# Patient Record
Sex: FEMALE | Race: WHITE | NOT HISPANIC OR LATINO | Employment: OTHER | URBAN - METROPOLITAN AREA
[De-identification: names, ages, dates, MRNs, and addresses within clinical notes are randomized per-mention and may not be internally consistent; named-entity substitution may affect disease eponyms.]

---

## 2017-01-30 ENCOUNTER — ONCOLOGY VISIT (OUTPATIENT)
Dept: ONCOLOGY | Facility: CLINIC | Age: 27
End: 2017-01-30
Attending: NURSE PRACTITIONER
Payer: COMMERCIAL

## 2017-01-30 ENCOUNTER — APPOINTMENT (OUTPATIENT)
Dept: LAB | Facility: CLINIC | Age: 27
End: 2017-01-30
Attending: NURSE PRACTITIONER
Payer: COMMERCIAL

## 2017-01-30 VITALS
BODY MASS INDEX: 21.25 KG/M2 | WEIGHT: 119.93 LBS | SYSTOLIC BLOOD PRESSURE: 118 MMHG | HEIGHT: 63 IN | RESPIRATION RATE: 18 BRPM | DIASTOLIC BLOOD PRESSURE: 74 MMHG | TEMPERATURE: 97.8 F | OXYGEN SATURATION: 100 % | HEART RATE: 83 BPM

## 2017-01-30 DIAGNOSIS — Z08 ENCOUNTER FOR FOLLOW-UP SURVEILLANCE OF OVARIAN CANCER: ICD-10-CM

## 2017-01-30 DIAGNOSIS — Z85.43 ENCOUNTER FOR FOLLOW-UP SURVEILLANCE OF OVARIAN CANCER: ICD-10-CM

## 2017-01-30 LAB — CANCER AG125 SERPL-ACNC: 13 U/ML (ref 0–30)

## 2017-01-30 PROCEDURE — 86304 IMMUNOASSAY TUMOR CA 125: CPT | Performed by: NURSE PRACTITIONER

## 2017-01-30 PROCEDURE — 36415 COLL VENOUS BLD VENIPUNCTURE: CPT

## 2017-01-30 PROCEDURE — 99212 OFFICE O/P EST SF 10 MIN: CPT

## 2017-01-30 PROCEDURE — 99213 OFFICE O/P EST LOW 20 MIN: CPT | Mod: ZP | Performed by: NURSE PRACTITIONER

## 2017-01-30 ASSESSMENT — ENCOUNTER SYMPTOMS
JOINT SWELLING: 0
SPEECH CHANGE: 0
RESPIRATORY PAIN: 0
PALPITATIONS: 0
MUSCLE CRAMPS: 0
HOARSE VOICE: 0
NAUSEA: 0
LIGHT-HEADEDNESS: 0
HEARTBURN: 0
EYE PAIN: 0
SMELL DISTURBANCE: 0
TACHYCARDIA: 0
PARALYSIS: 0
ARTHRALGIAS: 0
DECREASED APPETITE: 0
DIFFICULTY URINATING: 0
SHORTNESS OF BREATH: 0
HALLUCINATIONS: 0
CHILLS: 0
INSOMNIA: 0
BLOATING: 0
TINGLING: 0
JAUNDICE: 0
INCREASED ENERGY: 0
DIZZINESS: 0
DIARRHEA: 0
MEMORY LOSS: 0
SPUTUM PRODUCTION: 0
SWOLLEN GLANDS: 0
HEMOPTYSIS: 0
MYALGIAS: 0
HEADACHES: 0
LOSS OF CONSCIOUSNESS: 0
SNORES LOUDLY: 0
SLEEP DISTURBANCES DUE TO BREATHING: 0
COUGH DISTURBING SLEEP: 0
DISTURBANCES IN COORDINATION: 0
DYSURIA: 0
SKIN CHANGES: 0
SINUS CONGESTION: 0
HYPOTENSION: 0
COUGH: 0
CLAUDICATION: 0
DECREASED LIBIDO: 0
STIFFNESS: 0
CONSTIPATION: 0
POOR WOUND HEALING: 0
TASTE DISTURBANCE: 0
NIGHT SWEATS: 0
EYE WATERING: 0
FLANK PAIN: 0
ABDOMINAL PAIN: 0
SINUS PAIN: 0
POLYPHAGIA: 0
MUSCLE WEAKNESS: 0
LEG PAIN: 0
VOMITING: 0
TROUBLE SWALLOWING: 0
FATIGUE: 0
ORTHOPNEA: 0
FEVER: 0
EYE REDNESS: 0
EXERCISE INTOLERANCE: 0
POSTURAL DYSPNEA: 0
BACK PAIN: 0
HOT FLASHES: 0
PANIC: 0
WHEEZING: 0
NERVOUS/ANXIOUS: 0
BREAST PAIN: 0
SYNCOPE: 0
DOUBLE VISION: 0
NECK PAIN: 0
NECK MASS: 0
WEIGHT GAIN: 0
SEIZURES: 0
ALTERED TEMPERATURE REGULATION: 0
BOWEL INCONTINENCE: 0
RECTAL BLEEDING: 0
NUMBNESS: 0
DECREASED CONCENTRATION: 0
NAIL CHANGES: 0
WEAKNESS: 0
DEPRESSION: 0
DYSPNEA ON EXERTION: 0
EYE IRRITATION: 0
POLYDIPSIA: 0
WEIGHT LOSS: 0
TREMORS: 0
BRUISES/BLEEDS EASILY: 1
BLOOD IN STOOL: 0
BREAST MASS: 0
RECTAL PAIN: 0
LEG SWELLING: 0
SORE THROAT: 0
HEMATURIA: 0
HYPERTENSION: 0

## 2017-01-30 ASSESSMENT — PAIN SCALES - GENERAL: PAINLEVEL: NO PAIN (0)

## 2017-01-30 NOTE — NURSING NOTE
"Kelley Serra is a 26 year old female who presents for:  Chief Complaint   Patient presents with     Labs Only     venipuncture, vitals checked     Oncology Clinic Visit     return patient visit for 3 month ck related to ovarian cancer         Initial Vitals:  /74 mmHg  Pulse 83  Temp(Src) 97.8  F (36.6  C)  Resp 18  Ht 1.6 m (5' 3\")  Wt 54.4 kg (119 lb 14.9 oz)  BMI 21.25 kg/m2  SpO2 100% Estimated body mass index is 21.25 kg/(m^2) as calculated from the following:    Height as of this encounter: 1.6 m (5' 3\").    Weight as of this encounter: 54.4 kg (119 lb 14.9 oz).. Body surface area is 1.55 meters squared. BP completed using cuff size: NA (Not Taken)  No Pain (0) No LMP recorded. Allergies and medications reviewed.     Medications: Medication refills not needed today.  Pharmacy name entered into Drim[facility]: [facility] [place] [address] AT [facility]    Comments: patient denied pelvic/vaginal pain/discomfort.     5 minutes for nursing intake (face to face time)   Francisca Washington CMA          "

## 2017-01-30 NOTE — Clinical Note
2017       RE: Kelley Serra  [address] [facility] [address]     Dear Colleague,    Thank you for referring your patient, Kelley Serra, to the [facility]. Please see a copy of my visit note below.    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 She completed treatment on 10/2/15 with an exploratory laparotomy, RSO, and fertility sparing staging. She presents today for a 3 month surveillance visit.    HPI: Oscar comes to the clinic accompanied by her  Nik. She has been feeling well without gynecologic concern. She has been having normal periods every 28-30 days, LMP two weeks ago. Her headaches have resolved with changing to a larger computer screen and getting glasses. She notes a bruise to her left lower extremity and is unaware of how she got it along with another bruise on her left upper thigh but denies any petechiae or spontaneous mucosal bleeding. Denies unintended weight loss, fatigue, weakness, changes in vision or hearing, shortness of breath, cough, chest pain, abdominal pain, dyspepsia, nausea, vomiting, constipation, diarrhea, bloating, dysuria, urinary frequency or urgency, hematuria, pelvic pain, lower back pain, vaginal bleeding, vaginal discharge, numbness or tingling, or swelling of the extremities.       Brief Oncology History:  16: Abdominal pain, 30cm pelvic mass.  73.  10/2/15: Sent to OR for ex lap, RSO, and fertility sparing staging. 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 Elected observation at this time.  16:  13  16:  9, Pap NIL  10/31/16:  10  17:  13    Past Medical History   Diagnosis Date     Esophageal reflux      Asthma, mild intermittent      last  inhaler usage >6 years ago (4/11)     Diagnostic skin and sensitization tests 2/26/14 skin tests pos. for:  dog(+)/DM/M only and only on intradermals--ie, minimally allergic     Allergic rhinitis due to animal dander      House dust mite allergy      Allergy to mold spores      2/26/14 skin tests pos. 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     treated with RSO       Past Surgical History   Procedure Laterality Date     Laparotomy exploratory  10/2/15     Right salpingo-oophorectomy, omentectomy, appendectomy, lymph node biopsies, cancer staging      Laparotomy, staging, combined N/A 10/2/2015     Procedure: COMBINED LAPAROTOMY, STAGING;  Surgeon: Duyen Thayer MD;  Location:  OR     Esophagoscopy, gastroscopy, duodenoscopy (egd), combined N/A 11/23/2015     Procedure: COMBINED ESOPHAGOSCOPY, GASTROSCOPY, DUODENOSCOPY (EGD), BIOPSY SINGLE OR MULTIPLE;  Surgeon: Babs Cassidy MD;  Location:  GI     Colonoscopy N/A 12/2/2015     Procedure: COLONOSCOPY;  Surgeon: Aaron Garrison MD;  Location:  GI       Social History     Social History     Marital Status:      Spouse Name: N/A     Number of Children: N/A     Years of Education: N/A     Occupational History     Not on file.     Social History Main Topics     Smoking status: Never Smoker      Smokeless tobacco: Never Used     Alcohol Use: No     Drug Use: No     Sexual Activity:     Partners: Male     Birth Control/ Protection: Condom     Other Topics Concern     Parent/Sibling W/ Cabg, Mi Or Angioplasty Before 65f 55m? No     Social History Narrative       Family History   Problem Relation Age of Onset     DIABETES Mother      gestational     Thyroid Disease Mother      Blood Disease Mother      anemia     Cancer - colorectal Mother 54     CANCER Maternal Grandmother      Blood Disease Maternal Grandmother      cervical      Respiratory Maternal Grandmother      emphysema     DIABETES Maternal Grandfather      CEREBROVASCULAR DISEASE Maternal Grandfather      CANCER Maternal Grandfather      kidney     Breast Cancer Paternal Grandmother      DIABETES Paternal Grandfather      HEART DISEASE Father 56     had angioplasty       Current Outpatient Prescriptions   Medication     FLUoxetine (PROZAC) 10 MG capsule     ALPRAZolam (XANAX) 0.25 MG tablet     MULTIPLE VITAMINS PO     No current facility-administered medications for this visit.     Facility-Administered Medications Ordered in Other Visits   Medication     ketorolac (TORADOL) injection          Allergies   Allergen Reactions     Cleocin Rash     Keflex [Cephalexin Monohydrate] Rash     Zoloft        Review of Systems     Constitutional:  Negative for fever, chills, weight loss, weight gain, fatigue, decreased appetite, night sweats, recent stressors, height gain, height loss, post-operative complications, incisional pain, hallucinations, increased energy, hyperactivity and confused.   HENT:  Negative for ear pain, hearing loss, tinnitus, nosebleeds, trouble swallowing, hoarse voice, mouth sores, sore throat, ear discharge, tooth pain, gum tenderness, taste disturbance, smell disturbance, hearing aid, bleeding gums, dry mouth, sinus pain, sinus congestion and neck mass.    Eyes:  Negative for double vision, pain, redness, eye pain, decreased vision, eye watering, eye bulging, eye dryness, flashing lights, spots, floaters, strabismus, tunnel vision, jaundice and eye irritation.   Respiratory:   Negative for cough, hemoptysis, sputum production, shortness of breath, wheezing, sleep disturbances due to breathing, snores loudly, respiratory pain, dyspnea on exertion, cough disturbing sleep and postural dyspnea.    Cardiovascular:  Negative for chest pain, dyspnea on exertion, palpitations, orthopnea, claudication, leg swelling, fingers/toes turn blue, hypertension, hypotension, syncope,  history of heart murmur, chest pain on exertion, chest pain at rest, pacemaker, few scattered varicosities, leg pain, sleep disturbances due to breathing, tachycardia, light-headedness, exercise intolerance and edema.   Gastrointestinal:  Negative for heartburn, nausea, vomiting, abdominal pain, diarrhea, constipation, blood in stool, melena, rectal pain, bloating, hemorrhoids, bowel incontinence, jaundice, rectal bleeding, coffee ground emesis and change in stool.   Genitourinary:  Negative for bladder incontinence, dysuria, urgency, hematuria, flank pain, vaginal discharge, difficulty urinating, genital sores, dyspareunia, decreased libido, nocturia, voiding less frequently, arousal difficulty, abnormal vaginal bleeding, excessive menstruation, menstrual changes, hot flashes, vaginal dryness and postmenopausal bleeding.   Musculoskeletal:  Negative for myalgias, back pain, joint swelling, arthralgias, stiffness, muscle cramps, neck pain, bone pain, muscle weakness and fracture.   Skin:  Negative for nail changes, itching, poor wound healing, rash, hair changes, skin changes, acne, warts, poor wound healing, scarring, flaky skin, Raynaud's phenomenon, sensitivity to sunlight and skin thickening.   Neurological:  Negative for dizziness, tingling, tremors, speech change, seizures, loss of consciousness, weakness, light-headedness, numbness, headaches, disturbances in coordination, memory loss, difficulty walking and paralysis.   Endo/Heme:  Positive for bruises/bleeds easily. Negative for anemia and swollen glands.   Psychiatric/Behavioral:  Negative for depression, hallucinations, memory loss, decreased concentration, mood swings and panic attacks.    Breast:  Negative for breast discharge, breast mass, breast pain and nipple retraction.   Endocrine:  Negative for altered temperature regulation, polyphagia, polydipsia, unwanted hair growth and change in facial hair.              OBJECTIVE:    Physical Exam:    BP  "118/74 mmHg  Pulse 83  Temp(Src) 97.8  F (36.6  C)  Resp 18  Ht 1.6 m (5' 3\")  Wt 54.4 kg (119 lb 14.9 oz)  BMI 21.25 kg/m2  SpO2 100%    CONSTITUTIONAL: Alert non-toxic appearing female in no acute distress  HEAD: Normocephalic, atraumatic  EYES: PERRLA; no scleral icterus  ENT: Oropharynx pink without lesions  NECK: Neck supple without lymphadenopathy  RESPIRATORY: Lungs clear to auscultation, no increased work of breathing noted  CV: Regular rate and rhythm, S1S2, no clicks, murmurs, rubs, or gallops; bilateral lower extremities without edema, dorsalis pedis pulses 2+ bilaterally  GASTROINTESTINAL: Normoactive bowel sounds x4 quadrants, abdomen soft, non-distended, and non-tender to palpation without masses or organomegaly  GENITOURINARY: External genitalia and urethral meatus pink without lesions, masses, or excoriation. Vagina pink and moist without masses or lesions. Cervix without masses or lesions, os without bleeding, small amount of physiologic discharge noted. Bimanual exam reveals no masses, fullness, or cervical motion tenderness. Rectovaginal exam confirms these findings.  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 Continue surveillance every three months x2 years (through 10/2017) followed by every six months x3 years (through 10/2020) then annually thereafter with  and pelvic/rectal exam. Reviewed signs and symptoms  of recurrence " including but not limited to bleeding from vagina, bladder, or rectum, bloating, early satiety, swelling in the lower extremities, abdominal or lower back pain, changes in bowel or bladder patterns, shortness of breath, increased fatigue, unexplained weight loss, and night sweats. Reviewed signs and symptoms for when she should contact the clinic or seek additional care. Patient to contact the clinic with any questions or concerns in the interim.  2) Bruising: No spontaneous bleeding, mucosal bleeds, or petechiae. To contact PCP if this worsens, discussed s/sxs bleeding.  3) Genetic testing: Risk factors have been assessed and the patient does not meet qualifications for genetic counseling based on NCCN guidelines.   4) Labs:   5) Health maintenance issues discussed today include to follow up with PCP for co-morbid conditions and non-gynecologic issues. Pap due in 2019.  6) Patient verbalized understanding of and agreement with plan.    A total of 20 minutes of face to face time spent with patient, over 50% of which was spent in counseling and coordination of care.    CLAUDIA Chandler, FNP-C  Family Nurse Practitioner  Gynecologic Oncology  [facility]  Pager: [phone]

## 2017-01-30 NOTE — PROGRESS NOTES
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 She completed treatment on 10/2/15 with an exploratory laparotomy, RSO, and fertility sparing staging. She presents today for a 3 month surveillance visit.    HPI: Oscar comes to the clinic accompanied by her  Nik. She has been feeling well without gynecologic concern. She has been having normal periods every 28-30 days, LMP two weeks ago. Her headaches have resolved with changing to a larger computer screen and getting glasses. She notes a bruise to her left lower extremity and is unaware of how she got it along with another bruise on her left upper thigh but denies any petechiae or spontaneous mucosal bleeding. Denies unintended weight loss, fatigue, weakness, changes in vision or hearing, shortness of breath, cough, chest pain, abdominal pain, dyspepsia, nausea, vomiting, constipation, diarrhea, bloating, dysuria, urinary frequency or urgency, hematuria, pelvic pain, lower back pain, vaginal bleeding, vaginal discharge, numbness or tingling, or swelling of the extremities.       Brief Oncology History:  16: Abdominal pain, 30cm pelvic mass.  73.  10/2/15: Sent to OR for ex lap, RSO, and fertility sparing staging. 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 Elected observation at this time.  16:  13  16:  9, Pap NIL  10/31/16:  10  17:  13    Past Medical History   Diagnosis Date     Esophageal reflux      Asthma, mild intermittent      last inhaler usage >6 years ago ()     Diagnostic skin and sensitization tests 14 skin tests pos. for:  dog(+)/DM/M only and only on intradermals--ie, minimally allergic     Allergic rhinitis due to animal dander      House dust mite allergy       Allergy to mold spores      2/26/14 skin tests pos. 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     treated with RSO       Past Surgical History   Procedure Laterality Date     Laparotomy exploratory  10/2/15     Right salpingo-oophorectomy, omentectomy, appendectomy, lymph node biopsies, cancer staging      Laparotomy, staging, combined N/A 10/2/2015     Procedure: COMBINED LAPAROTOMY, STAGING;  Surgeon: Duyen Thayer MD;  Location: [facility] OR     Esophagoscopy, gastroscopy, duodenoscopy (egd), combined N/A 11/23/2015     Procedure: COMBINED ESOPHAGOSCOPY, GASTROSCOPY, DUODENOSCOPY (EGD), BIOPSY SINGLE OR MULTIPLE;  Surgeon: Babs Cassidy MD;  Location: [facility] GI     Colonoscopy N/A 12/2/2015     Procedure: COLONOSCOPY;  Surgeon: Aaron Garrison MD;  Location:  GI       Social History     Social History     Marital Status:      Spouse Name: N/A     Number of Children: N/A     Years of Education: N/A     Occupational History     Not on file.     Social History Main Topics     Smoking status: Never Smoker      Smokeless tobacco: Never Used     Alcohol Use: No     Drug Use: No     Sexual Activity:     Partners: Male     Birth Control/ Protection: Condom     Other Topics Concern     Parent/Sibling W/ Cabg, Mi Or Angioplasty Before 65f 55m? No     Social History Narrative       Family History   Problem Relation Age of Onset     DIABETES Mother      gestational     Thyroid Disease Mother      Blood Disease Mother      anemia     Cancer - colorectal Mother 54     CANCER Maternal Grandmother      Blood Disease Maternal Grandmother      cervical     Respiratory Maternal Grandmother      emphysema     DIABETES Maternal Grandfather      CEREBROVASCULAR DISEASE Maternal Grandfather      CANCER Maternal Grandfather      kidney     Breast Cancer Paternal Grandmother      DIABETES Paternal Grandfather       HEART DISEASE Father 56     had angioplasty       Current Outpatient Prescriptions   Medication     FLUoxetine (PROZAC) 10 MG capsule     ALPRAZolam (XANAX) 0.25 MG tablet     MULTIPLE VITAMINS PO     No current facility-administered medications for this visit.     Facility-Administered Medications Ordered in Other Visits   Medication     ketorolac (TORADOL) injection          Allergies   Allergen Reactions     Cleocin Rash     Keflex [Cephalexin Monohydrate] Rash     Zoloft        Review of Systems     Constitutional:  Negative for fever, chills, weight loss, weight gain, fatigue, decreased appetite, night sweats, recent stressors, height gain, height loss, post-operative complications, incisional pain, hallucinations, increased energy, hyperactivity and confused.   HENT:  Negative for ear pain, hearing loss, tinnitus, nosebleeds, trouble swallowing, hoarse voice, mouth sores, sore throat, ear discharge, tooth pain, gum tenderness, taste disturbance, smell disturbance, hearing aid, bleeding gums, dry mouth, sinus pain, sinus congestion and neck mass.    Eyes:  Negative for double vision, pain, redness, eye pain, decreased vision, eye watering, eye bulging, eye dryness, flashing lights, spots, floaters, strabismus, tunnel vision, jaundice and eye irritation.   Respiratory:   Negative for cough, hemoptysis, sputum production, shortness of breath, wheezing, sleep disturbances due to breathing, snores loudly, respiratory pain, dyspnea on exertion, cough disturbing sleep and postural dyspnea.    Cardiovascular:  Negative for chest pain, dyspnea on exertion, palpitations, orthopnea, claudication, leg swelling, fingers/toes turn blue, hypertension, hypotension, syncope, history of heart murmur, chest pain on exertion, chest pain at rest, pacemaker, few scattered varicosities, leg pain, sleep disturbances due to breathing, tachycardia, light-headedness, exercise intolerance and edema.   Gastrointestinal:  Negative for  "heartburn, nausea, vomiting, abdominal pain, diarrhea, constipation, blood in stool, melena, rectal pain, bloating, hemorrhoids, bowel incontinence, jaundice, rectal bleeding, coffee ground emesis and change in stool.   Genitourinary:  Negative for bladder incontinence, dysuria, urgency, hematuria, flank pain, vaginal discharge, difficulty urinating, genital sores, dyspareunia, decreased libido, nocturia, voiding less frequently, arousal difficulty, abnormal vaginal bleeding, excessive menstruation, menstrual changes, hot flashes, vaginal dryness and postmenopausal bleeding.   Musculoskeletal:  Negative for myalgias, back pain, joint swelling, arthralgias, stiffness, muscle cramps, neck pain, bone pain, muscle weakness and fracture.   Skin:  Negative for nail changes, itching, poor wound healing, rash, hair changes, skin changes, acne, warts, poor wound healing, scarring, flaky skin, Raynaud's phenomenon, sensitivity to sunlight and skin thickening.   Neurological:  Negative for dizziness, tingling, tremors, speech change, seizures, loss of consciousness, weakness, light-headedness, numbness, headaches, disturbances in coordination, memory loss, difficulty walking and paralysis.   Endo/Heme:  Positive for bruises/bleeds easily. Negative for anemia and swollen glands.   Psychiatric/Behavioral:  Negative for depression, hallucinations, memory loss, decreased concentration, mood swings and panic attacks.    Breast:  Negative for breast discharge, breast mass, breast pain and nipple retraction.   Endocrine:  Negative for altered temperature regulation, polyphagia, polydipsia, unwanted hair growth and change in facial hair.              OBJECTIVE:    Physical Exam:    /74 mmHg  Pulse 83  Temp(Src) 97.8  F (36.6  C)  Resp 18  Ht 1.6 m (5' 3\")  Wt 54.4 kg (119 lb 14.9 oz)  BMI 21.25 kg/m2  SpO2 100%    CONSTITUTIONAL: Alert non-toxic appearing female in no acute distress  HEAD: Normocephalic, atraumatic  EYES: " PERRLA; no scleral icterus  ENT: Oropharynx pink without lesions  NECK: Neck supple without lymphadenopathy  RESPIRATORY: Lungs clear to auscultation, no increased work of breathing noted  CV: Regular rate and rhythm, S1S2, no clicks, murmurs, rubs, or gallops; bilateral lower extremities without edema, dorsalis pedis pulses 2+ bilaterally  GASTROINTESTINAL: Normoactive bowel sounds x4 quadrants, abdomen soft, non-distended, and non-tender to palpation without masses or organomegaly  GENITOURINARY: External genitalia and urethral meatus pink without lesions, masses, or excoriation. Vagina pink and moist without masses or lesions. Cervix without masses or lesions, os without bleeding, small amount of physiologic discharge noted. Bimanual exam reveals no masses, fullness, or cervical motion tenderness. Rectovaginal exam confirms these findings.  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 Continue surveillance every three months x2 years (through 10/2017) followed by every six months x3 years (through 10/2020) then annually thereafter with  and pelvic/rectal exam. Reviewed signs and symptoms  of recurrence including but not limited to bleeding from vagina, bladder, or rectum, bloating, early satiety, swelling in the lower extremities, abdominal or lower back pain, changes in bowel or bladder patterns, shortness of breath, increased fatigue, unexplained  weight loss, and night sweats. Reviewed signs and symptoms for when she should contact the clinic or seek additional care. Patient to contact the clinic with any questions or concerns in the interim.  2) Bruising: No spontaneous bleeding, mucosal bleeds, or petechiae. To contact PCP if this worsens, discussed s/sxs bleeding.  3) Genetic testing: Risk factors have been assessed and the patient does not meet qualifications for genetic counseling based on NCCN guidelines.   4) Labs:   5) Health maintenance issues discussed today include to follow up with PCP for co-morbid conditions and non-gynecologic issues. Pap due in 2019.  6) Patient verbalized understanding of and agreement with plan.    A total of 20 minutes of face to face time spent with patient, over 50% of which was spent in counseling and coordination of care.    CLAUDIA Chandler, FNP-C  Family Nurse Practitioner  Gynecologic Oncology  [facility]  Pager: [phone]

## 2017-04-19 ASSESSMENT — ENCOUNTER SYMPTOMS
TASTE DISTURBANCE: 0
FEVER: 0
HOARSE VOICE: 0
HALLUCINATIONS: 0
SORE THROAT: 1
POLYPHAGIA: 0
SMELL DISTURBANCE: 0
POLYDIPSIA: 0
SINUS CONGESTION: 1
WEIGHT LOSS: 0
DECREASED APPETITE: 0
INCREASED ENERGY: 0
NECK MASS: 0
ALTERED TEMPERATURE REGULATION: 0
CHILLS: 0
SINUS PAIN: 0
WEIGHT GAIN: 0
FATIGUE: 1
TROUBLE SWALLOWING: 0
NIGHT SWEATS: 1

## 2017-04-23 ASSESSMENT — ENCOUNTER SYMPTOMS
DISTURBANCES IN COORDINATION: 0
RECTAL PAIN: 0
NERVOUS/ANXIOUS: 0
HYPERTENSION: 0
BOWEL INCONTINENCE: 0
DECREASED CONCENTRATION: 0
BLOATING: 0
POLYDIPSIA: 0
LOSS OF CONSCIOUSNESS: 0
JOINT SWELLING: 0
SPUTUM PRODUCTION: 0
POOR WOUND HEALING: 0
BREAST MASS: 0
ARTHRALGIAS: 0
SPEECH CHANGE: 0
WEAKNESS: 0
BACK PAIN: 0
HOARSE VOICE: 0
INCREASED ENERGY: 0
TASTE DISTURBANCE: 0
TROUBLE SWALLOWING: 0
DYSURIA: 0
DIFFICULTY URINATING: 0
NECK PAIN: 0
ABDOMINAL PAIN: 0
INSOMNIA: 0
BREAST PAIN: 0
RESPIRATORY PAIN: 0
MYALGIAS: 0
POSTURAL DYSPNEA: 0
LEG PAIN: 0
TACHYCARDIA: 0
FLANK PAIN: 0
HALLUCINATIONS: 0
PALPITATIONS: 0
BRUISES/BLEEDS EASILY: 1
DECREASED APPETITE: 0
PANIC: 0
MUSCLE CRAMPS: 0
MEMORY LOSS: 0
EYE IRRITATION: 0
DECREASED LIBIDO: 0
MUSCLE WEAKNESS: 0
SNORES LOUDLY: 0
TINGLING: 0
LEG SWELLING: 0
SEIZURES: 0
DEPRESSION: 0
RECTAL BLEEDING: 0
DIZZINESS: 0
POLYPHAGIA: 0
SYNCOPE: 0
CHILLS: 0
ALTERED TEMPERATURE REGULATION: 0
VOMITING: 0
DIARRHEA: 0
WEIGHT GAIN: 0
WHEEZING: 0
CONSTIPATION: 0
DYSPNEA ON EXERTION: 0
LIGHT-HEADEDNESS: 0
NUMBNESS: 0
TREMORS: 0
BLOOD IN STOOL: 0
CLAUDICATION: 0
FEVER: 0
SLEEP DISTURBANCES DUE TO BREATHING: 0
HEMOPTYSIS: 0
HEARTBURN: 0
SMELL DISTURBANCE: 0
EXERCISE INTOLERANCE: 0
EYE WATERING: 0
SKIN CHANGES: 0
SHORTNESS OF BREATH: 0
NAIL CHANGES: 0
HEMATURIA: 0
COUGH DISTURBING SLEEP: 0
STIFFNESS: 0
WEIGHT LOSS: 0
DOUBLE VISION: 0
EYE REDNESS: 0
COUGH: 0
SWOLLEN GLANDS: 0
HEADACHES: 0
JAUNDICE: 0
NECK MASS: 0
HOT FLASHES: 0
PARALYSIS: 0
HYPOTENSION: 0
SINUS PAIN: 0
NAUSEA: 0
EYE PAIN: 0
ORTHOPNEA: 0

## 2017-04-23 NOTE — PROGRESS NOTES
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 She completed treatment on 10/2/15 with an exploratory laparotomy, RSO, and fertility sparing staging. She presents today for a 3 month surveillance visit.    HPI: Oscar comes to the clinic  feeling well without gynecologic concern. She has been having normal periods every 28-30 days. She is sexually active without concern. She notes \"random night sweats\" and a \"canker sore\" since February. She saw her dentist who recommend salt water rinses but feels this has not improved and is now causing headaches. She continues to note bruising but denies any beau mucosal bleeding. She is due to see her PCP. Denies unintended weight loss, fatigue, weakness, changes in vision or hearing, shortness of breath, cough, chest pain, abdominal pain, dyspepsia, nausea, vomiting, constipation, diarrhea, bloating, dysuria, urinary frequency or urgency, hematuria, pelvic pain, lower back pain, abnormal vaginal bleeding, vaginal discharge, numbness or tingling, or swelling of the extremities.       Brief Oncology History:  16: Abdominal pain, 30cm pelvic mass.  73.  10/2/15: Sent to OR for ex lap, RSO, and fertility sparing staging. 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 Elected observation at this time.  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 for:  dog(+)/DM/M only and only on intradermals--ie, minimally allergic     Asthma, mild intermittent     last inhaler usage >6 years ago ()     Diagnostic skin and sensitization tests 14 skin tests " pos. 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    treated with RSO       Past Surgical History:   Procedure Laterality Date     COLONOSCOPY N/A 12/2/2015    Procedure: COLONOSCOPY;  Surgeon: Aaron Garrison MD;  Location:  GI     ESOPHAGOSCOPY, GASTROSCOPY, DUODENOSCOPY (EGD), COMBINED N/A 11/23/2015    Procedure: COMBINED ESOPHAGOSCOPY, GASTROSCOPY, DUODENOSCOPY (EGD), BIOPSY SINGLE OR MULTIPLE;  Surgeon: Babs Cassidy MD;  Location:  GI     LAPAROTOMY EXPLORATORY  10/2/15    Right salpingo-oophorectomy, omentectomy, appendectomy, lymph node biopsies, cancer staging      LAPAROTOMY, STAGING, COMBINED N/A 10/2/2015    Procedure: COMBINED LAPAROTOMY, STAGING;  Surgeon: Duyen Thayer MD;  Location:  OR       Social History     Social History     Marital status:      Spouse name: N/A     Number of children: N/A     Years of education: N/A     Occupational History     Not on file.     Social History Main Topics     Smoking status: Never Smoker     Smokeless tobacco: Never Used     Alcohol use No     Drug use: No     Sexual activity: Yes     Partners: Male     Birth control/ protection: Condom     Other Topics Concern     Parent/Sibling W/ Cabg, Mi Or Angioplasty Before 65f 55m? No     Social History Narrative       Family History   Problem Relation Age of Onset     DIABETES Mother      gestational     Thyroid Disease Mother      Blood Disease Mother      anemia     Cancer - colorectal Mother 54     CANCER Maternal Grandmother      Blood Disease Maternal Grandmother      cervical     Respiratory Maternal Grandmother      emphysema     DIABETES Maternal Grandfather      CEREBROVASCULAR DISEASE Maternal Grandfather      CANCER Maternal Grandfather      kidney     Breast Cancer Paternal Grandmother      DIABETES Paternal Grandfather       HEART DISEASE Father 56     had angioplasty       Current Outpatient Prescriptions   Medication     FLUoxetine (PROZAC) 10 MG capsule     ALPRAZolam (XANAX) 0.25 MG tablet     MULTIPLE VITAMINS PO     No current facility-administered medications for this visit.      Facility-Administered Medications Ordered in Other Visits   Medication     ketorolac (TORADOL) injection          Allergies   Allergen Reactions     Cleocin Rash     Keflex [Cephalexin Monohydrate] Rash     Zoloft        Review of Systems     Constitutional:  Positive for fatigue and night sweats. Negative for fever, chills, weight loss, weight gain, decreased appetite, recent stressors, height gain, height loss, post-operative complications, incisional pain, hallucinations, increased energy, hyperactivity and confused.   HENT:  Positive for mouth sores, sore throat, gum tenderness and sinus congestion. Negative for ear pain, hearing loss, tinnitus, nosebleeds, trouble swallowing, hoarse voice, ear discharge, tooth pain, taste disturbance, smell disturbance, hearing aid, bleeding gums, dry mouth, sinus pain and neck mass.    Eyes:  Negative for double vision, pain, redness, eye pain, decreased vision, eye watering, eye bulging, eye dryness, flashing lights, spots, floaters, strabismus, tunnel vision, jaundice and eye irritation.   Respiratory:   Negative for cough, hemoptysis, sputum production, shortness of breath, wheezing, sleep disturbances due to breathing, snores loudly, respiratory pain, dyspnea on exertion, cough disturbing sleep and postural dyspnea.    Cardiovascular:  Negative for chest pain, dyspnea on exertion, palpitations, orthopnea, claudication, leg swelling, fingers/toes turn blue, hypertension, hypotension, syncope, history of heart murmur, chest pain on exertion, chest pain at rest, pacemaker, few scattered varicosities, leg pain, sleep disturbances due to breathing, tachycardia, light-headedness, exercise intolerance and edema.  "  Gastrointestinal:  Negative for heartburn, nausea, vomiting, abdominal pain, diarrhea, constipation, blood in stool, melena, rectal pain, bloating, hemorrhoids, bowel incontinence, jaundice, rectal bleeding, coffee ground emesis and change in stool.   Genitourinary:  Negative for bladder incontinence, dysuria, urgency, hematuria, flank pain, vaginal discharge, difficulty urinating, genital sores, dyspareunia, decreased libido, nocturia, voiding less frequently, arousal difficulty, abnormal vaginal bleeding, excessive menstruation, menstrual changes, hot flashes, vaginal dryness and postmenopausal bleeding.   Musculoskeletal:  Negative for myalgias, back pain, joint swelling, arthralgias, stiffness, muscle cramps, neck pain, bone pain, muscle weakness and fracture.   Skin:  Negative for nail changes, itching, poor wound healing, rash, hair changes, skin changes, acne, warts, poor wound healing, scarring, flaky skin, Raynaud's phenomenon, sensitivity to sunlight and skin thickening.   Neurological:  Negative for dizziness, tingling, tremors, speech change, seizures, loss of consciousness, weakness, light-headedness, numbness, headaches, disturbances in coordination, memory loss, difficulty walking and paralysis.   Endo/Heme:  Positive for bruises/bleeds easily. Negative for anemia and swollen glands.   Psychiatric/Behavioral:  Negative for depression, hallucinations, memory loss, decreased concentration, mood swings and panic attacks.    Breast:  Negative for breast discharge, breast mass, breast pain and nipple retraction.   Endocrine:  Negative for altered temperature regulation, polyphagia, polydipsia, unwanted hair growth and change in facial hair.        OBJECTIVE:    Physical Exam:    /62  Pulse 81  Temp 98.3  F (36.8  C)  Resp 17  Ht 1.6 m (5' 3\")  Wt 54 kg (119 lb)  SpO2 98%  BMI 21.08 kg/m2    CONSTITUTIONAL: Alert non-toxic appearing female in no acute distress  HEAD: Normocephalic, " atraumatic  EYES: PERRLA; no scleral icterus  ENT: Oropharynx pink without lesions other than small raised lesion to R upper gumline  NECK: Neck supple without lymphadenopathy  RESPIRATORY: Lungs clear to auscultation, no increased work of breathing noted  CV: Regular rate and rhythm, S1S2, no clicks, murmurs, rubs, or gallops; bilateral lower extremities without edema, dorsalis pedis pulses 2+ bilaterally  GASTROINTESTINAL: Normoactive bowel sounds x4 quadrants, abdomen soft, non-distended, and non-tender to palpation without masses or organomegaly  GENITOURINARY: External genitalia and urethral meatus pink without lesions, masses, or excoriation. Vagina pink and moist without masses or lesions. Cervix without masses or lesions, os without bleeding, small amount of physiologic discharge noted. Bimanual exam reveals no masses, fullness, or cervical motion tenderness. Rectovaginal exam confirms these findings.  LYMPHATIC: Cervical, supraclavicular, and inguinal nodes without lymphadenopathy  MUSCULOSKELETAL: Moves all extremities, no obvious muscle wasting  NEUROLOGIC: No gross deficits, normal gait  SKIN: Appropriate color for race, warm and dry, no rashes or lesions to unclothed skin  PSYCHIATRIC: Pleasant and interactive, affect bright, makes appropriate eye contact, thought process linear    Data:   10    Assessment/Plan:  1) History of borderline mucinous tumor of the right ovary: No signs of recurrence. Continue surveillance every three months x2 years (through 10/2017) followed by every six months x3 years (through 10/2020) then annually thereafter with  and pelvic/rectal exam. Reviewed signs and symptoms  of recurrence including but not limited to bleeding from vagina, bladder, or rectum, bloating, early satiety, swelling in the lower extremities, abdominal or lower back pain, changes in bowel or bladder patterns, shortness of breath, increased fatigue, unexplained weight loss, and night sweats.  Reviewed signs and symptoms for when she should contact the clinic or seek additional care. Patient to contact the clinic with any questions or concerns in the interim.  2) Bruising: No spontaneous bleeding, mucosal bleeds, or petechiae. To contact PCP if this worsens, discussed s/sxs bleeding.  3) Oral lesion concerns: To discuss this with PCP as it has not improved in 2+ months and is now associated with headaches  4) Genetic testing: Risk factors have been assessed and the patient does not meet qualifications for genetic counseling based on NCCN guidelines.   5) Labs:   6) Health maintenance issues discussed today include to follow up with PCP for co-morbid conditions and non-gynecologic issues. Pap due in 2019.  7) Patient verbalized understanding of and agreement with plan.    A total of 20 minutes of face to face time spent with patient, over 50% of which was spent in counseling and coordination of care.    CLAUDIA Chandler, FNP-C  Family Nurse Practitioner  Gynecologic Oncology  [facility]  Pager: [phone]

## 2017-04-24 ENCOUNTER — ONCOLOGY VISIT (OUTPATIENT)
Dept: ONCOLOGY | Facility: CLINIC | Age: 27
End: 2017-04-24
Attending: NURSE PRACTITIONER
Payer: COMMERCIAL

## 2017-04-24 ENCOUNTER — APPOINTMENT (OUTPATIENT)
Dept: LAB | Facility: CLINIC | Age: 27
End: 2017-04-24
Attending: OBSTETRICS & GYNECOLOGY
Payer: COMMERCIAL

## 2017-04-24 VITALS
WEIGHT: 119 LBS | HEART RATE: 81 BPM | RESPIRATION RATE: 17 BRPM | SYSTOLIC BLOOD PRESSURE: 103 MMHG | OXYGEN SATURATION: 98 % | BODY MASS INDEX: 21.09 KG/M2 | HEIGHT: 63 IN | TEMPERATURE: 98.3 F | DIASTOLIC BLOOD PRESSURE: 62 MMHG

## 2017-04-24 DIAGNOSIS — Z08 ENCOUNTER FOR FOLLOW-UP SURVEILLANCE OF OVARIAN CANCER: ICD-10-CM

## 2017-04-24 DIAGNOSIS — Z85.43 ENCOUNTER FOR FOLLOW-UP SURVEILLANCE OF OVARIAN CANCER: ICD-10-CM

## 2017-04-24 LAB — CANCER AG125 SERPL-ACNC: 10 U/ML (ref 0–30)

## 2017-04-24 PROCEDURE — 86304 IMMUNOASSAY TUMOR CA 125: CPT | Performed by: NURSE PRACTITIONER

## 2017-04-24 PROCEDURE — 36415 COLL VENOUS BLD VENIPUNCTURE: CPT

## 2017-04-24 PROCEDURE — 99212 OFFICE O/P EST SF 10 MIN: CPT | Mod: ZF

## 2017-04-24 PROCEDURE — 99213 OFFICE O/P EST LOW 20 MIN: CPT | Mod: ZP | Performed by: NURSE PRACTITIONER

## 2017-04-24 ASSESSMENT — ENCOUNTER SYMPTOMS
SINUS CONGESTION: 1
NIGHT SWEATS: 1
SORE THROAT: 1
FATIGUE: 1

## 2017-04-24 ASSESSMENT — PAIN SCALES - GENERAL: PAINLEVEL: NO PAIN (0)

## 2017-04-24 NOTE — MR AVS SNAPSHOT
After Visit Summary   4/24/2017    Kelley Serra    MRN: [id number]           Patient Information     Date Of Birth          1990        Visit Information        Provider Department      4/24/2017 10:20 AM Myra Gifford APRN CNP [facility]        Today's Diagnoses     Encounter for follow-up surveillance of ovarian cancer           Follow-ups after your visit        Follow-up notes from your care team     Return in about 3 months (around 7/24/2017), or if symptoms worsen or fail to improve, for surveillance with NP.      Your next 10 appointments already scheduled     Aug 01, 2017 11:30 AM CDT   [facility] with  [facility]   [facility] Draw ([facility])    [address] [place] [id number] [phone]            Aug 01, 2017 12:00 PM CDT   (Arrive by 11:45 AM)   Return Visit with CLAUDIA Kaufman CNP   [facility] ([facility])    [address] [place] [id number] [phone]              Who to contact     If you have questions or need follow up information about today's clinic visit or your schedule please contact [facility] directly at [phone].  Normal or non-critical lab and imaging results will be communicated to you by MyChart, letter or phone within 4 business days after the clinic has received the results. If you do not hear from us within 7 days, please contact the clinic through MyChart or phone. If you have a critical or abnormal lab result, we will notify you by phone as soon as possible.  Submit refill requests through [employer] or call your pharmacy and they will forward the refill request to us. Please allow 3 business days for your refill to be completed.          Additional Information About Your Visit        [employer] Information     [employer] gives you secure access to your  "electronic health record. If you see a primary care provider, you can also send messages to your care team and make appointments. If you have questions, please call your primary care clinic.  If you do not have a primary care provider, please call [phone] and they will assist you.        Care EveryWhere ID     This is your Care EveryWhere ID. This could be used by other organizations to access your [facility] medical records  [id number]        Your Vitals Were     Pulse Temperature Respirations Height Pulse Oximetry BMI (Body Mass Index)    81 98.3  F (36.8  C) 17 1.6 m (5' 3\") 98% 21.08 kg/m2       Blood Pressure from Last 3 Encounters:   04/24/17 103/62   01/30/17 118/74   10/31/16 106/64    Weight from Last 3 Encounters:   04/24/17 54 kg (119 lb)   01/30/17 54.4 kg (119 lb 14.9 oz)   10/31/16 57.2 kg (126 lb)              We Performed the Following             Primary Care Provider Office Phone # Fax #    CLAUDIA [facility] [phone]       [place] [facility] [address] [place] [facility] [address]        Thank you!     Thank you for choosing [facility]  for your care. Our goal is always to provide you with excellent care. Hearing back from our patients is one way we can continue to improve our services. Please take a few minutes to complete the written survey that you may receive in the mail after your visit with us. Thank you!             Your Updated Medication List - Protect others around you: Learn how to safely use, store and throw away your medicines at www.disposemymeds.org.          This list is accurate as of: 4/24/17 11:25 AM.  Always use your most recent med list.                   Brand Name Dispense Instructions for use    ALPRAZolam 0.25 MG tablet    XANAX    40 tablet    Take 1 tablet (0.25 mg) by mouth 2 times daily as needed for anxiety       FLUoxetine 10 MG capsule    PROzac    30 capsule    Take 1 capsule (10 mg) by mouth daily    "    MULTIPLE VITAMINS PO      take  by mouth.

## 2017-04-24 NOTE — NURSING NOTE
Chief Complaint   Patient presents with     Blood Draw     Patient is here today for labs to be drawn right AC area. Vitals charted,labs collected and checked into next appt.

## 2017-04-24 NOTE — LETTER
"2017       RE: Kelley Serra  [address] [facility] [address]     Dear Colleague,    Thank you for referring your patient, Kelley Serra, to the [facility]. Please see a copy of my visit note below.    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 She completed treatment on 10/2/15 with an exploratory laparotomy, RSO, and fertility sparing staging. She presents today for a 3 month surveillance visit.    HPI: Oscar comes to the clinic  feeling well without gynecologic concern. She has been having normal periods every 28-30 days. She is sexually active without concern. She notes \"random night sweats\" and a \"canker sore\" since February. She saw her dentist who recommend salt water rinses but feels this has not improved and is now causing headaches. She continues to note bruising but denies any beau mucosal bleeding. She is due to see her PCP. Denies unintended weight loss, fatigue, weakness, changes in vision or hearing, shortness of breath, cough, chest pain, abdominal pain, dyspepsia, nausea, vomiting, constipation, diarrhea, bloating, dysuria, urinary frequency or urgency, hematuria, pelvic pain, lower back pain, abnormal vaginal bleeding, vaginal discharge, numbness or tingling, or swelling of the extremities.       Brief Oncology History:  16: Abdominal pain, 30cm pelvic mass.  73.  10/2/15: Sent to OR for ex lap, RSO, and fertility sparing staging. 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 Elected observation at this time.  16:  13  16:  9, Pap NIL  10/31/16:  10  17:  13  17:  10    Past Medical History:   Diagnosis Date     Allergic rhinitis due to animal dander      Allergy to " mold spores     2/26/14 skin tests pos. for:  dog(+)/DM/M only and only on intradermals--ie, minimally allergic     Asthma, mild intermittent     last inhaler usage >6 years ago (4/11)     Diagnostic skin and sensitization tests 2/26/14 skin tests pos. 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    treated with RSO       Past Surgical History:   Procedure Laterality Date     COLONOSCOPY N/A 12/2/2015    Procedure: COLONOSCOPY;  Surgeon: Aaron Garrison MD;  Location:  GI     ESOPHAGOSCOPY, GASTROSCOPY, DUODENOSCOPY (EGD), COMBINED N/A 11/23/2015    Procedure: COMBINED ESOPHAGOSCOPY, GASTROSCOPY, DUODENOSCOPY (EGD), BIOPSY SINGLE OR MULTIPLE;  Surgeon: Babs Cassidy MD;  Location:  GI     LAPAROTOMY EXPLORATORY  10/2/15    Right salpingo-oophorectomy, omentectomy, appendectomy, lymph node biopsies, cancer staging      LAPAROTOMY, STAGING, COMBINED N/A 10/2/2015    Procedure: COMBINED LAPAROTOMY, STAGING;  Surgeon: Duyen Thayer MD;  Location:  OR       Social History     Social History     Marital status:      Spouse name: N/A     Number of children: N/A     Years of education: N/A     Occupational History     Not on file.     Social History Main Topics     Smoking status: Never Smoker     Smokeless tobacco: Never Used     Alcohol use No     Drug use: No     Sexual activity: Yes     Partners: Male     Birth control/ protection: Condom     Other Topics Concern     Parent/Sibling W/ Cabg, Mi Or Angioplasty Before 65f 55m? No     Social History Narrative       Family History   Problem Relation Age of Onset     DIABETES Mother      gestational     Thyroid Disease Mother      Blood Disease Mother      anemia     Cancer - colorectal Mother 54     CANCER Maternal Grandmother      Blood Disease Maternal Grandmother      cervical      Respiratory Maternal Grandmother      emphysema     DIABETES Maternal Grandfather      CEREBROVASCULAR DISEASE Maternal Grandfather      CANCER Maternal Grandfather      kidney     Breast Cancer Paternal Grandmother      DIABETES Paternal Grandfather      HEART DISEASE Father 56     had angioplasty       Current Outpatient Prescriptions   Medication     FLUoxetine (PROZAC) 10 MG capsule     ALPRAZolam (XANAX) 0.25 MG tablet     MULTIPLE VITAMINS PO     No current facility-administered medications for this visit.      Facility-Administered Medications Ordered in Other Visits   Medication     ketorolac (TORADOL) injection          Allergies   Allergen Reactions     Cleocin Rash     Keflex [Cephalexin Monohydrate] Rash     Zoloft        Review of Systems     Constitutional:  Positive for fatigue and night sweats. Negative for fever, chills, weight loss, weight gain, decreased appetite, recent stressors, height gain, height loss, post-operative complications, incisional pain, hallucinations, increased energy, hyperactivity and confused.   HENT:  Positive for mouth sores, sore throat, gum tenderness and sinus congestion. Negative for ear pain, hearing loss, tinnitus, nosebleeds, trouble swallowing, hoarse voice, ear discharge, tooth pain, taste disturbance, smell disturbance, hearing aid, bleeding gums, dry mouth, sinus pain and neck mass.    Eyes:  Negative for double vision, pain, redness, eye pain, decreased vision, eye watering, eye bulging, eye dryness, flashing lights, spots, floaters, strabismus, tunnel vision, jaundice and eye irritation.   Respiratory:   Negative for cough, hemoptysis, sputum production, shortness of breath, wheezing, sleep disturbances due to breathing, snores loudly, respiratory pain, dyspnea on exertion, cough disturbing sleep and postural dyspnea.    Cardiovascular:  Negative for chest pain, dyspnea on exertion, palpitations, orthopnea, claudication, leg swelling, fingers/toes turn blue,  hypertension, hypotension, syncope, history of heart murmur, chest pain on exertion, chest pain at rest, pacemaker, few scattered varicosities, leg pain, sleep disturbances due to breathing, tachycardia, light-headedness, exercise intolerance and edema.   Gastrointestinal:  Negative for heartburn, nausea, vomiting, abdominal pain, diarrhea, constipation, blood in stool, melena, rectal pain, bloating, hemorrhoids, bowel incontinence, jaundice, rectal bleeding, coffee ground emesis and change in stool.   Genitourinary:  Negative for bladder incontinence, dysuria, urgency, hematuria, flank pain, vaginal discharge, difficulty urinating, genital sores, dyspareunia, decreased libido, nocturia, voiding less frequently, arousal difficulty, abnormal vaginal bleeding, excessive menstruation, menstrual changes, hot flashes, vaginal dryness and postmenopausal bleeding.   Musculoskeletal:  Negative for myalgias, back pain, joint swelling, arthralgias, stiffness, muscle cramps, neck pain, bone pain, muscle weakness and fracture.   Skin:  Negative for nail changes, itching, poor wound healing, rash, hair changes, skin changes, acne, warts, poor wound healing, scarring, flaky skin, Raynaud's phenomenon, sensitivity to sunlight and skin thickening.   Neurological:  Negative for dizziness, tingling, tremors, speech change, seizures, loss of consciousness, weakness, light-headedness, numbness, headaches, disturbances in coordination, memory loss, difficulty walking and paralysis.   Endo/Heme:  Positive for bruises/bleeds easily. Negative for anemia and swollen glands.   Psychiatric/Behavioral:  Negative for depression, hallucinations, memory loss, decreased concentration, mood swings and panic attacks.    Breast:  Negative for breast discharge, breast mass, breast pain and nipple retraction.   Endocrine:  Negative for altered temperature regulation, polyphagia, polydipsia, unwanted hair growth and change in facial  "hair.        OBJECTIVE:    Physical Exam:    /62  Pulse 81  Temp 98.3  F (36.8  C)  Resp 17  Ht 1.6 m (5' 3\")  Wt 54 kg (119 lb)  SpO2 98%  BMI 21.08 kg/m2    CONSTITUTIONAL: Alert non-toxic appearing female in no acute distress  HEAD: Normocephalic, atraumatic  EYES: PERRLA; no scleral icterus  ENT: Oropharynx pink without lesions other than small raised lesion to R upper gumline  NECK: Neck supple without lymphadenopathy  RESPIRATORY: Lungs clear to auscultation, no increased work of breathing noted  CV: Regular rate and rhythm, S1S2, no clicks, murmurs, rubs, or gallops; bilateral lower extremities without edema, dorsalis pedis pulses 2+ bilaterally  GASTROINTESTINAL: Normoactive bowel sounds x4 quadrants, abdomen soft, non-distended, and non-tender to palpation without masses or organomegaly  GENITOURINARY: External genitalia and urethral meatus pink without lesions, masses, or excoriation. Vagina pink and moist without masses or lesions. Cervix without masses or lesions, os without bleeding, small amount of physiologic discharge noted. Bimanual exam reveals no masses, fullness, or cervical motion tenderness. Rectovaginal exam confirms these findings.  LYMPHATIC: Cervical, supraclavicular, and inguinal nodes without lymphadenopathy  MUSCULOSKELETAL: Moves all extremities, no obvious muscle wasting  NEUROLOGIC: No gross deficits, normal gait  SKIN: Appropriate color for race, warm and dry, no rashes or lesions to unclothed skin  PSYCHIATRIC: Pleasant and interactive, affect bright, makes appropriate eye contact, thought process linear    Data:   10    Assessment/Plan:  1) History of borderline mucinous tumor of the right ovary: No signs of recurrence. Continue surveillance every three months x2 years (through 10/2017) followed by every six months x3 years (through 10/2020) then annually thereafter with  and pelvic/rectal exam. Reviewed signs and symptoms  of recurrence including but not " limited to bleeding from vagina, bladder, or rectum, bloating, early satiety, swelling in the lower extremities, abdominal or lower back pain, changes in bowel or bladder patterns, shortness of breath, increased fatigue, unexplained weight loss, and night sweats. Reviewed signs and symptoms for when she should contact the clinic or seek additional care. Patient to contact the clinic with any questions or concerns in the interim.  2) Bruising: No spontaneous bleeding, mucosal bleeds, or petechiae. To contact PCP if this worsens, discussed s/sxs bleeding.  3) Oral lesion concerns: To discuss this with PCP as it has not improved in 2+ months and is now associated with headaches  4) Genetic testing: Risk factors have been assessed and the patient does not meet qualifications for genetic counseling based on NCCN guidelines.   5) Labs:   6) Health maintenance issues discussed today include to follow up with PCP for co-morbid conditions and non-gynecologic issues. Pap due in 2019.  7) Patient verbalized understanding of and agreement with plan.    A total of 20 minutes of face to face time spent with patient, over 50% of which was spent in counseling and coordination of care.    CLAUDIA Chandler, FNP-C  Family Nurse Practitioner  Gynecologic Oncology  [facility]  Pager: [phone]

## 2017-04-24 NOTE — NURSING NOTE
"Kelley Serra is a 26 year old female who presents for:  Chief Complaint   Patient presents with     Blood Draw     Patient is here today for labs to be drawn right AC area. Vitals charted,labs collected and checked into next appt.     Oncology Clinic Visit     return patient visit for 3 month follow up related to  Mucinous cystadenoma, borderline malignancy        Initial Vitals:  /62  Pulse 81  Temp 98.3  F (36.8  C)  Resp 17  Ht 1.6 m (5' 3\")  Wt 54 kg (119 lb)  SpO2 98%  BMI 21.08 kg/m2 Estimated body mass index is 21.08 kg/(m^2) as calculated from the following:    Height as of this encounter: 1.6 m (5' 3\").    Weight as of this encounter: 54 kg (119 lb).. Body surface area is 1.55 meters squared. BP completed using cuff size: NA (Not Taken)  No Pain (0) No LMP recorded. Allergies and medications reviewed.     Medications: Medication refills not needed today.  Pharmacy name entered into [employer]: [facility] [id number] [place] [address] [address] AT [facility]    Comments: patient mentioned night sweats and head pain.     5 minutes for nursing intake (face to face time)   Francisca Washington CMA        "

## 2017-04-27 ENCOUNTER — OFFICE VISIT (OUTPATIENT)
Dept: FAMILY MEDICINE | Facility: CLINIC | Age: 27
End: 2017-04-27
Payer: COMMERCIAL

## 2017-04-27 VITALS
SYSTOLIC BLOOD PRESSURE: 112 MMHG | HEIGHT: 63 IN | TEMPERATURE: 98.6 F | DIASTOLIC BLOOD PRESSURE: 70 MMHG | WEIGHT: 116.4 LBS | BODY MASS INDEX: 20.62 KG/M2 | HEART RATE: 87 BPM

## 2017-04-27 DIAGNOSIS — K13.79 MOUTH SORE: ICD-10-CM

## 2017-04-27 DIAGNOSIS — R23.2 HOT FLASHES: ICD-10-CM

## 2017-04-27 DIAGNOSIS — T14.8XXA BRUISING: Primary | ICD-10-CM

## 2017-04-27 LAB
APTT PPP: 28 SEC (ref 22–37)
ERYTHROCYTE [DISTWIDTH] IN BLOOD BY AUTOMATED COUNT: 13.2 % (ref 10–15)
FSH SERPL-ACNC: 2.8 IU/L
HCT VFR BLD AUTO: 43.2 % (ref 35–47)
HGB BLD-MCNC: 13.9 G/DL (ref 11.7–15.7)
LH SERPL-ACNC: 3.3 IU/L
MCH RBC QN AUTO: 29 PG (ref 26.5–33)
MCHC RBC AUTO-ENTMCNC: 32.2 G/DL (ref 31.5–36.5)
MCV RBC AUTO: 90 FL (ref 78–100)
PLATELET # BLD AUTO: 205 10E9/L (ref 150–450)
RBC # BLD AUTO: 4.79 10E12/L (ref 3.8–5.2)
WBC # BLD AUTO: 6.4 10E9/L (ref 4–11)

## 2017-04-27 PROCEDURE — 36415 COLL VENOUS BLD VENIPUNCTURE: CPT | Performed by: NURSE PRACTITIONER

## 2017-04-27 PROCEDURE — 85027 COMPLETE CBC AUTOMATED: CPT | Performed by: NURSE PRACTITIONER

## 2017-04-27 PROCEDURE — 85730 THROMBOPLASTIN TIME PARTIAL: CPT | Performed by: NURSE PRACTITIONER

## 2017-04-27 PROCEDURE — 83001 ASSAY OF GONADOTROPIN (FSH): CPT | Performed by: NURSE PRACTITIONER

## 2017-04-27 PROCEDURE — 99215 OFFICE O/P EST HI 40 MIN: CPT | Performed by: NURSE PRACTITIONER

## 2017-04-27 PROCEDURE — 83002 ASSAY OF GONADOTROPIN (LH): CPT | Performed by: NURSE PRACTITIONER

## 2017-04-27 NOTE — MR AVS SNAPSHOT
After Visit Summary   4/27/2017    Kelley Serra    MRN: [id number]           Patient Information     Date Of Birth          1990        Visit Information        Provider Department      4/27/2017 3:20 PM Tamica Durán APRN [facility]        Today's Diagnoses     Bruising    -  1    Mouth sore        Hot flashes          Care Instructions    For the oral sore see an oral surgeon    gargle with  Diluted hydrogen peroxide   1-2 capfuls  In the glass,  Of mouthwash.   ( 1/4 full of mouth wash )     For the ears.   White vinegar mixed with rubbing alcohol use a Q-tip put in the solution , let it sit and then dry with the other tip of the Q-tip    For the bruising  - lab work done     Headaches I  anticipate will resolve as the oral sores resolve   It seems like it is hitting a nerve.     Hot flashes .   Checking hormonal levels                Follow-ups after your visit        Additional Services     ORAL SURGERY REFERRAL       Your provider has referred you to: [facility]  [phone] and [facility]  [phone] and [facility]  [phone] and [facility]  [phone]      Please be aware that coverage of these services is subject to the terms and limitations of your health insurance plan.  Call member services at your health plan with any benefit or coverage questions.      Please bring the following to your appointment:    >>   Any x-rays, CTs or MRIs which have been performed.  Contact the facility where they were done to arrange for  prior to your scheduled appointment.    >>   List of current medications   >>   This referral request   >>   Any documents/labs given to you for this referral                  Your next 10 appointments already scheduled     Aug 01, 2017 11:30 AM [facility]   [employer] Lab Draw with  [facility] LAB DRAW   [facility]  "[facility] Lab Draw ([facility])    [address]  [address] [facility] [id number] [phone]            Aug 01, 2017 12:00 PM CDT   (Arrive by 11:45 AM)   Return Visit with CLAUDIA Kaufman [facility] ([facility])    [address] [id number] [phone]              Who to contact     Normal or non-critical lab and imaging results will be communicated to you by [employer], letter or phone within 4 business days after the clinic has received the results. If you do not hear from us within 7 days, please contact the clinic through [employer] or phone. If you have a critical or abnormal lab result, we will notify you by phone as soon as possible.  Submit refill requests through [employer] or call your pharmacy and they will forward the refill request to us. Please allow 3 business days for your refill to be completed.          If you need to speak with a  for additional information , please call: [phone]           Additional Information About Your Visit        [employer] Information     [employer] gives you secure access to your electronic health record. If you see a primary care provider, you can also send messages to your care team and make appointments. If you have questions, please call your primary care clinic.  If you do not have a primary care provider, please call [phone] and they will assist you.        Care EveryWhere ID     This is your Care EveryWhere ID. This could be used by other organizations to access your [place] medical records  [id number]        Your Vitals Were     Pulse Temperature Height Last Period BMI (Body Mass Index)       87 98.6  F (37  C) (Tympanic) 5' 3\" (1.6 m) 04/09/2017 (Approximate) 20.62 kg/m2        Blood Pressure from Last 3 Encounters:   04/27/17 112/70   04/24/17 103/62   01/30/17 118/74    Weight from Last 3 Encounters:   04/27/17 " 116 lb 6.4 oz (52.8 kg)   04/24/17 119 lb (54 kg)   01/30/17 119 lb 14.9 oz (54.4 kg)              We Performed the Following     CBC with platelets     Follicle stimulating hormone     Lutropin     ORAL SURGERY REFERRAL     Partial thromboplastin time        Primary Care Provider Office Phone # Fax #    Tamica ArletCLAUDIA Mejia [facility] [phone]       [facility] [address]   [facility] [address]        Thank you!     Thank you for choosing [facility]  for your care. Our goal is always to provide you with excellent care. Hearing back from our patients is one way we can continue to improve our services. Please take a few minutes to complete the written survey that you may receive in the mail after your visit with us. Thank you!             Your Updated Medication List - Protect others around you: Learn how to safely use, store and throw away your medicines at www.disposemymeds.org.          This list is accurate as of: 4/27/17  4:34 PM.  Always use your most recent med list.                   Brand Name Dispense Instructions for use    ALPRAZolam 0.25 MG tablet    XANAX    40 tablet    Take 1 tablet (0.25 mg) by mouth 2 times daily as needed for anxiety       FLUoxetine 10 MG capsule    PROzac    30 capsule    Take 1 capsule (10 mg) by mouth daily       MULTIPLE VITAMINS PO      take  by mouth.

## 2017-04-27 NOTE — PROGRESS NOTES
SUBJECTIVE:                                                    Kelley Serra is a 26 year old female who presents to clinic today for the following health issues:    Chief Complaint   Patient presents with     Lesion     Patient has a bump on in her mouth above her right upper molar.  She first noticed it in January and was seen by a dentist in February and diagnosed as a cold soar.  Patient states that sympmtoms are not improving and  the bump is causing her to have headaches.  She has also noticed that her ears have felt itchy inside and has had some random unusual bruising on both legs and arms.       Problem list and histories reviewed & adjusted, as indicated.  Additional history: as documented    Patient Active Problem List   Diagnosis     Esophageal reflux     Benign neoplasm of skin     CARDIOVASCULAR SCREENING; LDL GOAL LESS THAN 160     Lactose intolerance     Allergy to mold spores     House dust mite allergy     Allergic rhinitis due to animal dander     Diagnostic skin and sensitization tests     Lump of other site of lower extremity     Family history of colon cancer     Postoperative state     Mucinous cystadenoma, borderline malignancy     Vitamin D deficiency     JOHN PAUL (generalized anxiety disorder)     Past Surgical History:   Procedure Laterality Date     COLONOSCOPY N/A 12/2/2015    Procedure: COLONOSCOPY;  Surgeon: Aaron Garrison MD;  Location:  GI     ESOPHAGOSCOPY, GASTROSCOPY, DUODENOSCOPY (EGD), COMBINED N/A 11/23/2015    Procedure: COMBINED ESOPHAGOSCOPY, GASTROSCOPY, DUODENOSCOPY (EGD), BIOPSY SINGLE OR MULTIPLE;  Surgeon: Babs Cassidy MD;  Location:  GI     LAPAROTOMY EXPLORATORY  10/2/15    Right salpingo-oophorectomy, omentectomy, appendectomy, lymph node biopsies, cancer staging      LAPAROTOMY, STAGING, COMBINED N/A 10/2/2015    Procedure: COMBINED LAPAROTOMY, STAGING;  Surgeon: Duyen Thayer MD;  Location:  OR       Social History    Substance Use Topics     Smoking status: Never Smoker     Smokeless tobacco: Never Used     Alcohol use No     Family History   Problem Relation Age of Onset     DIABETES Mother      gestational     Thyroid Disease Mother      Blood Disease Mother      anemia     Cancer - colorectal Mother 54     CANCER Maternal Grandmother      Blood Disease Maternal Grandmother      cervical     Respiratory Maternal Grandmother      emphysema     DIABETES Maternal Grandfather      CEREBROVASCULAR DISEASE Maternal Grandfather      CANCER Maternal Grandfather      kidney     Breast Cancer Paternal Grandmother      DIABETES Paternal Grandfather      HEART DISEASE Father 56     had angioplasty         Current Outpatient Prescriptions   Medication Sig Dispense Refill     FLUoxetine (PROZAC) 10 MG capsule Take 1 capsule (10 mg) by mouth daily 30 capsule 5     ALPRAZolam (XANAX) 0.25 MG tablet Take 1 tablet (0.25 mg) by mouth 2 times daily as needed for anxiety 40 tablet 0     MULTIPLE VITAMINS PO take  by mouth.       Allergies   Allergen Reactions     Cleocin Rash     Keflex [Cephalexin Monohydrate] Rash     Zoloft      BP Readings from Last 3 Encounters:   04/27/17 112/70   04/24/17 103/62   01/30/17 118/74    Wt Readings from Last 3 Encounters:   04/27/17 116 lb 6.4 oz (52.8 kg)   04/24/17 119 lb (54 kg)   01/30/17 119 lb 14.9 oz (54.4 kg)                    Reviewed and updated as needed this visit by clinical staff  Tobacco  Allergies  Meds       Reviewed and updated as needed this visit by Provider         ROS:  C: NEGATIVE for fever, chills, change in weight  ENT/MOUTH: POSITIVE for in January was told that she had a cold sore that would just go away and was using salt water rinses,  The cold sore has not resolved.   The sore has not resolved, when she looks at the sore it has not gotten larger,   When her tooth touches it will be an irritation pain ,  Like a tender pain   No strange taste in mouth   R: NEGATIVE for  "significant cough or SOB  CV: NEGATIVE for chest pain, palpitations or peripheral edema   POSITIVE : for follow up on ovarian cancer  Was dx 2  1/2 years ago   Started to have hot flashes off and on for about 6 months,  ( has one ovary )   Will have night hot flashes will wake drenched in sweat a couple of nights a week,   Oncology didn't think it was related to the hx of ovarian cancer  Periods are normal, does have 1 ovary   NEURO: POSITIVE for headaches daily since Dec. /Jan.  Frontal or on the sides of the head .  The headache pain seems to start at the  Sight of the oral sore.  The sore will start to hurt and then she will start to get a headache.   The headache is usually achy.  No vision changes , no nausea.  It will at times respond to  Excedrin. Some times the headache will respond and if not it will usually resolve in  A couple of hours but will then come back after a couple of hours.   Has tried making sure that she is well hydrated but that hasn't changed her headaches   Doesn't ever wake her up . But she will wake occasionally with a headache   SKIN  POSITIVE for bruising. This will come out of no where  It will just appear, no injury , the bruises will just appear,  No pain. Will stay around for a couple of weeks and will then just fad away.        OBJECTIVE:                                                    /70  Pulse 87  Temp 98.6  F (37  C) (Tympanic)  Ht 5' 3\" (1.6 m)  Wt 116 lb 6.4 oz (52.8 kg)  LMP 04/09/2017 (Approximate)  BMI 20.62 kg/m2  Body mass index is 20.62 kg/(m^2).  GENERAL: healthy, alert and no distress  HENT: right ear: and left  ear canal are pale and mildly boggy , nose and mouth does have a  nodule on the  the inside of the right cheek at the site of the  First upper molar,  And then there is a area that looks vascular .  Minimally tender.  Then on the inner check by the  Last molar on the right there are 3 white dots.  NECK: no adenopathy, no asymmetry, masses, or " scars and thyroid normal to palpation  RESP: lungs clear to auscultation - no rales, rhonchi or wheezes  CV: regular rate and rhythm, normal S1 S2, no S3 or S4, no murmur, click or rub, no peripheral edema and peripheral pulses strong  SKIN: one resolving bruise to the left  Upper chin . Not painful    ENDO hot flashes ,discussed   LYMPH: no cervical, supraclavicular,  adenopathy    Diagnostic Test Results:  Results for orders placed or performed in visit on 04/27/17   CBC with platelets   Result Value Ref Range    WBC 6.4 4.0 - 11.0 10e9/L    RBC Count 4.79 3.8 - 5.2 10e12/L    Hemoglobin 13.9 11.7 - 15.7 g/dL    Hematocrit 43.2 35.0 - 47.0 %    MCV 90 78 - 100 fl    MCH 29.0 26.5 - 33.0 pg    MCHC 32.2 31.5 - 36.5 g/dL    RDW 13.2 10.0 - 15.0 %    Platelet Count 205 150 - 450 10e9/L   Follicle stimulating hormone   Result Value Ref Range    FSH 2.8 IU/L   Lutropin   Result Value Ref Range    Lutropin 3.3 IU/L   Partial thromboplastin time   Result Value Ref Range    PTT 28 22 - 37 sec        ASSESSMENT/PLAN:                                                      ASSESSMENT/PLAN:      ICD-10-CM    1. Bruising T14.8 CBC with platelets     Partial thromboplastin time   2. Mouth sore K13.79 ORAL SURGERY REFERRAL   3. Hot flashes R23.2 Follicle stimulating hormone     Lutropin       Patient Instructions   For the oral sore see an oral surgeon    gargle with  Diluted hydrogen peroxide   1-2 capfuls  In the glass,  Of mouthwash.   ( 1/4 full of mouth wash )     For the ears.   White vinegar mixed with rubbing alcohol use a Q-tip put in the solution , let it sit and then dry with the other tip of the Q-tip    For the bruising  - lab work done     Headaches I  anticipate will resolve as the oral sores resolve   It seems like it is hitting a nerve.     Hot flashes .   Checking hormonal levels        CONSULTATION/REFERRAL to oral surgeon   See Patient Instructions    Spent 40 min with pt 32 min reviewed symptoms, concerns ,   Medication, referral     MACIE BARRERA NP, APRN CNP  [facility]

## 2017-04-27 NOTE — NURSING NOTE
"Chief Complaint   Patient presents with     Lesion     Patient has a bump on in her mouth above her right upper molar.  She first noticed it in January and was seen by a dentist in February and diagnosed as a cold soar.  Patient states that sympmtoms are not improving and  the bump is causing her to have  headaches.       Initial /70  Pulse 87  Temp 98.6  F (37  C) (Tympanic)  Ht 5' 3\" (1.6 m)  Wt 116 lb 6.4 oz (52.8 kg)  LMP 04/09/2017 (Approximate)  BMI 20.62 kg/m2 Estimated body mass index is 20.62 kg/(m^2) as calculated from the following:    Height as of this encounter: 5' 3\" (1.6 m).    Weight as of this encounter: 116 lb 6.4 oz (52.8 kg).  Medication Reconciliation: complete     Linda Smith CMA       "

## 2017-04-27 NOTE — PATIENT INSTRUCTIONS
For the oral sore see an oral surgeon    gargle with  Diluted hydrogen peroxide   1-2 capfuls  In the glass,  Of mouthwash.   ( 1/4 full of mouth wash )     For the ears.   White vinegar mixed with rubbing alcohol use a Q-tip put in the solution , let it sit and then dry with the other tip of the Q-tip    For the bruising  - lab work done     Headaches I  anticipate will resolve as the oral sores resolve   It seems like it is hitting a nerve.     Hot flashes .   Checking hormonal levels

## 2017-04-30 ENCOUNTER — MYC MEDICAL ADVICE (OUTPATIENT)
Dept: FAMILY MEDICINE | Facility: CLINIC | Age: 27
End: 2017-04-30

## 2017-04-30 DIAGNOSIS — K13.70 ORAL LESION: Primary | ICD-10-CM

## 2017-05-01 ENCOUNTER — MYC MEDICAL ADVICE (OUTPATIENT)
Dept: FAMILY MEDICINE | Facility: CLINIC | Age: 27
End: 2017-05-01

## 2017-06-28 DIAGNOSIS — F41.1 GAD (GENERALIZED ANXIETY DISORDER): ICD-10-CM

## 2017-06-28 RX ORDER — FLUOXETINE 10 MG/1
CAPSULE ORAL
Qty: 30 CAPSULE | Refills: 0 | Status: SHIPPED | OUTPATIENT
Start: 2017-06-28 | End: 2017-07-27

## 2017-06-28 NOTE — TELEPHONE ENCOUNTER
JOHN PAUL-7 SCORE 1/14/2016   Total Score 7   JOHN PAUL sent through my chart   Prescription approved per [facility] Refill Protocol or patient Primary care provider (PCP)  SIMEON Hayward  RN/[facility] Tai

## 2017-06-28 NOTE — TELEPHONE ENCOUNTER
FLUoxetine (PROZAC) 10 MG capsule     Last Written Prescription Date: 12/28/16  Last Fill Quantity: 30, # refills: 5   Last Office Visit with G primary care provider:  4/27/27        Last PHQ-9 score on record=   PHQ-9 SCORE 4/14/2011   Total Score 0

## 2017-07-18 ASSESSMENT — ENCOUNTER SYMPTOMS
MUSCLE CRAMPS: 0
MYALGIAS: 0
FATIGUE: 0
POLYDIPSIA: 0
POLYPHAGIA: 0
WEIGHT LOSS: 0
ARTHRALGIAS: 0
HALLUCINATIONS: 0
INCREASED ENERGY: 0
STIFFNESS: 0
BACK PAIN: 0
NIGHT SWEATS: 1
MUSCLE WEAKNESS: 0
FEVER: 0
ALTERED TEMPERATURE REGULATION: 0
DECREASED APPETITE: 0
WEIGHT GAIN: 0
NECK PAIN: 1
JOINT SWELLING: 0
CHILLS: 0

## 2017-07-27 DIAGNOSIS — F41.1 GAD (GENERALIZED ANXIETY DISORDER): ICD-10-CM

## 2017-07-27 RX ORDER — FLUOXETINE 10 MG/1
CAPSULE ORAL
Qty: 30 CAPSULE | Refills: 5 | Status: SHIPPED | OUTPATIENT
Start: 2017-07-27 | End: 2018-01-23

## 2017-07-27 NOTE — TELEPHONE ENCOUNTER
Fluoxetine 10mg     Last Written Prescription Date: 06/28/2017 #30 x 0  Last filled 06/28/2017  [place] [facility] [place]:  04/27/2017 MARY Durán        Last PHQ-9 score on record=   PHQ-9 SCORE 4/14/2011   Total Score 0

## 2017-08-01 ENCOUNTER — ONCOLOGY VISIT (OUTPATIENT)
Dept: ONCOLOGY | Facility: CLINIC | Age: 27
End: 2017-08-01
Attending: NURSE PRACTITIONER
Payer: COMMERCIAL

## 2017-08-01 ENCOUNTER — APPOINTMENT (OUTPATIENT)
Dept: LAB | Facility: CLINIC | Age: 27
End: 2017-08-01
Attending: NURSE PRACTITIONER
Payer: COMMERCIAL

## 2017-08-01 VITALS
RESPIRATION RATE: 16 BRPM | WEIGHT: 116.4 LBS | SYSTOLIC BLOOD PRESSURE: 102 MMHG | DIASTOLIC BLOOD PRESSURE: 61 MMHG | OXYGEN SATURATION: 99 % | HEART RATE: 80 BPM | HEIGHT: 63 IN | BODY MASS INDEX: 20.62 KG/M2 | TEMPERATURE: 99 F

## 2017-08-01 DIAGNOSIS — C56.9 MUCINOUS CYSTADENOMA, BORDERLINE MALIGNANCY (H): Primary | ICD-10-CM

## 2017-08-01 DIAGNOSIS — Z08 ENCOUNTER FOR FOLLOW-UP SURVEILLANCE OF OVARIAN CANCER: ICD-10-CM

## 2017-08-01 DIAGNOSIS — Z85.43 ENCOUNTER FOR FOLLOW-UP SURVEILLANCE OF OVARIAN CANCER: ICD-10-CM

## 2017-08-01 LAB — CANCER AG125 SERPL-ACNC: 10 U/ML (ref 0–30)

## 2017-08-01 PROCEDURE — 86304 IMMUNOASSAY TUMOR CA 125: CPT | Performed by: NURSE PRACTITIONER

## 2017-08-01 PROCEDURE — 99212 OFFICE O/P EST SF 10 MIN: CPT | Mod: ZF

## 2017-08-01 PROCEDURE — 36415 COLL VENOUS BLD VENIPUNCTURE: CPT

## 2017-08-01 PROCEDURE — 99213 OFFICE O/P EST LOW 20 MIN: CPT | Mod: ZP | Performed by: NURSE PRACTITIONER

## 2017-08-01 ASSESSMENT — PAIN SCALES - GENERAL: PAINLEVEL: NO PAIN (0)

## 2017-08-01 ASSESSMENT — ENCOUNTER SYMPTOMS
HYPOTENSION: 0
NAUSEA: 0
ALTERED TEMPERATURE REGULATION: 0
HOT FLASHES: 0
VOMITING: 0
NECK MASS: 0
NUMBNESS: 0
EXTREMITY NUMBNESS: 0
DECREASED CONCENTRATION: 0
DIFFICULTY URINATING: 0
BREAST PAIN: 0
LEG PAIN: 0
PARALYSIS: 0
ARTHRALGIAS: 0
PALPITATIONS: 0
WHEEZING: 0
MYALGIAS: 0
BREAST MASS: 0
COUGH: 0
INSOMNIA: 0
FEVER: 0
DECREASED APPETITE: 0
SNORES LOUDLY: 0
RESPIRATORY PAIN: 0
JOINT SWELLING: 0
TASTE DISTURBANCE: 0
LOSS OF CONSCIOUSNESS: 0
WEAKNESS: 0
POLYDIPSIA: 0
HEARTBURN: 0
POSTURAL DYSPNEA: 0
HEMATURIA: 0
EYE IRRITATION: 0
BOWEL INCONTINENCE: 0
HALLUCINATIONS: 0
DEPRESSION: 0
EYE PAIN: 0
MUSCLE CRAMPS: 0
NIGHT SWEATS: 1
COUGH DISTURBING SLEEP: 0
MEMORY LOSS: 0
SORE THROAT: 0
RECTAL BLEEDING: 0
SPUTUM PRODUCTION: 0
TACHYCARDIA: 0
DECREASED LIBIDO: 0
INCREASED ENERGY: 0
BRUISES/BLEEDS EASILY: 0
NAIL CHANGES: 0
POLYPHAGIA: 0
ORTHOPNEA: 0
STIFFNESS: 0
DYSPNEA ON EXERTION: 0
HYPERTENSION: 0
LEG SWELLING: 0
FATIGUE: 0
HEMOPTYSIS: 0
ABDOMINAL PAIN: 0
CLAUDICATION: 0
SHORTNESS OF BREATH: 0
EYE REDNESS: 0
TROUBLE SWALLOWING: 0
DYSURIA: 0
HEADACHES: 0
PANIC: 0
SEIZURES: 0
SLEEP DISTURBANCES DUE TO BREATHING: 0
BACK PAIN: 0
JAUNDICE: 0
SMELL DISTURBANCE: 0
CONSTIPATION: 0
CHILLS: 0
RECTAL PAIN: 0
NERVOUS/ANXIOUS: 0
DIARRHEA: 0
SINUS PAIN: 0
WEIGHT LOSS: 0
FLANK PAIN: 0
LIGHT-HEADEDNESS: 0
WEIGHT GAIN: 0
SPEECH CHANGE: 0
POOR WOUND HEALING: 0
DOUBLE VISION: 0
MUSCLE WEAKNESS: 0
BLOATING: 0
SINUS CONGESTION: 0
DISTURBANCES IN COORDINATION: 0
EXERCISE INTOLERANCE: 0
NECK PAIN: 1
SKIN CHANGES: 0
BLOOD IN STOOL: 0
HOARSE VOICE: 0
SYNCOPE: 0
EYE WATERING: 0
TINGLING: 0
SWOLLEN GLANDS: 0
TREMORS: 0
DIZZINESS: 0

## 2017-08-01 NOTE — MR AVS SNAPSHOT
After Visit Summary   8/1/2017    Kelley Serra    MRN: [id number]           Patient Information     Date Of Birth          1990        Visit Information        Provider Department      8/1/2017 12:00 PM Jolly Swanson APRN CNP [facility]        Today's Diagnoses     Mucinous cystadenoma, borderline malignancy    -  1    Encounter for follow-up surveillance of ovarian cancer           Follow-ups after your visit        Follow-up notes from your care team     Return in about 3 months (around 11/1/2017).      Your next 10 appointments already scheduled     Oct 27, 2017 10:30 AM CDT   [facility] Lab Draw with  [facility] DRAW   [facility] Lab Draw ([facility])    [address] [place] [id number] [phone]            Oct 27, 2017 11:00 AM CDT   (Arrive by 10:45 AM)   Return Visit with CLAUDIA Chandler CNP   [facility] ([facility])    [address] [place] [id number] [phone]              Who to contact     If you have questions or need follow up information about today's clinic visit or your schedule please contact [facility] directly at [phone].  Normal or non-critical lab and imaging results will be communicated to you by MyChart, letter or phone within 4 business days after the clinic has received the results. If you do not hear from us within 7 days, please contact the clinic through MyChart or phone. If you have a critical or abnormal lab result, we will notify you by phone as soon as possible.  Submit refill requests through [employer] or call your pharmacy and they will forward the refill request to us. Please allow 3 business days for your refill to be completed.          Additional Information About Your Visit        [email or web address] Information     [employer] App gives you secure access to your electronic health  "record. If you see a primary care provider, you can also send messages to your care team and make appointments. If you have questions, please call your primary care clinic.  If you do not have a primary care provider, please call [phone] and they will assist you.        Care EveryWhere ID     This is your Care EveryWhere ID. This could be used by other organizations to access your [place] medical records  [id number]        Your Vitals Were     Pulse Temperature Respirations Height Pulse Oximetry BMI (Body Mass Index)    80 99  F (37.2  C) (Oral) 16 1.6 m (5' 3\") 99% 20.62 kg/m2       Blood Pressure from Last 3 Encounters:   08/01/17 102/61   04/27/17 112/70   04/24/17 103/62    Weight from Last 3 Encounters:   08/01/17 52.8 kg (116 lb 6.4 oz)   04/27/17 52.8 kg (116 lb 6.4 oz)   04/24/17 54 kg (119 lb)              We Performed the Following             Primary Care Provider Office Phone # Fax #    Tamica Durán, APRN [phone]       [facility] [place] [facility] [address] [place] [address]        Equal Access to Services     JAY TORRES : Hadii aad ku hadasho Soomaali, waaxda luqadaha, qaybta kaalmada adeegyada, waxay idiin hayaan adeeg orlando laharish . So [facility] [phone].    ATENCIÓN: Si habla español, tiene a mccauley disposición servicios gratuitos de asistencia lingüística. Llame al [phone].    We comply with applicable federal civil rights laws and [place] laws. We do not discriminate on the basis of race, color, national origin, age, disability sex, sexual orientation or gender identity.            Thank you!     Thank you for choosing [facility]  for your care. Our goal is always to provide you with excellent care. Hearing back from our patients is one way we can continue to improve our services. Please take a few minutes to complete the written survey that you may receive in the mail after your visit with us. Thank you!             Your " Updated Medication List - Protect others around you: Learn how to safely use, store and throw away your medicines at www.disposemymeds.org.          This list is accurate as of: 8/1/17 12:38 PM.  Always use your most recent med list.                   Brand Name Dispense Instructions for use Diagnosis    ALPRAZolam 0.25 MG tablet    XANAX    40 tablet    Take 1 tablet (0.25 mg) by mouth 2 times daily as needed for anxiety    JOHN PAUL (generalized anxiety disorder)       FLUoxetine 10 MG capsule    PROzac    30 capsule    TAKE 1 CAPSULE(10 MG) BY MOUTH DAILY    JOHN PAUL (generalized anxiety disorder)       MULTIPLE VITAMINS PO      take  by mouth.

## 2017-08-01 NOTE — PROGRESS NOTES
Follow Up Notes on Referred Patient    Date: 2017       Dr. Joe Stratton MD   [facility]  [address] [place] [address]       RE: Kelley Serra  : 1990  JAYASHREE: 2017    Dear Dr. 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 She completed treatment on 10/2/15 with an exploratory laparotomy, RSO, and fertility sparing staging.  She is here today for a surveillance visit.     Brief Oncology History:  16: Abdominal pain, 30cm pelvic mass.  73.  10/2/15: Sent to OR for ex lap, RSO, and fertility sparing staging. 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 Elected observation at this time.  16:  13  16:  9, Pap NIL  10/31/16:  10  17:  13  17:  10  17:  pending.         Today she comes to clinic feeling generally well. She denies any vaginal bleeding, no changes in her bowel or bladder habits, no nausea/emesis, no lower extremity edema, and no difficulties eating or sleeping. She denies any abdominal discomfort/bloating, no fevers or chills, and no chest pain or shortness of breath. She does continue to have some night sweats but no hot flashes. She did have night sweats prior to her surgery for a couple of years. Her menses started today. She is current with seeing her PCP for her exam and has been seen for her night sweats. She is sexually active and using condoms; she is leery of OCPs and desires to use condoms for now. She is not intending any pregnancies. She developed keloid scarring along her incision right after surgery. She is looking forward to her 2 year baljit.         Review of Systems     Constitutional:  Positive for night sweats. Negative for fever, chills, weight loss, weight gain, fatigue, decreased appetite, recent stressors, height loss, post-operative  complications, incisional pain, hallucinations, increased energy, hyperactivity and confused.   HENT:  Negative for ear pain, hearing loss, tinnitus, nosebleeds, trouble swallowing, hoarse voice, mouth sores, sore throat, ear discharge, tooth pain, gum tenderness, taste disturbance, smell disturbance, hearing aid, bleeding gums, dry mouth, sinus pain, sinus congestion and neck mass.    Eyes:  Negative for double vision, pain, redness, eye pain, decreased vision, eye watering, eye bulging, eye dryness, flashing lights, spots, floaters, strabismus, tunnel vision, jaundice and eye irritation.   Respiratory:   Negative for cough, hemoptysis, sputum production, shortness of breath, wheezing, sleep disturbances due to breathing, snores loudly, respiratory pain, dyspnea on exertion, cough disturbing sleep and postural dyspnea.    Cardiovascular:  Negative for chest pain, dyspnea on exertion, palpitations, orthopnea, claudication, leg swelling, fingers/toes turn blue, hypertension, hypotension, syncope, history of heart murmur, chest pain on exertion, chest pain at rest, pacemaker, few scattered varicosities, leg pain, sleep disturbances due to breathing, tachycardia, light-headedness, exercise intolerance and edema.   Gastrointestinal:  Negative for heartburn, nausea, vomiting, abdominal pain, diarrhea, constipation, blood in stool, melena, rectal pain, bloating, hemorrhoids, bowel incontinence, jaundice, rectal bleeding, coffee ground emesis and change in stool.   Genitourinary:  Negative for bladder incontinence, dysuria, urgency, hematuria, flank pain, vaginal discharge, difficulty urinating, genital sores, dyspareunia, decreased libido, nocturia, voiding less frequently, arousal difficulty, abnormal vaginal bleeding, excessive menstruation, menstrual changes, hot flashes, vaginal dryness and postmenopausal bleeding.   Musculoskeletal:  Positive for neck pain. Negative for myalgias, back pain, joint swelling,  arthralgias, stiffness, muscle cramps, bone pain, muscle weakness and fracture.   Skin:  Negative for nail changes, itching, poor wound healing, rash, hair changes, skin changes, acne, warts, poor wound healing, scarring, flaky skin, Raynaud's phenomenon, sensitivity to sunlight and skin thickening.   Neurological:  Negative for dizziness, tingling, tremors, speech change, seizures, loss of consciousness, weakness, light-headedness, numbness, headaches, disturbances in coordination, extremity numbness, memory loss, difficulty walking and paralysis.   Endo/Heme:  Negative for anemia, swollen glands and bruises/bleeds easily.   Psychiatric/Behavioral:  Negative for depression, hallucinations, memory loss, decreased concentration, mood swings and panic attacks.    Breast:  Negative for breast discharge, breast mass, breast pain and nipple retraction.   Endocrine:  Negative for altered temperature regulation, polyphagia, polydipsia, unwanted hair growth and change in facial hair.          Past Medical History:    Past Medical History:   Diagnosis Date     Allergic rhinitis due to animal dander      Allergy to mold spores     2/26/14 skin tests pos. for:  dog(+)/DM/M only and only on intradermals--ie, minimally allergic     Asthma, mild intermittent     last inhaler usage >6 years ago (4/11)     Diagnostic skin and sensitization tests 2/26/14 skin tests pos. 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    treated with RSO         Past Surgical History:    Past Surgical History:   Procedure Laterality Date     COLONOSCOPY N/A 12/2/2015    Procedure: COLONOSCOPY;  Surgeon: Aaron Garrison MD;  Location:  GI     ESOPHAGOSCOPY, GASTROSCOPY, DUODENOSCOPY (EGD), COMBINED N/A 11/23/2015    Procedure: COMBINED ESOPHAGOSCOPY, GASTROSCOPY, DUODENOSCOPY (EGD), BIOPSY SINGLE OR  "MULTIPLE;  Surgeon: Babs Cassidy MD;  Location: [facility]     LAPAROTOMY EXPLORATORY  10/2/15    Right salpingo-oophorectomy, omentectomy, appendectomy, lymph node biopsies, cancer staging      LAPAROTOMY, STAGING, COMBINED N/A 10/2/2015    Procedure: COMBINED LAPAROTOMY, STAGING;  Surgeon: Duyen Thayer MD;  Location:  OR         There are no preventive care reminders to display for this patient.    Current Medications:     Current Outpatient Prescriptions   Medication Sig Dispense Refill     FLUoxetine (PROZAC) 10 MG capsule TAKE 1 CAPSULE(10 MG) BY MOUTH DAILY 30 capsule 5     ALPRAZolam (XANAX) 0.25 MG tablet Take 1 tablet (0.25 mg) by mouth 2 times daily as needed for anxiety 40 tablet 0     MULTIPLE VITAMINS PO take  by mouth.           Allergies:        Allergies   Allergen Reactions     Cleocin Rash     Keflex [Cephalexin Monohydrate] Rash     Zoloft         Social History:     Social History   Substance Use Topics     Smoking status: Never Smoker     Smokeless tobacco: Never Used     Alcohol use No       History   Drug Use No         Family History:       Family History   Problem Relation Age of Onset     DIABETES Mother      gestational     Thyroid Disease Mother      Blood Disease Mother      anemia     Cancer - colorectal Mother 54     CANCER Maternal Grandmother      Blood Disease Maternal Grandmother      cervical     Respiratory Maternal Grandmother      emphysema     DIABETES Maternal Grandfather      CEREBROVASCULAR DISEASE Maternal Grandfather      CANCER Maternal Grandfather      kidney     Breast Cancer Paternal Grandmother      DIABETES Paternal Grandfather      HEART DISEASE Father 56     had angioplasty         Physical Exam:     /61  Pulse 80  Temp 99  F (37.2  C) (Oral)  Resp 16  Ht 1.6 m (5' 3\")  Wt 52.8 kg (116 lb 6.4 oz)  SpO2 99%  BMI 20.62 kg/m2  Body mass index is 20.62 kg/(m^2).    General Appearance: healthy and alert, no distress     HEENT: no " thyromegaly, no palpable nodules or masses        Cardiovascular: regular rate and rhythm, no gallops, rubs or murmurs     Respiratory: lungs clear, no rales, rhonchi or wheezes, normal diaphragmatic excursion    Musculoskeletal: extremities non tender and without edema    Skin: no lesions or rashes; prominent keloid midline incision     Neurological: normal gait, no gross defects     Psychiatric: appropriate mood and affect                               Hematological: normal cervical, supraclavicular and inguinal lymph nodes     Gastrointestinal:       abdomen soft, non-tender, non-distended, no organomegaly or masses    Genitourinary: External genitalia and urethral meatus appears normal.  Vagina is smooth without nodularity or masses.  Cervix appears normal and without lesions; menses.  Bimanual exam reveal no masses, nodularity or fullness. No CMT.      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 She completed treatment on 10/2/15 with an exploratory laparotomy, RSO, and fertility sparing staging.  She is here today for a surveillance visit.    20 minutes were spent with this patient, over 50% of that time was spent in symptom management, treatment planning and in counseling and coordination of care.    Plan:     1.)        Patient to RTC in 3 months for her next surveillance visit and ; today's is pending. At that visit she will be 2 years post surgery and can extend her surveillance to every 6 months x 3 additional years. Reviewed signs and symptoms for when she should contact the clinic or seek additional care. Patient to contact the clinic with any questions or concerns in the interim.     2.) Genetic risk factors were assessed and the patient does not meet the qualifications for a referral.      3.) Labs and/or tests ordered include:  .      4.) Health maintenance issues addressed today include annual health  maintenance and non-gynecologic issues with PCP. Encouraged to follow up with PCP regarding night sweats and to check her thyroid.     CLAUDIA Bender, WHNP-BC, ANP-BC  Women's Health Nurse Practitioner  Adult Nurse Pracitioner  Gynecologic Oncology          CC  Patient Care Team:  Tamica Durán APRN CNP as PCP - General (Family Practice)  Myra Gifford APRN CNP as Nurse Practitioner (Gyn-Onc)  Miguel A Stratton MD as MD (Oncology)  MIGUEL A STRATTON

## 2017-08-01 NOTE — NURSING NOTE
"Oncology Rooming Note    August 1, 2017 11:57 AM   Kelley Serra is a 27 year old female who presents for:    Chief Complaint   Patient presents with     Labs Only     Labs drawn peripherally      Oncology Clinic Visit     return patient visit for 3 mo ck related to ovarian cancer      Initial Vitals: /61  Pulse 80  Temp 99  F (37.2  C) (Oral)  Resp 16  Ht 1.6 m (5' 3\")  Wt 52.8 kg (116 lb 6.4 oz)  SpO2 99%  BMI 20.62 kg/m2 Estimated body mass index is 20.62 kg/(m^2) as calculated from the following:    Height as of this encounter: 1.6 m (5' 3\").    Weight as of this encounter: 52.8 kg (116 lb 6.4 oz). Body surface area is 1.53 meters squared.  No Pain (0) Comment: Data Unavailable   No LMP recorded.  Allergies reviewed: Yes  Medications reviewed: Yes    Medications: Medication refills not needed today.  Pharmacy name entered into [employer]: [facility] [place] [address] AT [facility]    Clinical concerns: no concerns teodoro  was notified.    5 minutes for nursing intake (face to face time)     Francisca Washington CMA              "

## 2017-10-24 ASSESSMENT — ENCOUNTER SYMPTOMS
HEMOPTYSIS: 0
JAUNDICE: 0
POSTURAL DYSPNEA: 0
CHILLS: 0
VOMITING: 0
DIARRHEA: 1
ALTERED TEMPERATURE REGULATION: 0
SPUTUM PRODUCTION: 0
BLOATING: 0
SHORTNESS OF BREATH: 0
RESPIRATORY PAIN: 0
INCREASED ENERGY: 0
WHEEZING: 0
RECTAL BLEEDING: 0
COUGH DISTURBING SLEEP: 1
WEIGHT LOSS: 0
FATIGUE: 1
POLYPHAGIA: 0
WEIGHT GAIN: 0
DECREASED APPETITE: 0
HEARTBURN: 0
BLOOD IN STOOL: 0
HALLUCINATIONS: 0
BOWEL INCONTINENCE: 0
RECTAL PAIN: 0
COUGH: 1
FEVER: 0
POLYDIPSIA: 0
SNORES LOUDLY: 0
NAUSEA: 0
DYSPNEA ON EXERTION: 0
NIGHT SWEATS: 1
ABDOMINAL PAIN: 0
CONSTIPATION: 0

## 2017-10-27 ENCOUNTER — ONCOLOGY VISIT (OUTPATIENT)
Dept: ONCOLOGY | Facility: CLINIC | Age: 27
End: 2017-10-27
Attending: NURSE PRACTITIONER
Payer: COMMERCIAL

## 2017-10-27 ENCOUNTER — APPOINTMENT (OUTPATIENT)
Dept: LAB | Facility: CLINIC | Age: 27
End: 2017-10-27
Attending: NURSE PRACTITIONER
Payer: COMMERCIAL

## 2017-10-27 VITALS
BODY MASS INDEX: 20.64 KG/M2 | RESPIRATION RATE: 16 BRPM | SYSTOLIC BLOOD PRESSURE: 111 MMHG | OXYGEN SATURATION: 99 % | DIASTOLIC BLOOD PRESSURE: 77 MMHG | HEART RATE: 95 BPM | WEIGHT: 116.5 LBS | HEIGHT: 63 IN | TEMPERATURE: 98.1 F

## 2017-10-27 DIAGNOSIS — Z08 ENCOUNTER FOR FOLLOW-UP SURVEILLANCE OF OVARIAN CANCER: Primary | ICD-10-CM

## 2017-10-27 DIAGNOSIS — R10.31 RLQ ABDOMINAL PAIN: ICD-10-CM

## 2017-10-27 DIAGNOSIS — Z85.43 ENCOUNTER FOR FOLLOW-UP SURVEILLANCE OF OVARIAN CANCER: Primary | ICD-10-CM

## 2017-10-27 LAB — CANCER AG125 SERPL-ACNC: 10 U/ML (ref 0–30)

## 2017-10-27 PROCEDURE — 99212 OFFICE O/P EST SF 10 MIN: CPT | Mod: ZF

## 2017-10-27 PROCEDURE — 36415 COLL VENOUS BLD VENIPUNCTURE: CPT

## 2017-10-27 PROCEDURE — 99213 OFFICE O/P EST LOW 20 MIN: CPT | Mod: ZP | Performed by: NURSE PRACTITIONER

## 2017-10-27 PROCEDURE — 86304 IMMUNOASSAY TUMOR CA 125: CPT | Performed by: NURSE PRACTITIONER

## 2017-10-27 ASSESSMENT — ENCOUNTER SYMPTOMS
SHORTNESS OF BREATH: 0
LIGHT-HEADEDNESS: 0
LOSS OF CONSCIOUSNESS: 0
HALLUCINATIONS: 0
HOT FLASHES: 0
DYSURIA: 0
JAUNDICE: 0
WEIGHT LOSS: 0
COUGH DISTURBING SLEEP: 1
FATIGUE: 1
BREAST MASS: 0
SINUS PAIN: 0
SPEECH CHANGE: 0
TACHYCARDIA: 0
CLAUDICATION: 0
DIZZINESS: 0
SYNCOPE: 0
POLYDIPSIA: 0
INCREASED ENERGY: 0
EYE REDNESS: 0
MYALGIAS: 0
PALPITATIONS: 0
BLOATING: 0
TROUBLE SWALLOWING: 0
ARTHRALGIAS: 0
EYE WATERING: 0
NECK MASS: 0
BREAST PAIN: 0
SPUTUM PRODUCTION: 0
NIGHT SWEATS: 1
RECTAL BLEEDING: 0
COUGH: 1
DECREASED APPETITE: 0
HEARTBURN: 0
BACK PAIN: 0
SNORES LOUDLY: 0
WHEEZING: 0
INSOMNIA: 0
TREMORS: 0
HYPOTENSION: 0
PARALYSIS: 0
DECREASED CONCENTRATION: 0
EXERCISE INTOLERANCE: 0
LEG SWELLING: 0
NAUSEA: 0
MEMORY LOSS: 0
MUSCLE WEAKNESS: 0
HEMOPTYSIS: 0
CONSTIPATION: 0
DYSPNEA ON EXERTION: 0
SKIN CHANGES: 0
HEMATURIA: 0
ALTERED TEMPERATURE REGULATION: 0
SLEEP DISTURBANCES DUE TO BREATHING: 0
SMELL DISTURBANCE: 0
ABDOMINAL PAIN: 0
NERVOUS/ANXIOUS: 0
JOINT SWELLING: 0
HYPERTENSION: 0
DEPRESSION: 0
FLANK PAIN: 0
WEAKNESS: 0
NAIL CHANGES: 0
FEVER: 0
TASTE DISTURBANCE: 0
RESPIRATORY PAIN: 0
BLOOD IN STOOL: 0
DISTURBANCES IN COORDINATION: 0
SWOLLEN GLANDS: 0
NECK PAIN: 0
SEIZURES: 0
LEG PAIN: 0
PANIC: 0
POLYPHAGIA: 0
CHILLS: 0
NUMBNESS: 0
HEADACHES: 0
RECTAL PAIN: 0
POOR WOUND HEALING: 0
VOMITING: 0
DIFFICULTY URINATING: 0
EYE PAIN: 0
DOUBLE VISION: 0
POSTURAL DYSPNEA: 0
HOARSE VOICE: 0
EYE IRRITATION: 0
BOWEL INCONTINENCE: 0
SINUS CONGESTION: 0
MUSCLE CRAMPS: 0
ORTHOPNEA: 0
STIFFNESS: 0
DIARRHEA: 1
WEIGHT GAIN: 0
TINGLING: 0
SORE THROAT: 0
BRUISES/BLEEDS EASILY: 0
DECREASED LIBIDO: 0

## 2017-10-27 ASSESSMENT — PAIN SCALES - GENERAL: PAINLEVEL: NO PAIN (0)

## 2017-10-27 NOTE — LETTER
"10/27/2017     RE: Kelley Serra  [address] [facility] [id number]     Dear Colleague,    Thank you for referring your patient, Kelley Serra, to the [facility]. Please see a copy of my visit note below.    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 She completed treatment on 10/2/15 with an exploratory laparotomy, RSO, and fertility sparing staging. She presents today for a 3 month surveillance visit.    HPI: Oscar comes to the clinic feeling fairly well. She is concerned because she has been having R sided pelvic pain prior to her periods monthly which she describes as \"crampy.\" Denies discomfort on her left side. She also reports having diarrhea every one to two weeks. States she is eating healthy. This happened about seven years ago and resolved with abstaining from dairy. Denies more stress than normal, is doing yoga and trying to relax more. Continues to have night sweats that soak the bed- her PCP checked FSH and LH which were normal. Denies HIV, hx of TB, alcohol abuse, heroin use, acid reflux, or use of medications that could cause hot flashes including Tylenol and aspirin. No fevers or hot flashes during the day. Reports normal periods every 28-30 days, LMP three weeks ago. Reports anxiety. She is sexually active without concern. Up to date on visits with her PCP. Denies unintended weight loss, fatigue, weakness, changes in vision or hearing, shortness of breath, cough, chest pain, dyspepsia, nausea, vomiting, constipation, diarrhea, bloating, dysuria, urinary frequency or urgency, hematuria, pelvic pain, lower back pain, abnormal vaginal bleeding, vaginal discharge, numbness or tingling, or swelling of the extremities.       Brief Oncology History:  16: " Abdominal pain, 30cm pelvic mass.  73.  10/2/15: Sent to OR for ex lap, RSO, and fertility sparing staging. 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 Elected observation at this time.  1/25/16:  13  8/1/16:  9, Pap NIL  10/31/16:  10  1/30/17:  13  4/24/17:  10  8/1/17:  10  10/27/17:  pending    Past Medical History:   Diagnosis Date     Allergic rhinitis due to animal dander      Allergy to mold spores     2/26/14 skin tests pos. for:  dog(+)/DM/M only and only on intradermals--ie, minimally allergic     Asthma, mild intermittent     last inhaler usage >6 years ago (4/11)     Diagnostic skin and sensitization tests 2/26/14 skin tests pos. 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    treated with RSO       Past Surgical History:   Procedure Laterality Date     COLONOSCOPY N/A 12/2/2015    Procedure: COLONOSCOPY;  Surgeon: Aaron Garrison MD;  Location:  GI     ESOPHAGOSCOPY, GASTROSCOPY, DUODENOSCOPY (EGD), COMBINED N/A 11/23/2015    Procedure: COMBINED ESOPHAGOSCOPY, GASTROSCOPY, DUODENOSCOPY (EGD), BIOPSY SINGLE OR MULTIPLE;  Surgeon: Babs Cassidy MD;  Location:  GI     LAPAROTOMY EXPLORATORY  10/2/15    Right salpingo-oophorectomy, omentectomy, appendectomy, lymph node biopsies, cancer staging      LAPAROTOMY, STAGING, COMBINED N/A 10/2/2015    Procedure: COMBINED LAPAROTOMY, STAGING;  Surgeon: Duyen Thayer MD;  Location:  OR       Social History     Social History     Marital status:      Spouse name: N/A     Number of children: N/A     Years of education: N/A     Occupational History     Not on file.     Social History Main Topics     Smoking status: Never Smoker     Smokeless tobacco: Never Used     Alcohol use No     Drug use: No     Sexual  activity: Yes     Partners: Male     Birth control/ protection: Condom     Other Topics Concern     Parent/Sibling W/ Cabg, Mi Or Angioplasty Before 65f 55m? No     Social History Narrative       Family History   Problem Relation Age of Onset     DIABETES Mother      gestational     Thyroid Disease Mother      Blood Disease Mother      anemia     Cancer - colorectal Mother 54     CANCER Maternal Grandmother      Blood Disease Maternal Grandmother      cervical     Respiratory Maternal Grandmother      emphysema     DIABETES Maternal Grandfather      CEREBROVASCULAR DISEASE Maternal Grandfather      CANCER Maternal Grandfather      kidney     Breast Cancer Paternal Grandmother      DIABETES Paternal Grandfather      HEART DISEASE Father 56     had angioplasty       Current Outpatient Prescriptions   Medication     FLUoxetine (PROZAC) 10 MG capsule     ALPRAZolam (XANAX) 0.25 MG tablet     MULTIPLE VITAMINS PO     No current facility-administered medications for this visit.      Facility-Administered Medications Ordered in Other Visits   Medication     ketorolac (TORADOL) injection          Allergies   Allergen Reactions     Cleocin Rash     Keflex [Cephalexin Monohydrate] Rash     Zoloft        Review of Systems     Constitutional:  Positive for fatigue and night sweats. Negative for fever, chills, weight loss, weight gain, decreased appetite, recent stressors, height gain, height loss, post-operative complications, incisional pain, hallucinations, increased energy, hyperactivity and confused.   HENT:  Negative for ear pain, hearing loss, tinnitus, nosebleeds, trouble swallowing, hoarse voice, mouth sores, sore throat, ear discharge, tooth pain, gum tenderness, taste disturbance, smell disturbance, hearing aid, bleeding gums, dry mouth, sinus pain, sinus congestion and neck mass.    Eyes:  Negative for double vision, pain, redness, eye pain, decreased vision, eye watering, eye bulging, eye dryness, flashing lights,  spots, floaters, strabismus, tunnel vision, jaundice and eye irritation.   Respiratory:   Positive for cough and cough disturbing sleep. Negative for hemoptysis, sputum production, shortness of breath, wheezing, sleep disturbances due to breathing, snores loudly, respiratory pain, dyspnea on exertion and postural dyspnea.    Cardiovascular:  Negative for chest pain, dyspnea on exertion, palpitations, orthopnea, claudication, leg swelling, fingers/toes turn blue, hypertension, hypotension, syncope, history of heart murmur, chest pain on exertion, chest pain at rest, pacemaker, few scattered varicosities, leg pain, sleep disturbances due to breathing, tachycardia, light-headedness, exercise intolerance and edema.   Gastrointestinal:  Positive for diarrhea. Negative for heartburn, nausea, vomiting, abdominal pain, constipation, blood in stool, melena, rectal pain, bloating, hemorrhoids, bowel incontinence, jaundice, rectal bleeding, coffee ground emesis and change in stool.   Genitourinary:  Negative for bladder incontinence, dysuria, urgency, hematuria, flank pain, vaginal discharge, difficulty urinating, genital sores, dyspareunia, decreased libido, nocturia, voiding less frequently, arousal difficulty, abnormal vaginal bleeding, excessive menstruation, menstrual changes, hot flashes, vaginal dryness and postmenopausal bleeding.   Musculoskeletal:  Negative for myalgias, back pain, joint swelling, arthralgias, stiffness, muscle cramps, neck pain, bone pain, muscle weakness and fracture.   Skin:  Negative for nail changes, itching, poor wound healing, rash, hair changes, skin changes, acne, warts, poor wound healing, scarring, flaky skin, Raynaud's phenomenon, sensitivity to sunlight and skin thickening.   Neurological:  Negative for dizziness, tingling, tremors, speech change, seizures, loss of consciousness, weakness, light-headedness, numbness, headaches, disturbances in coordination, memory loss, difficulty  "walking and paralysis.   Endo/Heme:  Negative for anemia, swollen glands and bruises/bleeds easily.   Psychiatric/Behavioral:  Negative for depression, hallucinations, memory loss, decreased concentration, mood swings and panic attacks.    Breast:  Negative for breast discharge, breast mass, breast pain and nipple retraction.   Endocrine:  Negative for altered temperature regulation, polyphagia, polydipsia, unwanted hair growth and change in facial hair.        OBJECTIVE:    Physical Exam:    /77 (BP Location: Right arm, Patient Position: Sitting, Cuff Size: Adult Regular)  Pulse 95  Temp 98.1  F (36.7  C) (Oral)  Resp 16  Ht 1.6 m (5' 3\")  Wt 52.8 kg (116 lb 8 oz)  SpO2 99%  Breastfeeding? No  BMI 20.64 kg/m2    CONSTITUTIONAL: Alert non-toxic appearing female in no acute distress  HEAD: Normocephalic, atraumatic  EYES: PERRLA; no scleral icterus  ENT: Oropharynx pink without lesions  NECK: Neck supple without lymphadenopathy  RESPIRATORY: Lungs clear to auscultation, no increased work of breathing noted  CV: Regular rate and rhythm, S1S2, no clicks, murmurs, rubs, or gallops; bilateral lower extremities without edema, dorsalis pedis pulses 2+ bilaterally  GASTROINTESTINAL: Normoactive bowel sounds x4 quadrants, abdomen soft, non-distended, and non-tender to palpation without masses or organomegaly  GENITOURINARY: External genitalia and urethral meatus pink without lesions, masses, or excoriation. Vagina pink and moist without masses or lesions. Cervix without masses or lesions, os without bleeding, small amount of physiologic discharge noted. Bimanual exam reveals no masses, fullness, or cervical motion tenderness. Rectovaginal exam confirms these findings.  LYMPHATIC: Cervical, supraclavicular, and inguinal nodes without lymphadenopathy  MUSCULOSKELETAL: Moves all extremities, no obvious muscle wasting  NEUROLOGIC: No gross deficits, normal gait  SKIN: Appropriate color for race, warm and dry, no " rashes or lesions to unclothed skin  PSYCHIATRIC: Pleasant and interactive, affect bright, makes appropriate eye contact, thought process linear    Data:   pending    Assessment/Plan:  1) History of borderline mucinous tumor of the right ovary: Unclear etiology of RLQ pain- potentially due to adhesions, however, will obtain pelvic ultrasound with transvaginal to evaluate for recurrence. Should imaging and  be normal, will transition to surveillance every six months x3 years (through 10/2020) then annually thereafter with  and pelvic/rectal exam. Reviewed signs and symptoms  of recurrence including but not limited to bleeding from vagina, bladder, or rectum, bloating, early satiety, swelling in the lower extremities, abdominal or lower back pain, changes in bowel or bladder patterns, shortness of breath, increased fatigue, unexplained weight loss, and night sweats. Reviewed signs and symptoms for when she should contact the clinic or seek additional care. Patient to contact the clinic with any questions or concerns in the interim.  2) Night sweats: Unclear etiology. Obtaining US and  to assess for recurrence. If negative, will refer back to PCP for further evaluation.   3) Genetic testing: Risk factors have been assessed and the patient does not meet qualifications for genetic counseling based on NCCN guidelines.   4) Labs:  and pelvic ultrasound  5) Health maintenance issues discussed today include to follow up with PCP for co-morbid conditions and non-gynecologic issues. Pap due in 2019.  6) Patient verbalized understanding of and agreement with plan.    A total of 20 minutes of face to face time spent with patient, over 50% of which was spent in counseling and coordination of care.    CLAUDIA Chandler, FNP-C  Family Nurse Practitioner  Gynecologic Oncology  [facility]  Pager: [phone]

## 2017-10-27 NOTE — NURSING NOTE
Chief Complaint   Patient presents with     Blood Draw     labs drawn by vpt by rn.  vs taken.     Labs drawn by vpt by rn.  Vital signs taken.  Pt checked in to next appointment.  Rosio Thrasher RN

## 2017-10-27 NOTE — NURSING NOTE
"Oncology Rooming Note    October 27, 2017 10:52 AM   Kelley Serra is a 27 year old female who presents for:    Chief Complaint   Patient presents with     Blood Draw     labs drawn by vpt by rn.  vs taken.     Oncology Clinic Visit     return patient visit for 3 month follow up related to Mucinous cystadenoma, borderline malignancy     Initial Vitals: /77 (BP Location: Right arm, Patient Position: Sitting, Cuff Size: Adult Regular)  Pulse 95  Temp 98.1  F (36.7  C) (Oral)  Resp 16  Ht 1.6 m (5' 3\")  Wt 52.8 kg (116 lb 8 oz)  SpO2 99%  Breastfeeding? No  BMI 20.64 kg/m2 Estimated body mass index is 20.64 kg/(m^2) as calculated from the following:    Height as of this encounter: 1.6 m (5' 3\").    Weight as of this encounter: 52.8 kg (116 lb 8 oz). Body surface area is 1.53 meters squared.  No Pain (0) Comment: Data Unavailable   No LMP recorded.  Allergies reviewed: Yes  Medications reviewed: Yes    Medications: Medication refills not needed today.  Pharmacy name entered into [facility]: [id number] - [place] [address] AT [facility]    Clinical concerns: right side cramping catodell was notified.    6 minutes for nursing intake (face to face time)     Francisca Washington CMA              "

## 2017-10-27 NOTE — MR AVS SNAPSHOT
After Visit Summary   10/27/2017    Kelley Serra    MRN: [id number]           Patient Information     Date Of Birth          1990        Visit Information        Provider Department      10/27/2017 11:00 AM Myra Gifford APRN CNP [facility]        Today's Diagnoses     RLQ abdominal pain    -  1    Encounter for follow-up surveillance of ovarian cancer           Follow-ups after your visit        Follow-up notes from your care team     Return in about 6 months (around 4/27/2018), or if symptoms worsen or fail to improve, for surveillance with Myra.      Your next 10 appointments already scheduled     Oct 28, 2017 10:15 AM CDT   US PELVIC COMPLETE W TRANSVAGINAL with BEUS1   [facility] ([facility]    [address] [facility] [address] [phone]           Please bring a list of your medicines (including vitamins, minerals and over-the-counter drugs). Also, tell your doctor about any allergies you may have. Wear comfortable clothes and leave your valuables at home.  Adults: Drink six 8-ounce glasses of fluid one hour before your exam. Do NOT empty your bladder.  If you need to empty your bladder before your exam, try to release only a little bit of urine. Then, drink another 8oz glass of fluid.  Children: Children who are potty trained should drink at least 4 cups (32 oz) of liquid 45 minutes to one hour prior to the exam. The child s bladder must be full in order to achieve a diagnostic exam. If your child is very uncomfortable or has an urgent need to pee, please notify a technologist; they will try to find out how much longer the wait may be and provide instructions to help relieve the pressure. Occasionally it is medically necessary to insert a urinary catheter to fill the bladder.  Please call the Imaging Department at your exam site with any questions.            Apr 27, 2018 10:30 AM CDT   BBOXX Lab Draw with FARIBA  [facility] LAB DRAW   [facility] ([facility])    [address] [facility] [place] [address] [phone]            Apr 27, 2018 11:00 AM CDT   (Arrive by 10:45 AM)   Return Visit with CLAUDIA Chandler CNP   [facility] ([facility])    [address] [address] [phone]              Future tests that were ordered for you today     Open Future Orders        Priority Expected Expires Ordered    US Pelvic Complete with Transvaginal Routine  10/27/2018 10/27/2017            Who to contact     If you have questions or need follow up information about today's clinic visit or your schedule please contact [facility] directly at [phone].  Normal or non-critical lab and imaging results will be communicated to you by MyChart, letter or phone within 4 business days after the clinic has received the results. If you do not hear from us within 7 days, please contact the clinic through [employer] or phone. If you have a critical or abnormal lab result, we will notify you by phone as soon as possible.  Submit refill requests through [facility] or call your pharmacy and they will forward the refill request to us. Please allow 3 business days for your refill to be completed.          Additional Information About Your Visit        MyChart Information     [facility] gives you secure access to your electronic health record. If you see a primary care provider, you can also send messages to your care team and make appointments. If you have questions, please call your primary care clinic.  If you do not have a primary care provider, please call [phone] and they will assist you.        Care EveryWhere ID     This is your Care EveryWhere ID. This could be used by other organizations to access your [place] medical records  [id number]        Your Vitals Were     Pulse Temperature  "Respirations Height Pulse Oximetry Breastfeeding?    95 98.1  F (36.7  C) (Oral) 16 1.6 m (5' 3\") 99% No    BMI (Body Mass Index)                   20.64 kg/m2            Blood Pressure from Last 3 Encounters:   10/27/17 111/77   08/01/17 102/61   04/27/17 112/70    Weight from Last 3 Encounters:   10/27/17 52.8 kg (116 lb 8 oz)   08/01/17 52.8 kg (116 lb 6.4 oz)   04/27/17 52.8 kg (116 lb 6.4 oz)              We Performed the Following             Primary Care Provider Office Phone # Fax #    CLAUDIA [facility] [phone] [address] [place] [address]        Equal Access to Services     [facility]: Tsering Ramon, waaxda luqadaha, qaybta kaalmada maxine, wing magallanes . So [facility] [phone].    ATENCIÓN: Si habla español, tiene a mccauley disposición servicios gratuitos de asistencia lingüística. [facility] [phone].    We comply with applicable federal civil rights laws and [place] laws. We do not discriminate on the basis of race, color, national origin, age, disability, sex, sexual orientation, or gender identity.            Thank you!     Thank you for choosing [facility]  for your care. Our goal is always to provide you with excellent care. Hearing back from our patients is one way we can continue to improve our services. Please take a few minutes to complete the written survey that you may receive in the mail after your visit with us. Thank you!             Your Updated Medication List - Protect others around you: Learn how to safely use, store and throw away your medicines at www.disposemymeds.org.          This list is accurate as of: 10/27/17 11:46 AM.  Always use your most recent med list.                   Brand Name Dispense Instructions for use Diagnosis    ALPRAZolam 0.25 MG tablet    XANAX    40 tablet    Take 1 tablet (0.25 mg) by mouth 2 times daily as needed for anxiety    JOHN PAUL " (generalized anxiety disorder)       FLUoxetine 10 MG capsule    PROzac    30 capsule    TAKE 1 CAPSULE(10 MG) BY MOUTH DAILY    JOHN PAUL (generalized anxiety disorder)       MULTIPLE VITAMINS PO      take  by mouth.

## 2017-10-27 NOTE — PROGRESS NOTES
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 She completed treatment on 10/2/15 with an exploratory laparotomy, RSO, and fertility sparing staging. She presents today for a 3 month surveillance visit.    HPI: Oscar comes to the clinic feeling fairly well. She is concerned because she has been having R sided pelvic pain prior to her periods monthly which she describes as \"crampy.\" Denies discomfort on her left side. She also reports having diarrhea every one to two weeks. States she is eating healthy. This happened about seven years ago and resolved with abstaining from dairy. Denies more stress than normal, is doing yoga and trying to relax more. Continues to have night sweats that soak the bed- her PCP checked FSH and LH which were normal. Denies HIV, hx of TB, alcohol abuse, heroin use, acid reflux, or use of medications that could cause hot flashes including Tylenol and aspirin. No fevers or hot flashes during the day. Reports normal periods every 28-30 days, LMP three weeks ago. Reports anxiety. She is sexually active without concern. Up to date on visits with her PCP. Denies unintended weight loss, fatigue, weakness, changes in vision or hearing, shortness of breath, cough, chest pain, dyspepsia, nausea, vomiting, constipation, diarrhea, bloating, dysuria, urinary frequency or urgency, hematuria, pelvic pain, lower back pain, abnormal vaginal bleeding, vaginal discharge, numbness or tingling, or swelling of the extremities.       Brief Oncology History:  16: Abdominal pain, 30cm pelvic mass.  73.  10/2/15: Sent to OR for ex lap, RSO, and fertility sparing staging. 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 Elected observation at this time.  16:  13  16:  " 9, Pap NIL  10/31/16:  10  1/30/17:  13  4/24/17:  10  8/1/17:  10  10/27/17:  pending    Past Medical History:   Diagnosis Date     Allergic rhinitis due to animal dander      Allergy to mold spores     2/26/14 skin tests pos. for:  dog(+)/DM/M only and only on intradermals--ie, minimally allergic     Asthma, mild intermittent     last inhaler usage >6 years ago (4/11)     Diagnostic skin and sensitization tests 2/26/14 skin tests pos. 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    treated with RSO       Past Surgical History:   Procedure Laterality Date     COLONOSCOPY N/A 12/2/2015    Procedure: COLONOSCOPY;  Surgeon: Aaron Garrison MD;  Location:  GI     ESOPHAGOSCOPY, GASTROSCOPY, DUODENOSCOPY (EGD), COMBINED N/A 11/23/2015    Procedure: COMBINED ESOPHAGOSCOPY, GASTROSCOPY, DUODENOSCOPY (EGD), BIOPSY SINGLE OR MULTIPLE;  Surgeon: Babs Cassidy MD;  Location:  GI     LAPAROTOMY EXPLORATORY  10/2/15    Right salpingo-oophorectomy, omentectomy, appendectomy, lymph node biopsies, cancer staging      LAPAROTOMY, STAGING, COMBINED N/A 10/2/2015    Procedure: COMBINED LAPAROTOMY, STAGING;  Surgeon: Duyen Thayer MD;  Location:  OR       Social History     Social History     Marital status:      Spouse name: N/A     Number of children: N/A     Years of education: N/A     Occupational History     Not on file.     Social History Main Topics     Smoking status: Never Smoker     Smokeless tobacco: Never Used     Alcohol use No     Drug use: No     Sexual activity: Yes     Partners: Male     Birth control/ protection: Condom     Other Topics Concern     Parent/Sibling W/ Cabg, Mi Or Angioplasty Before 65f 55m? No     Social History Narrative       Family History   Problem Relation Age of Onset     DIABETES  Mother      gestational     Thyroid Disease Mother      Blood Disease Mother      anemia     Cancer - colorectal Mother 54     CANCER Maternal Grandmother      Blood Disease Maternal Grandmother      cervical     Respiratory Maternal Grandmother      emphysema     DIABETES Maternal Grandfather      CEREBROVASCULAR DISEASE Maternal Grandfather      CANCER Maternal Grandfather      kidney     Breast Cancer Paternal Grandmother      DIABETES Paternal Grandfather      HEART DISEASE Father 56     had angioplasty       Current Outpatient Prescriptions   Medication     FLUoxetine (PROZAC) 10 MG capsule     ALPRAZolam (XANAX) 0.25 MG tablet     MULTIPLE VITAMINS PO     No current facility-administered medications for this visit.      Facility-Administered Medications Ordered in Other Visits   Medication     ketorolac (TORADOL) injection          Allergies   Allergen Reactions     Cleocin Rash     Keflex [Cephalexin Monohydrate] Rash     Zoloft        Review of Systems     Constitutional:  Positive for fatigue and night sweats. Negative for fever, chills, weight loss, weight gain, decreased appetite, recent stressors, height gain, height loss, post-operative complications, incisional pain, hallucinations, increased energy, hyperactivity and confused.   HENT:  Negative for ear pain, hearing loss, tinnitus, nosebleeds, trouble swallowing, hoarse voice, mouth sores, sore throat, ear discharge, tooth pain, gum tenderness, taste disturbance, smell disturbance, hearing aid, bleeding gums, dry mouth, sinus pain, sinus congestion and neck mass.    Eyes:  Negative for double vision, pain, redness, eye pain, decreased vision, eye watering, eye bulging, eye dryness, flashing lights, spots, floaters, strabismus, tunnel vision, jaundice and eye irritation.   Respiratory:   Positive for cough and cough disturbing sleep. Negative for hemoptysis, sputum production, shortness of breath, wheezing, sleep disturbances due to breathing, snores  loudly, respiratory pain, dyspnea on exertion and postural dyspnea.    Cardiovascular:  Negative for chest pain, dyspnea on exertion, palpitations, orthopnea, claudication, leg swelling, fingers/toes turn blue, hypertension, hypotension, syncope, history of heart murmur, chest pain on exertion, chest pain at rest, pacemaker, few scattered varicosities, leg pain, sleep disturbances due to breathing, tachycardia, light-headedness, exercise intolerance and edema.   Gastrointestinal:  Positive for diarrhea. Negative for heartburn, nausea, vomiting, abdominal pain, constipation, blood in stool, melena, rectal pain, bloating, hemorrhoids, bowel incontinence, jaundice, rectal bleeding, coffee ground emesis and change in stool.   Genitourinary:  Negative for bladder incontinence, dysuria, urgency, hematuria, flank pain, vaginal discharge, difficulty urinating, genital sores, dyspareunia, decreased libido, nocturia, voiding less frequently, arousal difficulty, abnormal vaginal bleeding, excessive menstruation, menstrual changes, hot flashes, vaginal dryness and postmenopausal bleeding.   Musculoskeletal:  Negative for myalgias, back pain, joint swelling, arthralgias, stiffness, muscle cramps, neck pain, bone pain, muscle weakness and fracture.   Skin:  Negative for nail changes, itching, poor wound healing, rash, hair changes, skin changes, acne, warts, poor wound healing, scarring, flaky skin, Raynaud's phenomenon, sensitivity to sunlight and skin thickening.   Neurological:  Negative for dizziness, tingling, tremors, speech change, seizures, loss of consciousness, weakness, light-headedness, numbness, headaches, disturbances in coordination, memory loss, difficulty walking and paralysis.   Endo/Heme:  Negative for anemia, swollen glands and bruises/bleeds easily.   Psychiatric/Behavioral:  Negative for depression, hallucinations, memory loss, decreased concentration, mood swings and panic attacks.    Breast:  Negative for  "breast discharge, breast mass, breast pain and nipple retraction.   Endocrine:  Negative for altered temperature regulation, polyphagia, polydipsia, unwanted hair growth and change in facial hair.        OBJECTIVE:    Physical Exam:    /77 (BP Location: Right arm, Patient Position: Sitting, Cuff Size: Adult Regular)  Pulse 95  Temp 98.1  F (36.7  C) (Oral)  Resp 16  Ht 1.6 m (5' 3\")  Wt 52.8 kg (116 lb 8 oz)  SpO2 99%  Breastfeeding? No  BMI 20.64 kg/m2    CONSTITUTIONAL: Alert non-toxic appearing female in no acute distress  HEAD: Normocephalic, atraumatic  EYES: PERRLA; no scleral icterus  ENT: Oropharynx pink without lesions  NECK: Neck supple without lymphadenopathy  RESPIRATORY: Lungs clear to auscultation, no increased work of breathing noted  CV: Regular rate and rhythm, S1S2, no clicks, murmurs, rubs, or gallops; bilateral lower extremities without edema, dorsalis pedis pulses 2+ bilaterally  GASTROINTESTINAL: Normoactive bowel sounds x4 quadrants, abdomen soft, non-distended, and non-tender to palpation without masses or organomegaly  GENITOURINARY: External genitalia and urethral meatus pink without lesions, masses, or excoriation. Vagina pink and moist without masses or lesions. Cervix without masses or lesions, os without bleeding, small amount of physiologic discharge noted. Bimanual exam reveals no masses, fullness, or cervical motion tenderness. Rectovaginal exam confirms these findings.  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 Should imaging and  be normal, will transition to surveillance every six months x3 years (through 10/2020) then annually thereafter with  and pelvic/rectal exam. Reviewed signs and symptoms  of recurrence including but not limited to bleeding from vagina, bladder, or rectum, bloating, early satiety, swelling in the lower extremities, abdominal or lower back pain, changes in bowel or bladder patterns, shortness of breath, increased fatigue, unexplained weight loss, and night sweats. Reviewed signs and symptoms for when she should contact the clinic or seek additional care. Patient to contact the clinic with any questions or concerns in the interim.  2) Night sweats: Unclear etiology. Obtaining US and  to assess for recurrence. If negative, will refer back to PCP for further evaluation.   3) Genetic testing: Risk factors have been assessed and the patient does not meet qualifications for genetic counseling based on NCCN guidelines.   4) Labs:  and pelvic ultrasound  5) Health maintenance issues discussed today include to follow up with PCP for co-morbid conditions and non-gynecologic issues. Pap due in 2019.  6) Patient verbalized understanding of and agreement with plan.    A total of 20 minutes of face to face time spent with patient, over 50% of which was spent in counseling and coordination of care.    CLAUDIA Chandler, FNP-C  Family Nurse Practitioner  Gynecologic Oncology  [facility]  Pager: [phone]

## 2017-10-27 NOTE — Clinical Note
In room. Please help her schedule a pelvic ultrasound at a  clinic closer to her home. See Myra in six months with lab.

## 2017-10-28 ENCOUNTER — RADIANT APPOINTMENT (OUTPATIENT)
Dept: ULTRASOUND IMAGING | Facility: CLINIC | Age: 27
End: 2017-10-28
Attending: NURSE PRACTITIONER
Payer: COMMERCIAL

## 2017-10-28 DIAGNOSIS — R10.31 RLQ ABDOMINAL PAIN: ICD-10-CM

## 2017-10-28 PROCEDURE — 76856 US EXAM PELVIC COMPLETE: CPT

## 2017-10-28 PROCEDURE — 76830 TRANSVAGINAL US NON-OB: CPT

## 2017-11-02 ENCOUNTER — MYC MEDICAL ADVICE (OUTPATIENT)
Dept: FAMILY MEDICINE | Facility: CLINIC | Age: 27
End: 2017-11-02

## 2018-01-19 DIAGNOSIS — R10.2 PELVIC PAIN IN FEMALE: Primary | ICD-10-CM

## 2018-01-23 DIAGNOSIS — F41.1 GAD (GENERALIZED ANXIETY DISORDER): ICD-10-CM

## 2018-01-24 NOTE — TELEPHONE ENCOUNTER
"Requested Prescriptions   Pending Prescriptions Disp Refills     FLUoxetine (PROZAC) 10 MG capsule [Pharmacy Med Name: FLUOXETINE 10MG CAPSULES] 30 capsule 0    Last Written Prescription Date:  7/27/17  Last Fill Quantity: 30,  # refills: 5   Last Office Visit with G, P or [facility] prescribing provider:  4/27/17   Future Office Visit:      Sig: TAKE 1 CAPSULE(10 MG) BY MOUTH DAILY    SSRIs Protocol Failed    1/23/2018  4:10 AM       Failed - PHQ-9 score less than 5 in past 6 months    The PHQ-9 criteria is meant to fail. It requires a PHQ-9 score review         Failed - Recent (6 mo) or future visit with authorizing provider's specialty    Patient had office visit in the last 6 months or has a visit in the next 30 days with authorizing provider.  See \"Patient Info\" tab in inbasket, or \"Choose Columns\" in Meds & Orders section of the refill encounter.           Passed - Recent or future visit with authorizing provider    Patient had office visit in the last year or has a visit in the next 30 days with authorizing provider.  See \"Patient Info\" tab in inbasket, or \"Choose Columns\" in Meds & Orders section of the refill encounter.            Passed - Patient is age 18 or older       Passed - No active pregnancy on record       Passed - No positive pregnancy test in last 12 months          "

## 2018-01-25 RX ORDER — FLUOXETINE 10 MG/1
CAPSULE ORAL
Qty: 15 CAPSULE | Refills: 0 | Status: SHIPPED | OUTPATIENT
Start: 2018-01-25 | End: 2018-01-30 | Stop reason: DRUGHIGH

## 2018-01-25 NOTE — TELEPHONE ENCOUNTER
Medication is being filled for 1 time refill only due to: 2 wks opnly need OV  Over due for office visit and/or labs   SIMEON Hayward  RN/Hermes Tai

## 2018-01-30 ENCOUNTER — OFFICE VISIT (OUTPATIENT)
Dept: FAMILY MEDICINE | Facility: CLINIC | Age: 28
End: 2018-01-30
Payer: COMMERCIAL

## 2018-01-30 VITALS
BODY MASS INDEX: 20.48 KG/M2 | WEIGHT: 115.6 LBS | DIASTOLIC BLOOD PRESSURE: 68 MMHG | HEART RATE: 80 BPM | SYSTOLIC BLOOD PRESSURE: 104 MMHG | TEMPERATURE: 98.4 F | HEIGHT: 63 IN

## 2018-01-30 DIAGNOSIS — E55.9 VITAMIN D DEFICIENCY: ICD-10-CM

## 2018-01-30 DIAGNOSIS — K21.9 GASTROESOPHAGEAL REFLUX DISEASE WITHOUT ESOPHAGITIS: ICD-10-CM

## 2018-01-30 DIAGNOSIS — R61 NIGHT SWEATS: ICD-10-CM

## 2018-01-30 DIAGNOSIS — Z83.49 FAMILY HISTORY OF THYROID DISEASE IN MOTHER: ICD-10-CM

## 2018-01-30 DIAGNOSIS — F41.1 GAD (GENERALIZED ANXIETY DISORDER): Primary | ICD-10-CM

## 2018-01-30 PROCEDURE — 84443 ASSAY THYROID STIM HORMONE: CPT | Performed by: NURSE PRACTITIONER

## 2018-01-30 PROCEDURE — 82306 VITAMIN D 25 HYDROXY: CPT | Performed by: NURSE PRACTITIONER

## 2018-01-30 PROCEDURE — 99214 OFFICE O/P EST MOD 30 MIN: CPT | Performed by: NURSE PRACTITIONER

## 2018-01-30 PROCEDURE — 36415 COLL VENOUS BLD VENIPUNCTURE: CPT | Performed by: NURSE PRACTITIONER

## 2018-01-30 RX ORDER — FLUOXETINE 10 MG/1
CAPSULE ORAL
Qty: 15 CAPSULE | Refills: 0 | Status: CANCELLED | OUTPATIENT
Start: 2018-01-30

## 2018-01-30 ASSESSMENT — ANXIETY QUESTIONNAIRES
GAD7 TOTAL SCORE: 6
2. NOT BEING ABLE TO STOP OR CONTROL WORRYING: NOT AT ALL
1. FEELING NERVOUS, ANXIOUS, OR ON EDGE: MORE THAN HALF THE DAYS
5. BEING SO RESTLESS THAT IT IS HARD TO SIT STILL: NOT AT ALL
6. BECOMING EASILY ANNOYED OR IRRITABLE: NOT AT ALL
3. WORRYING TOO MUCH ABOUT DIFFERENT THINGS: MORE THAN HALF THE DAYS
7. FEELING AFRAID AS IF SOMETHING AWFUL MIGHT HAPPEN: NOT AT ALL

## 2018-01-30 ASSESSMENT — PATIENT HEALTH QUESTIONNAIRE - PHQ9: 5. POOR APPETITE OR OVEREATING: MORE THAN HALF THE DAYS

## 2018-01-30 NOTE — MR AVS SNAPSHOT
After Visit Summary   1/30/2018    Kelley Serra    MRN: [id number]           Patient Information     Date Of Birth          1990        Visit Information        Provider Department      1/30/2018 4:20 PM Tamica Durán APRN [facility]        Today's Diagnoses     JOHN PAUL (generalized anxiety disorder)    -  1    Gastroesophageal reflux disease without esophagitis        Vitamin D deficiency        Night sweats        Family history of thyroid disease in mother          Care Instructions     Please get an over the counter Vitamin D supplement of 2000 units. Take 2000- 4000 units during the fall/winter months and 1000 units during the spring/summer    For the  Reflux.  avoid the causative foods.     Try taking an over the counter  Omeprazole  30-60 min before the first meal of the day    Tomato and  Tomato based products   Garlic, onion, spicy foods.   Caffeine, chocolate,   Alcohol  Smoking     Your nose does look like you have some allergies,   No bunny in the bedroom .   For the allergies get started on a non-sedating anti-histamine such as Claritin, Allegra or Zyrtec, ( get the generic - it is a lot cheaper)  Stay well hydrated - urine should be clear.       For the anxiety   I did increase the Prozac to 20 mg - I did give you 1 month so let me know how the increase is working     Get dressed in something other than your sweats while you are working.  Set time limits on your work.     Find a yoga place close to home and TAKE THE TIME TO TAKE CARE OF YOURSELF    I will let you know what your thyroid level is and if you should start medication or no need                       Follow-ups after your visit        Your next 10 appointments already scheduled     Apr 27, 2018 10:30 AM MATTHEW   [facility]  [facility]   [facility] ([facility])    [address]  [address]  [facility] [address] [phone]  "           Apr 27, 2018 11:00 AM CDT   (Arrive by 10:45 AM)   Return Visit with CLAUDIA Chandler [facility] ([facility])    [address]  [address] [place] [address] [phone]              Who to contact     Normal or non-critical lab and imaging results will be communicated to you by [email or web address], letter or phone within 4 business days after the clinic has received the results. If you do not hear from us within 7 days, please contact the clinic through [email or web address] or phone. If you have a critical or abnormal lab result, we will notify you by phone as soon as possible.  Submit refill requests through [email or web address] or call your pharmacy and they will forward the refill request to us. Please allow 3 business days for your refill to be completed.          If you need to speak with a  for additional information , please call: [phone]           Additional Information About Your Visit        [email or web address] Information     [email or web address] gives you secure access to your electronic health record. If you see a primary care provider, you can also send messages to your care team and make appointments. If you have questions, please call your primary care clinic.  If you do not have a primary care provider, please call [phone] and they will assist you.        Care EveryWhere ID     This is your Care EveryWhere ID. This could be used by other organizations to access your [facility] medical records  [id number]        Your Vitals Were     Pulse Temperature Height BMI (Body Mass Index)          80 98.4  F (36.9  C) (Tympanic) 5' 3\" (1.6 m) 20.48 kg/m2         Blood Pressure from Last 3 Encounters:   01/30/18 104/68   10/27/17 111/77   08/01/17 102/61    Weight from Last 3 Encounters:   01/30/18 115 lb 9.6 oz (52.4 kg)   10/27/17 116 lb 8 oz (52.8 kg)   08/01/17 116 lb 6.4 oz (52.8 kg)              We Performed the Following     TSH with free T4 reflex     " Vitamin D Deficiency          Today's Medication Changes          These changes are accurate as of 1/30/18  5:13 PM.  If you have any questions, ask your nurse or doctor.               These medicines have changed or have updated prescriptions.        Dose/Directions    FLUoxetine 20 MG capsule   Commonly known as:  PROzac   This may have changed:  See the new instructions.   Used for:  JOHN PAUL (generalized anxiety disorder)   Changed by:  Tamica Durán APRN CNP        Dose:  20 mg   Take 1 capsule (20 mg) by mouth daily   Quantity:  30 capsule   Refills:  1            Where to get your medicines      These medications were sent to [facility] [id number] - [address]  AT [facility] [address] [facility] [address]     Phone:  [phone]     FLUoxetine 20 MG capsule                Primary Care Provider Office Phone # Fax #    CLAUDIA Little [phone] [address] [place] [address]        Equal Access to Services     [facility] : Hadii aad ku hadasho Soomaali, waaxda luqadaha, qaybta kaalmada adeegyada, waxay idiin hayaan adeeg kharabob magallanes . [facility] [phone].    ATENCIÓN: Si habla español, tiene a mccauley disposición servicios gratuitos de asistencia lingüística. [facility] [phone].    We comply with applicable federal civil rights laws and [place] laws. We do not discriminate on the basis of race, color, national origin, age, disability, sex, sexual orientation, or gender identity.            Thank you!     Thank you for choosing [facility]  for your care. Our goal is always to provide you with excellent care. Hearing back from our patients is one way we can continue to improve our services. Please take a few minutes to complete the written survey that you may receive in the mail after your visit with us. Thank you!             Your Updated Medication List - Protect others around you:  Learn how to safely use, store and throw away your medicines at www.disposemymeds.org.          This list is accurate as of 1/30/18  5:13 PM.  Always use your most recent med list.                   Brand Name Dispense Instructions for use Diagnosis    ALPRAZolam 0.25 MG tablet    XANAX    40 tablet    Take 1 tablet (0.25 mg) by mouth 2 times daily as needed for anxiety    JOHN PAUL (generalized anxiety disorder)       FLUoxetine 20 MG capsule    PROzac    30 capsule    Take 1 capsule (20 mg) by mouth daily    JOHN PAUL (generalized anxiety disorder)       MULTIPLE VITAMINS PO      take  by mouth.

## 2018-01-30 NOTE — NURSING NOTE
"Chief Complaint   Patient presents with     Anxiety     Cough       Initial /68 (BP Location: Left arm, Patient Position: Chair, Cuff Size: Adult Regular)  Pulse 80  Temp 98.4  F (36.9  C) (Tympanic)  Ht 5' 3\" (1.6 m)  Wt 115 lb 9.6 oz (52.4 kg)  BMI 20.48 kg/m2 Estimated body mass index is 20.48 kg/(m^2) as calculated from the following:    Height as of this encounter: 5' 3\" (1.6 m).    Weight as of this encounter: 115 lb 9.6 oz (52.4 kg).  Medication Reconciliation: complete     Aliyah Huang CMA (AAMA)      "

## 2018-01-30 NOTE — PROGRESS NOTES
SUBJECTIVE:   Kelley Serra is a 27 year old female who presents to clinic today for the following health issues:     *Would like thyroid rechecked today. Her TSH was elevated back in 2/2015, did have a normal TSH after that in 07/2015.  Mother did have issues with thyroid about 15 years ago, states that it seemed to have gotten better on its own.     Cough - For about the past 6 months has had a dry cough that will wake her up around 2-3 in the morning. Denies ever being a smoker. Will take a sip of water until the tickle in her throat goes away. Does have history of childhood asthma.     Anxiety Follow-Up    Status since last visit: Does not notice a huge a change since starting    Other associated symptoms:racing heart, worrying thoughts  Significant life event: No   Current substance abuse: None  Depression symptoms: No  JOHN PAUL-7 SCORE 1/14/2016 7/10/2017   Total Score - 5 (mild anxiety)   Total Score 7 5       JOHN PAUL-7    Amount of exercise or physical activity: 2-3 days/week for an average of 30-45 minutes    Problems taking medications regularly: No    Medication side effects: none    Diet: dairy free            Problem list and histories reviewed & adjusted, as indicated.  Additional history: as documented    Patient Active Problem List   Diagnosis     Esophageal reflux     Benign neoplasm of skin     CARDIOVASCULAR SCREENING; LDL GOAL LESS THAN 160     Lactose intolerance     Allergy to mold spores     House dust mite allergy     Allergic rhinitis due to animal dander     Diagnostic skin and sensitization tests     Family history of colon cancer     Mucinous cystadenoma, borderline malignancy     Vitamin D deficiency     JOHN PAUL (generalized anxiety disorder)     Family history of thyroid disease in mother     Past Surgical History:   Procedure Laterality Date     APPENDECTOMY  10/2/2015     BIOPSY  10/2/2015     COLONOSCOPY N/A 12/2/2015    Procedure: COLONOSCOPY;  Surgeon: Aaron Garrison MD;   Location:  GI     ESOPHAGOSCOPY, GASTROSCOPY, DUODENOSCOPY (EGD), COMBINED N/A 2015    Procedure: COMBINED ESOPHAGOSCOPY, GASTROSCOPY, DUODENOSCOPY (EGD), BIOPSY SINGLE OR MULTIPLE;  Surgeon: Babs Cassidy MD;  Location:  GI     GYN SURGERY  10/2/2015     LAPAROTOMY EXPLORATORY  10/2/15    Right salpingo-oophorectomy, omentectomy, appendectomy, lymph node biopsies, cancer staging      LAPAROTOMY, STAGING, COMBINED N/A 10/2/2015    Procedure: COMBINED LAPAROTOMY, STAGING;  Surgeon: Duyen Thayer MD;  Location:  OR       Social History   Substance Use Topics     Smoking status: Never Smoker     Smokeless tobacco: Never Used     Alcohol use No     Family History   Problem Relation Age of Onset     DIABETES Mother      gestational     Thyroid Disease Mother      Blood Disease Mother      anemia     Cancer - colorectal Mother 54     Colon Cancer Mother      Doing well     CANCER Maternal Grandmother      Blood Disease Maternal Grandmother      cervical     Respiratory Maternal Grandmother      emphysema     DIABETES Maternal Grandfather           CEREBROVASCULAR DISEASE Maternal Grandfather      CANCER Maternal Grandfather      kidney     Breast Cancer Paternal Grandmother           DIABETES Paternal Grandfather           CEREBROVASCULAR DISEASE Paternal Grandfather      HEART DISEASE Father 56     had angioplasty     Coronary Artery Disease Father      Had angioplasty in 2014         Current Outpatient Prescriptions   Medication Sig Dispense Refill     FLUoxetine (PROZAC) 20 MG capsule Take 1 capsule (20 mg) by mouth daily 30 capsule 1     MULTIPLE VITAMINS PO take  by mouth.       [DISCONTINUED] FLUoxetine (PROZAC) 10 MG capsule TAKE 1 CAPSULE(10 MG) BY MOUTH DAILY 15 capsule 0     ALPRAZolam (XANAX) 0.25 MG tablet Take 1 tablet (0.25 mg) by mouth 2 times daily as needed for anxiety (Patient not taking: Reported on 2018) 40 tablet 0     Allergies  "  Allergen Reactions     Cleocin Rash     Keflex [Cephalexin Monohydrate] Rash     Zoloft      BP Readings from Last 3 Encounters:   01/30/18 104/68   10/27/17 111/77   08/01/17 102/61    Wt Readings from Last 3 Encounters:   01/30/18 115 lb 9.6 oz (52.4 kg)   10/27/17 116 lb 8 oz (52.8 kg)   08/01/17 116 lb 6.4 oz (52.8 kg)                    Reviewed and updated as needed this visit by clinical staff       Reviewed and updated as needed this visit by Provider         ROS:  C: NEGATIVE for fever, chills, change in weight  ENT/MOUTH: NEGATIVE for ear, mouth and throat problems  R: NEGATIVE for SOB  POSITIVE for  Cough  2-3 hours after she lays down     CV: NEGATIVE for chest pain, palpitations or peripheral edema  GI: NEGATIVE for nausea, abdominal pain, heartburn, or change in bowel habits and POSITIVE for abdominal pain epigastric and heartburn or reflux  PSYCHIATRIC: POSITIVE foranxiety, depressed mood and fatigue    OBJECTIVE:     /68 (BP Location: Left arm, Patient Position: Chair, Cuff Size: Adult Regular)  Pulse 80  Temp 98.4  F (36.9  C) (Tympanic)  Ht 5' 3\" (1.6 m)  Wt 115 lb 9.6 oz (52.4 kg)  BMI 20.48 kg/m2  Body mass index is 20.48 kg/(m^2).   GENERAL: healthy, alert and no distress  HENT: ear canals and TM's normal, nose and mouth without ulcers or lesions  NECK: no adenopathy, no asymmetry, masses, or scars and thyroid normal to palpation  RESP: lungs clear to auscultation - no rales, rhonchi or wheezes  CV: regular rate and rhythm, normal S1 S2, no S3 or S4, no murmur, click or rub, no peripheral edema and peripheral pulses strong  ABDOMEN: tenderness epigastric and bowel sounds normal  PSYCH: mentation appears normal, anxious, judgement and insight intact, appearance well groomed and quiet     Diagnostic Test Results:  Labs are pending     ASSESSMENT/PLAN:     ASSESSMENT/PLAN:      ICD-10-CM    1. JOHN PAUL (generalized anxiety disorder) F41.1 FLUoxetine (PROZAC) 20 MG capsule   2. " Gastroesophageal reflux disease without esophagitis K21.9    3. Vitamin D deficiency E55.9 Vitamin D Deficiency   4. Night sweats R61 TSH with free T4 reflex   5. Family history of thyroid disease in mother Z83.49 TSH with free T4 reflex       Patient Instructions    Please get an over the counter Vitamin D supplement of 2000 units. Take 2000- 4000 units during the fall/winter months and 1000 units during the spring/summer    For the  Reflux.  avoid the causative foods.     Try taking an over the counter  Omeprazole  30-60 min before the first meal of the day    Tomato and  Tomato based products   Garlic, onion, spicy foods.   Caffeine, chocolate,   Alcohol  Smoking     Your nose does look like you have some allergies,   No bunny in the bedroom .   For the allergies get started on a non-sedating anti-histamine such as Claritin, Allegra or Zyrtec, ( get the generic - it is a lot cheaper)  Stay well hydrated - urine should be clear.       For the anxiety   I did increase the Prozac to 20 mg - I did give you 1 month so let me know how the increase is working     Get dressed in something other than your sweats while you are working.  Set time limits on your work.     Find a yoga place close to home and TAKE THE TIME TO TAKE CARE OF YOURSELF    I will let you know what your thyroid level is and if you should start medication or no need         MEDICATIONS:        - Increase Prozac  to 20 mg  Vitamin D 4000 u        - Continue other medications without change  See Patient Instructions    MACIE BARRERA NP, APRN [facility]

## 2018-01-30 NOTE — PATIENT INSTRUCTIONS
Please get an over the counter Vitamin D supplement of 2000 units. Take 2000- 4000 units during the fall/winter months and 1000 units during the spring/summer    For the  Reflux.  avoid the causative foods.     Try taking an over the counter  Omeprazole  30-60 min before the first meal of the day    Tomato and  Tomato based products   Garlic, onion, spicy foods.   Caffeine, chocolate,   Alcohol  Smoking     Your nose does look like you have some allergies,   No bunny in the bedroom .   For the allergies get started on a non-sedating anti-histamine such as Claritin, Allegra or Zyrtec, ( get the generic - it is a lot cheaper)  Stay well hydrated - urine should be clear.       For the anxiety   I did increase the Prozac to 20 mg - I did give you 1 month so let me know how the increase is working     Get dressed in something other than your sweats while you are working.  Set time limits on your work.     Find a yoga place close to home and TAKE THE TIME TO TAKE CARE OF YOURSELF    I will let you know what your thyroid level is and if you should start medication or no need

## 2018-01-31 LAB
DEPRECATED CALCIDIOL+CALCIFEROL SERPL-MC: 12 UG/L (ref 20–75)
TSH SERPL DL<=0.005 MIU/L-ACNC: 2.85 MU/L (ref 0.4–4)

## 2018-01-31 ASSESSMENT — ANXIETY QUESTIONNAIRES: GAD7 TOTAL SCORE: 6

## 2018-01-31 ASSESSMENT — PATIENT HEALTH QUESTIONNAIRE - PHQ9: SUM OF ALL RESPONSES TO PHQ QUESTIONS 1-9: 7

## 2018-02-05 ENCOUNTER — TELEPHONE (OUTPATIENT)
Dept: FAMILY MEDICINE | Facility: CLINIC | Age: 28
End: 2018-02-05

## 2018-02-05 DIAGNOSIS — E55.9 VITAMIN D DEFICIENCY: Primary | ICD-10-CM

## 2018-02-13 ENCOUNTER — TRANSFERRED RECORDS (OUTPATIENT)
Dept: HEALTH INFORMATION MANAGEMENT | Facility: CLINIC | Age: 28
End: 2018-02-13

## 2018-03-26 ENCOUNTER — TRANSFERRED RECORDS (OUTPATIENT)
Dept: HEALTH INFORMATION MANAGEMENT | Facility: CLINIC | Age: 28
End: 2018-03-26

## 2018-04-26 ASSESSMENT — ENCOUNTER SYMPTOMS
VOMITING: 0
TASTE DISTURBANCE: 0
DIARRHEA: 0
HYPERTENSION: 0
ARTHRALGIAS: 0
PANIC: 0
HEMOPTYSIS: 0
JOINT SWELLING: 0
HYPOTENSION: 0
NAIL CHANGES: 0
DECREASED CONCENTRATION: 0
NERVOUS/ANXIOUS: 0
HEADACHES: 0
MUSCLE CRAMPS: 0
SEIZURES: 0
DISTURBANCES IN COORDINATION: 0
SNORES LOUDLY: 0
PARALYSIS: 0
CLAUDICATION: 0
INCREASED ENERGY: 0
DYSURIA: 0
RESPIRATORY PAIN: 0
SPEECH CHANGE: 0
HOT FLASHES: 0
NUMBNESS: 0
ORTHOPNEA: 0
MUSCLE WEAKNESS: 0
STIFFNESS: 0
EYE PAIN: 0
WEIGHT GAIN: 0
MEMORY LOSS: 0
JAUNDICE: 0
RECTAL PAIN: 0
DECREASED LIBIDO: 0
SMELL DISTURBANCE: 0
BACK PAIN: 0
LEG PAIN: 0
SKIN CHANGES: 0
RECTAL BLEEDING: 0
ABDOMINAL PAIN: 0
NECK MASS: 0
POLYDIPSIA: 0
LEG SWELLING: 0
TROUBLE SWALLOWING: 0
COUGH: 0
DECREASED APPETITE: 0
DYSPNEA ON EXERTION: 0
COUGH DISTURBING SLEEP: 0
INSOMNIA: 0
SYNCOPE: 0
SPUTUM PRODUCTION: 0
ALTERED TEMPERATURE REGULATION: 0
DEPRESSION: 0
BOWEL INCONTINENCE: 0
PALPITATIONS: 0
POLYPHAGIA: 0
BRUISES/BLEEDS EASILY: 0
TREMORS: 0
FEVER: 0
BLOATING: 0
DOUBLE VISION: 0
EYE WATERING: 0
DIZZINESS: 0
BREAST PAIN: 0
LOSS OF CONSCIOUSNESS: 0
POOR WOUND HEALING: 0
HALLUCINATIONS: 0
HEARTBURN: 0
POSTURAL DYSPNEA: 0
DIFFICULTY URINATING: 0
FATIGUE: 1
WEIGHT LOSS: 0
EXERCISE INTOLERANCE: 0
TACHYCARDIA: 0
BLOOD IN STOOL: 0
SINUS PAIN: 0
CHILLS: 0
HEMATURIA: 0
NAUSEA: 0
EYE IRRITATION: 0
TINGLING: 0
NECK PAIN: 0
EYE REDNESS: 0
HOARSE VOICE: 0
BREAST MASS: 0
CONSTIPATION: 0
NIGHT SWEATS: 1
WHEEZING: 0
SHORTNESS OF BREATH: 0
WEAKNESS: 0
SORE THROAT: 0
FLANK PAIN: 0
SINUS CONGESTION: 0
SWOLLEN GLANDS: 0
MYALGIAS: 0
LIGHT-HEADEDNESS: 0
SLEEP DISTURBANCES DUE TO BREATHING: 0

## 2018-04-26 NOTE — PROGRESS NOTES
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 She completed treatment on 10/2/15 with an exploratory laparotomy, RSO, and fertility sparing staging. She presents today for a six month surveillance visit.    HPI: Oscar comes to the clinic feeling well. Has been doing pelvic floor physical therapy and yoga for chronic pelvic pain prior to her periods- has greatly helped. Periods are regular, LMP started today. Sexually active, no dyspareunia- using condoms for pregnancy prevention, no current plans for pregnancy. Continues with night sweats at times, worse the week prior to her menses. She is sexually active without concern. Up to date on visits with her PCP. Denies unintended weight loss, fatigue, weakness, changes in vision or hearing, shortness of breath, cough, chest pain, dyspepsia, nausea, vomiting, constipation, diarrhea, bloating, dysuria, urinary frequency or urgency, hematuria, pelvic pain, lower back pain, abnormal vaginal bleeding, vaginal discharge, numbness or tingling, or swelling of the extremities.       Brief Oncology History:  9/29/15: Abdominal pain, 30cm pelvic mass.  73.  10/2/15: Sent to OR for ex lap, RSO, and fertility sparing staging. 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 Elected observation at this time.  16:  13  16:  9, Pap NIL  10/31/16:  10  17:  13  17:  10  17:  10  10/27/17:  10  10/28/17: Pelvic ultrasound:  IMPRESSION:   1. Normal sonographic evaluation of the uterus and left ovary.  2. Trace free fluid in the pelvis which may be physiologic.   18:  pending    Past Medical History:   Diagnosis Date     Allergic rhinitis due to animal dander      Allergy to mold spores      2/26/14 skin tests pos. for:  dog(+)/DM/M only and only on intradermals--ie, minimally allergic     Asthma, mild intermittent     last inhaler usage >6 years ago (4/11)     Cancer (H) 9/2015    Stage 1A ovarian cancer     Diagnostic skin and sensitization tests 2/26/14 skin tests pos. 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    treated with RSO       Past Surgical History:   Procedure Laterality Date     APPENDECTOMY  10/2/2015     BIOPSY  10/2/2015     COLONOSCOPY N/A 12/2/2015    Procedure: COLONOSCOPY;  Surgeon: Aaron Garrison MD;  Location:  GI     ESOPHAGOSCOPY, GASTROSCOPY, DUODENOSCOPY (EGD), COMBINED N/A 11/23/2015    Procedure: COMBINED ESOPHAGOSCOPY, GASTROSCOPY, DUODENOSCOPY (EGD), BIOPSY SINGLE OR MULTIPLE;  Surgeon: Babs Cassidy MD;  Location:  GI     GYN SURGERY  10/2/2015     LAPAROTOMY EXPLORATORY  10/2/15    Right salpingo-oophorectomy, omentectomy, appendectomy, lymph node biopsies, cancer staging      LAPAROTOMY, STAGING, COMBINED N/A 10/2/2015    Procedure: COMBINED LAPAROTOMY, STAGING;  Surgeon: Duyen Thayer MD;  Location:  OR       Social History     Social History     Marital status:      Spouse name: N/A     Number of children: N/A     Years of education: N/A     Occupational History     Not on file.     Social History Main Topics     Smoking status: Never Smoker     Smokeless tobacco: Never Used     Alcohol use No     Drug use: No     Sexual activity: Yes     Partners: Male     Birth control/ protection: Condom     Other Topics Concern     Parent/Sibling W/ Cabg, Mi Or Angioplasty Before 65f 55m? No     Social History Narrative       Family History   Problem Relation Age of Onset     DIABETES Mother      gestational     Thyroid Disease Mother      Blood Disease Mother      anemia     Cancer -  colorectal Mother 54     Colon Cancer Mother      Doing well     CANCER Maternal Grandmother      Blood Disease Maternal Grandmother      cervical     Respiratory Maternal Grandmother      emphysema     DIABETES Maternal Grandfather           CEREBROVASCULAR DISEASE Maternal Grandfather      CANCER Maternal Grandfather      kidney     Breast Cancer Paternal Grandmother           DIABETES Paternal Grandfather           CEREBROVASCULAR DISEASE Paternal Grandfather      HEART DISEASE Father 56     had angioplasty     Coronary Artery Disease Father      Had angioplasty in 2014       Current Outpatient Prescriptions   Medication     ALPRAZolam (XANAX) 0.25 MG tablet     FLUoxetine (PROZAC) 20 MG capsule     MULTIPLE VITAMINS PO     No current facility-administered medications for this visit.      Facility-Administered Medications Ordered in Other Visits   Medication     ketorolac (TORADOL) injection          Allergies   Allergen Reactions     Cleocin Rash     Keflex [Cephalexin Monohydrate] Rash     Zoloft        Review of Systems     Constitutional:  Positive for fatigue and night sweats. Negative for fever, chills, weight loss, weight gain, decreased appetite, recent stressors, height gain, height loss, post-operative complications, incisional pain, hallucinations, increased energy, hyperactivity and confused.   HENT:  Negative for ear pain, hearing loss, tinnitus, nosebleeds, trouble swallowing, hoarse voice, mouth sores, sore throat, ear discharge, tooth pain, gum tenderness, taste disturbance, smell disturbance, hearing aid, bleeding gums, dry mouth, sinus pain, sinus congestion and neck mass.    Eyes:  Negative for double vision, pain, redness, eye pain, decreased vision, eye watering, eye bulging, eye dryness, flashing lights, spots, floaters, strabismus, tunnel vision, jaundice and eye irritation.   Respiratory:   Negative for cough, hemoptysis, sputum production, shortness of breath,  wheezing, sleep disturbances due to breathing, snores loudly, respiratory pain, dyspnea on exertion, cough disturbing sleep and postural dyspnea.    Cardiovascular:  Negative for chest pain, dyspnea on exertion, palpitations, orthopnea, claudication, leg swelling, fingers/toes turn blue, hypertension, hypotension, syncope, history of heart murmur, chest pain on exertion, chest pain at rest, pacemaker, few scattered varicosities, leg pain, sleep disturbances due to breathing, tachycardia, light-headedness, exercise intolerance and edema.   Gastrointestinal:  Negative for heartburn, nausea, vomiting, abdominal pain, diarrhea, constipation, blood in stool, melena, rectal pain, bloating, hemorrhoids, bowel incontinence, jaundice, rectal bleeding, coffee ground emesis and change in stool.   Genitourinary:  Negative for bladder incontinence, dysuria, urgency, hematuria, flank pain, vaginal discharge, difficulty urinating, genital sores, dyspareunia, decreased libido, nocturia, voiding less frequently, arousal difficulty, abnormal vaginal bleeding, excessive menstruation, menstrual changes, hot flashes, vaginal dryness and postmenopausal bleeding.   Musculoskeletal:  Negative for myalgias, back pain, joint swelling, arthralgias, stiffness, muscle cramps, neck pain, bone pain, muscle weakness and fracture.   Skin:  Negative for nail changes, itching, poor wound healing, rash, hair changes, skin changes, acne, warts, poor wound healing, scarring, flaky skin, Raynaud's phenomenon, sensitivity to sunlight and skin thickening.   Neurological:  Negative for dizziness, tingling, tremors, speech change, seizures, loss of consciousness, weakness, light-headedness, numbness, headaches, disturbances in coordination, memory loss, difficulty walking and paralysis.   Endo/Heme:  Negative for anemia, swollen glands and bruises/bleeds easily.   Psychiatric/Behavioral:  Negative for depression, hallucinations, memory loss, decreased  concentration, mood swings and panic attacks.    Breast:  Negative for breast discharge, breast mass, breast pain and nipple retraction.   Endocrine:  Negative for altered temperature regulation, polyphagia, polydipsia, unwanted hair growth and change in facial hair.        OBJECTIVE:    Physical Exam:    /66  Pulse 73  Temp 98.7  F (37.1  C) (Oral)  Resp 16  Wt 53.3 kg (117 lb 8 oz)  SpO2 100%  BMI 20.81 kg/m2    CONSTITUTIONAL: Alert non-toxic appearing female in no acute distress  HEAD: Normocephalic, atraumatic  EYES: PERRLA; no scleral icterus  ENT: Oropharynx pink without lesions  NECK: Neck supple without lymphadenopathy  RESPIRATORY: Lungs clear to auscultation, no increased work of breathing noted  CV: Regular rate and rhythm, S1S2, no clicks, murmurs, rubs, or gallops; bilateral lower extremities without edema, dorsalis pedis pulses 2+ bilaterally  GASTROINTESTINAL: Normoactive bowel sounds x4 quadrants, abdomen soft, non-distended, and non-tender to palpation without masses or organomegaly  GENITOURINARY: External genitalia and urethral meatus pink without lesions, masses, or excoriation. Vagina pink and moist without masses or lesions. Cervix without masses or lesions, small to moderate amount bleeding in vaginal vault. Bimanual exam reveals no masses, fullness, or cervical motion tenderness. Rectovaginal exam confirms these findings.  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 Await . Return in six  months for surveillance with pelvic ultrasound per updated recommendations per Solomon et al. (2017). Reviewed signs and symptoms  of recurrence including but not limited to bleeding from vagina, bladder, or rectum, bloating, early satiety, swelling in the lower extremities, abdominal or lower back pain, changes in bowel or bladder patterns, shortness of breath, increased fatigue, unexplained weight loss, and night sweats. Reviewed signs and symptoms for when she should contact the clinic or seek additional care. Patient to contact the clinic with any questions or concerns in the interim. Continue pelvic PT for chronic pelvic pain as this has been helpful.  2) Genetic testing: Risk factors have been assessed and the patient does not meet qualifications for genetic counseling based on NCCN guidelines.   3) Labs:  and pelvic ultrasound  4) Health maintenance issues discussed today include to follow up with PCP for co-morbid conditions and non-gynecologic issues. Pap due in 2019. Reviewed conservative management for night sweats.  5) Patient verbalized understanding of and agreement with plan.    A total of 20 minutes of face to face time spent with patient, over 50% of which was spent in counseling and coordination of care.    CLAUDIA Chandler, FNP-C  Gynecologic Oncology  [facility]  Pager: [phone]

## 2018-04-27 ENCOUNTER — ONCOLOGY VISIT (OUTPATIENT)
Dept: ONCOLOGY | Facility: CLINIC | Age: 28
End: 2018-04-27
Attending: NURSE PRACTITIONER
Payer: COMMERCIAL

## 2018-04-27 VITALS
WEIGHT: 117.5 LBS | HEART RATE: 73 BPM | BODY MASS INDEX: 20.81 KG/M2 | DIASTOLIC BLOOD PRESSURE: 66 MMHG | TEMPERATURE: 98.7 F | RESPIRATION RATE: 16 BRPM | OXYGEN SATURATION: 100 % | SYSTOLIC BLOOD PRESSURE: 103 MMHG

## 2018-04-27 DIAGNOSIS — Z85.43 ENCOUNTER FOR FOLLOW-UP SURVEILLANCE OF OVARIAN CANCER: ICD-10-CM

## 2018-04-27 DIAGNOSIS — D39.11 OVARIAN TUMOR OF BORDERLINE MALIGNANCY, RIGHT: Primary | ICD-10-CM

## 2018-04-27 DIAGNOSIS — Z08 ENCOUNTER FOR FOLLOW-UP SURVEILLANCE OF OVARIAN CANCER: ICD-10-CM

## 2018-04-27 LAB — CANCER AG125 SERPL-ACNC: 9 U/ML (ref 0–30)

## 2018-04-27 PROCEDURE — 36415 COLL VENOUS BLD VENIPUNCTURE: CPT

## 2018-04-27 PROCEDURE — 86304 IMMUNOASSAY TUMOR CA 125: CPT | Performed by: NURSE PRACTITIONER

## 2018-04-27 PROCEDURE — 99213 OFFICE O/P EST LOW 20 MIN: CPT | Mod: ZP | Performed by: NURSE PRACTITIONER

## 2018-04-27 PROCEDURE — G0463 HOSPITAL OUTPT CLINIC VISIT: HCPCS | Mod: ZF

## 2018-04-27 ASSESSMENT — PAIN SCALES - GENERAL: PAINLEVEL: NO PAIN (0)

## 2018-04-27 NOTE — LETTER
2018       RE: Kelley Serra  [address] [facility] [address]     Dear Colleague,    Thank you for referring your patient, Kelley Serra, to the [facility]. Please see a copy of my visit note below.    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 She completed treatment on 10/2/15 with an exploratory laparotomy, RSO, and fertility sparing staging. She presents today for a six month surveillance visit.    HPI: Oscar comes to the clinic feeling well. Has been doing pelvic floor physical therapy and yoga for chronic pelvic pain prior to her periods- has greatly helped. Periods are regular, LMP started today. Sexually active, no dyspareunia- using condoms for pregnancy prevention, no current plans for pregnancy. Continues with night sweats at times, worse the week prior to her menses. She is sexually active without concern. Up to date on visits with her PCP. Denies unintended weight loss, fatigue, weakness, changes in vision or hearing, shortness of breath, cough, chest pain, dyspepsia, nausea, vomiting, constipation, diarrhea, bloating, dysuria, urinary frequency or urgency, hematuria, pelvic pain, lower back pain, abnormal vaginal bleeding, vaginal discharge, numbness or tingling, or swelling of the extremities.       Brief Oncology History:  9/29/15: Abdominal pain, 30cm pelvic mass.  73.  10/2/15: Sent to OR for ex lap, RSO, and fertility sparing staging. 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 Elected observation at this time.  16:  13  16:  9, Pap NIL  10/31/16:  10  17:  13  17:  10  17:  10  10/27/17:  10  10/28/17: Pelvic ultrasound:  IMPRESSION:   1. Normal sonographic  evaluation of the uterus and left ovary.  2. Trace free fluid in the pelvis which may be physiologic.   4/27/18:  pending    Past Medical History:   Diagnosis Date     Allergic rhinitis due to animal dander      Allergy to mold spores     2/26/14 skin tests pos. for:  dog(+)/DM/M only and only on intradermals--ie, minimally allergic     Asthma, mild intermittent     last inhaler usage >6 years ago (4/11)     Cancer (H) 9/2015    Stage 1A ovarian cancer     Diagnostic skin and sensitization tests 2/26/14 skin tests pos. 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    treated with RSO       Past Surgical History:   Procedure Laterality Date     APPENDECTOMY  10/2/2015     BIOPSY  10/2/2015     COLONOSCOPY N/A 12/2/2015    Procedure: COLONOSCOPY;  Surgeon: Aaron Garrison MD;  Location:  GI     ESOPHAGOSCOPY, GASTROSCOPY, DUODENOSCOPY (EGD), COMBINED N/A 11/23/2015    Procedure: COMBINED ESOPHAGOSCOPY, GASTROSCOPY, DUODENOSCOPY (EGD), BIOPSY SINGLE OR MULTIPLE;  Surgeon: Babs Cassidy MD;  Location:  GI     GYN SURGERY  10/2/2015     LAPAROTOMY EXPLORATORY  10/2/15    Right salpingo-oophorectomy, omentectomy, appendectomy, lymph node biopsies, cancer staging      LAPAROTOMY, STAGING, COMBINED N/A 10/2/2015    Procedure: COMBINED LAPAROTOMY, STAGING;  Surgeon: Duyen Thayer MD;  Location:  OR       Social History     Social History     Marital status:      Spouse name: N/A     Number of children: N/A     Years of education: N/A     Occupational History     Not on file.     Social History Main Topics     Smoking status: Never Smoker     Smokeless tobacco: Never Used     Alcohol use No     Drug use: No     Sexual activity: Yes     Partners: Male     Birth control/ protection: Condom     Other Topics Concern     Parent/Sibling W/  Cabg, Mi Or Angioplasty Before 65f 55m? No     Social History Narrative       Family History   Problem Relation Age of Onset     DIABETES Mother      gestational     Thyroid Disease Mother      Blood Disease Mother      anemia     Cancer - colorectal Mother 54     Colon Cancer Mother      Doing well     CANCER Maternal Grandmother      Blood Disease Maternal Grandmother      cervical     Respiratory Maternal Grandmother      emphysema     DIABETES Maternal Grandfather           CEREBROVASCULAR DISEASE Maternal Grandfather      CANCER Maternal Grandfather      kidney     Breast Cancer Paternal Grandmother           DIABETES Paternal Grandfather           CEREBROVASCULAR DISEASE Paternal Grandfather      HEART DISEASE Father 56     had angioplasty     Coronary Artery Disease Father      Had angioplasty in 2014       Current Outpatient Prescriptions   Medication     ALPRAZolam (XANAX) 0.25 MG tablet     FLUoxetine (PROZAC) 20 MG capsule     MULTIPLE VITAMINS PO     No current facility-administered medications for this visit.      Facility-Administered Medications Ordered in Other Visits   Medication     ketorolac (TORADOL) injection          Allergies   Allergen Reactions     Cleocin Rash     Keflex [Cephalexin Monohydrate] Rash     Zoloft        Review of Systems     Constitutional:  Positive for fatigue and night sweats. Negative for fever, chills, weight loss, weight gain, decreased appetite, recent stressors, height gain, height loss, post-operative complications, incisional pain, hallucinations, increased energy, hyperactivity and confused.   HENT:  Negative for ear pain, hearing loss, tinnitus, nosebleeds, trouble swallowing, hoarse voice, mouth sores, sore throat, ear discharge, tooth pain, gum tenderness, taste disturbance, smell disturbance, hearing aid, bleeding gums, dry mouth, sinus pain, sinus congestion and neck mass.    Eyes:  Negative for double vision, pain, redness, eye  pain, decreased vision, eye watering, eye bulging, eye dryness, flashing lights, spots, floaters, strabismus, tunnel vision, jaundice and eye irritation.   Respiratory:   Negative for cough, hemoptysis, sputum production, shortness of breath, wheezing, sleep disturbances due to breathing, snores loudly, respiratory pain, dyspnea on exertion, cough disturbing sleep and postural dyspnea.    Cardiovascular:  Negative for chest pain, dyspnea on exertion, palpitations, orthopnea, claudication, leg swelling, fingers/toes turn blue, hypertension, hypotension, syncope, history of heart murmur, chest pain on exertion, chest pain at rest, pacemaker, few scattered varicosities, leg pain, sleep disturbances due to breathing, tachycardia, light-headedness, exercise intolerance and edema.   Gastrointestinal:  Negative for heartburn, nausea, vomiting, abdominal pain, diarrhea, constipation, blood in stool, melena, rectal pain, bloating, hemorrhoids, bowel incontinence, jaundice, rectal bleeding, coffee ground emesis and change in stool.   Genitourinary:  Negative for bladder incontinence, dysuria, urgency, hematuria, flank pain, vaginal discharge, difficulty urinating, genital sores, dyspareunia, decreased libido, nocturia, voiding less frequently, arousal difficulty, abnormal vaginal bleeding, excessive menstruation, menstrual changes, hot flashes, vaginal dryness and postmenopausal bleeding.   Musculoskeletal:  Negative for myalgias, back pain, joint swelling, arthralgias, stiffness, muscle cramps, neck pain, bone pain, muscle weakness and fracture.   Skin:  Negative for nail changes, itching, poor wound healing, rash, hair changes, skin changes, acne, warts, poor wound healing, scarring, flaky skin, Raynaud's phenomenon, sensitivity to sunlight and skin thickening.   Neurological:  Negative for dizziness, tingling, tremors, speech change, seizures, loss of consciousness, weakness, light-headedness, numbness, headaches,  disturbances in coordination, memory loss, difficulty walking and paralysis.   Endo/Heme:  Negative for anemia, swollen glands and bruises/bleeds easily.   Psychiatric/Behavioral:  Negative for depression, hallucinations, memory loss, decreased concentration, mood swings and panic attacks.    Breast:  Negative for breast discharge, breast mass, breast pain and nipple retraction.   Endocrine:  Negative for altered temperature regulation, polyphagia, polydipsia, unwanted hair growth and change in facial hair.        OBJECTIVE:    Physical Exam:    /66  Pulse 73  Temp 98.7  F (37.1  C) (Oral)  Resp 16  Wt 53.3 kg (117 lb 8 oz)  SpO2 100%  BMI 20.81 kg/m2    CONSTITUTIONAL: Alert non-toxic appearing female in no acute distress  HEAD: Normocephalic, atraumatic  EYES: PERRLA; no scleral icterus  ENT: Oropharynx pink without lesions  NECK: Neck supple without lymphadenopathy  RESPIRATORY: Lungs clear to auscultation, no increased work of breathing noted  CV: Regular rate and rhythm, S1S2, no clicks, murmurs, rubs, or gallops; bilateral lower extremities without edema, dorsalis pedis pulses 2+ bilaterally  GASTROINTESTINAL: Normoactive bowel sounds x4 quadrants, abdomen soft, non-distended, and non-tender to palpation without masses or organomegaly  GENITOURINARY: External genitalia and urethral meatus pink without lesions, masses, or excoriation. Vagina pink and moist without masses or lesions. Cervix without masses or lesions, small to moderate amount bleeding in vaginal vault. Bimanual exam reveals no masses, fullness, or cervical motion tenderness. Rectovaginal exam confirms these findings.  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 Await . Return in six months for surveillance with pelvic ultrasound per updated recommendations per Solomon et al. (2017). Reviewed signs and symptoms  of recurrence including but not limited to bleeding from vagina, bladder, or rectum, bloating, early satiety, swelling in the lower extremities, abdominal or lower back pain, changes in bowel or bladder patterns, shortness of breath, increased fatigue, unexplained weight loss, and night sweats. Reviewed signs and symptoms for when she should contact the clinic or seek additional care. Patient to contact the clinic with any questions or concerns in the interim. Continue pelvic PT for chronic pelvic pain as this has been helpful.  2) Genetic testing: Risk factors have been assessed and the patient does not meet qualifications for genetic counseling based on NCCN guidelines.   3) Labs:  and pelvic ultrasound  4) Health maintenance issues discussed today include to follow up with PCP for co-morbid conditions and non-gynecologic issues. Pap due in 2019. Reviewed conservative management for night sweats.  5) Patient verbalized understanding of and agreement with plan.    A total of 20 minutes of face to face time spent with patient, over 50% of which was spent in counseling and coordination of care.    CLAUDIA Chandler, FNP-C  Gynecologic Oncology  [facility]  Pager: [phone]

## 2018-04-27 NOTE — NURSING NOTE
"Oncology Rooming Note    April 27, 2018 10:56 AM   Kelley Serra is a 27 year old female who presents for:    Chief Complaint   Patient presents with     Blood Draw     vpt blood draw and vitals     Oncology Clinic Visit     Return for surveillance of ovarian cancer      Initial Vitals: /66  Pulse 73  Temp 98.7  F (37.1  C) (Oral)  Resp 16  Wt 53.3 kg (117 lb 8 oz)  SpO2 100%  BMI 20.81 kg/m2 Estimated body mass index is 20.81 kg/(m^2) as calculated from the following:    Height as of 1/30/18: 1.6 m (5' 3\").    Weight as of this encounter: 53.3 kg (117 lb 8 oz). Body surface area is 1.54 meters squared.  No Pain (0) Comment: Data Unavailable   No LMP recorded.  Allergies reviewed: Yes  Medications reviewed: Yes    Medications: Medication refills not needed today.  Pharmacy name entered into [facility]: [facility] [address] [place] [address] [address] AT [facility]    Clinical concerns: Results  Myra was notified.    6  minutes for nursing intake (face to face time)     Kellee Mai MA              "

## 2018-04-27 NOTE — MR AVS SNAPSHOT
After Visit Summary   4/27/2018    Kelley Serra    MRN: [id number]           Patient Information     Date Of Birth          1990        Visit Information        Provider Department      4/27/2018 11:00 AM Myra Gifford APRN CNP [facility]        Today's Diagnoses     Ovarian tumor of borderline malignancy, right    -  1       Follow-ups after your visit        Follow-up notes from your care team     Return in about 6 months (around 10/27/2018), or if symptoms worsen or fail to improve, for surveillance with Myra.      Your next 10 appointments already scheduled     Oct 19, 2018  2:30 PM CDT   US PELVIC COMPLETE W TRANSVAGINAL with BEUS1   [facility] ([facility])    [address] [address] [phone]           Please bring a list of your medicines (including vitamins, minerals and over-the-counter drugs). Also, tell your doctor about any allergies you may have. Wear comfortable clothes and leave your valuables at home.  Adults: Drink six 8-ounce glasses of fluid one hour before your exam. Do NOT empty your bladder.  If you need to empty your bladder before your exam, try to release only a little bit of urine. Then, drink another 8oz glass of fluid.  Children: Children who are potty trained should drink at least 4 cups (32 oz) of liquid 45 minutes to one hour prior to the exam. The child s bladder must be full in order to achieve a diagnostic exam. If your child is very uncomfortable or has an urgent need to pee, please notify a technologist; they will try to find out how much longer the wait may be and provide instructions to help relieve the pressure. Occasionally it is medically necessary to insert a urinary catheter to fill the bladder.  Please call the Imaging Department at your exam site with any questions.            Oct 26, 2018  1:30 PM CDT   [facility] Lab Draw with  [facility] LAB DRAW   [facility] ([facility]    [address]  [address]  [facility] [id number] [phone]            Oct 26, 2018  2:00 PM CDT   (Arrive by 1:45 PM)   Return Visit with CLAUDIA Chandler CNP   [facility] ([facility])    [address]  [address]  [facility] [address] [phone]              Future tests that were ordered for you today     Open Standing Orders        Priority Remaining Interval Expires Ordered     Routine 2/2 q6mo 4/26/2019 4/26/2018          Open Future Orders        Priority Expected Expires Ordered    US Pelvic Complete with Transvaginal Routine 10/24/2018 4/27/2019 4/27/2018            Who to contact     If you have questions or need follow up information about today's clinic visit or your schedule please contact [facility] directly at [phone].  Normal or non-critical lab and imaging results will be communicated to you by [employer], letter or phone within 4 business days after the clinic has received the results. If you do not hear from us within 7 days, please contact the clinic through [employer] or phone. If you have a critical or abnormal lab result, we will notify you by phone as soon as possible.  Submit refill requests through [employer] or call your pharmacy and they will forward the refill request to us. Please allow 3 business days for your refill to be completed.          Additional Information About Your Visit        [employer] Information     [employer] gives you secure access to your electronic health record. If you see a primary care provider, you can also send messages to your care team and make appointments. If you have questions, please call your primary care clinic.  If you do not have a primary care provider, please call [phone] and they will assist you.        Care EveryWhere ID     This is your Care EveryWhere ID. This could be used by other organizations to  access your [place] medical records  [id number]        Your Vitals Were     Pulse Temperature Respirations Pulse Oximetry BMI (Body Mass Index)       73 98.7  F (37.1  C) (Oral) 16 100% 20.81 kg/m2        Blood Pressure from Last 3 Encounters:   04/27/18 103/66   01/30/18 104/68   10/27/17 111/77    Weight from Last 3 Encounters:   04/27/18 53.3 kg (117 lb 8 oz)   01/30/18 52.4 kg (115 lb 9.6 oz)   10/27/17 52.8 kg (116 lb 8 oz)               Primary Care Provider Office Phone # Fax #    Tamica Nice Alec Durán, APRN [facility] [phone] [address] [place] [address]        Equal Access to Services     [facility]: Hadii aad ku hadasho Sothomas, waaxda luqadaha, qaybta kaalmada adeegyada, waxtonya vale haybeto magallanes . So [facility] [phone].    ATENCIÓN: Si habla español, tiene a mccauley disposición servicios gratuitos de asistencia lingüística. Waldemar al [phone].    We comply with applicable federal civil rights laws and [place] laws. We do not discriminate on the basis of race, color, national origin, age, disability, sex, sexual orientation, or gender identity.            Thank you!     Thank you for choosing [facility]  for your care. Our goal is always to provide you with excellent care. Hearing back from our patients is one way we can continue to improve our services. Please take a few minutes to complete the written survey that you may receive in the mail after your visit with us. Thank you!             Your Updated Medication List - Protect others around you: Learn how to safely use, store and throw away your medicines at www.disposemymeds.org.          This list is accurate as of 4/27/18 11:44 AM.  Always use your most recent med list.                   Brand Name Dispense Instructions for use Diagnosis    ALPRAZolam 0.25 MG tablet    XANAX    40 tablet    Take 1 tablet (0.25 mg) by mouth 2 times daily as needed for anxiety    JOHN PAUL (generalized  anxiety disorder)       FLUoxetine 20 MG capsule    PROzac    90 capsule    Take 1 capsule (20 mg) by mouth daily    JOHN PAUL (generalized anxiety disorder)       MULTIPLE VITAMINS PO      take  by mouth.

## 2018-04-27 NOTE — NURSING NOTE
Chief Complaint   Patient presents with     Blood Draw     vpt blood draw and vitals     Venipuncture labs drawn from right arm.

## 2018-07-26 ENCOUNTER — OFFICE VISIT (OUTPATIENT)
Dept: OBGYN | Facility: CLINIC | Age: 28
End: 2018-07-26
Payer: COMMERCIAL

## 2018-07-26 VITALS
HEART RATE: 112 BPM | BODY MASS INDEX: 20.9 KG/M2 | WEIGHT: 118 LBS | DIASTOLIC BLOOD PRESSURE: 69 MMHG | SYSTOLIC BLOOD PRESSURE: 106 MMHG | TEMPERATURE: 98.9 F

## 2018-07-26 DIAGNOSIS — R10.2 PELVIC PAIN IN FEMALE: ICD-10-CM

## 2018-07-26 DIAGNOSIS — N94.6 DYSMENORRHEA: Primary | ICD-10-CM

## 2018-07-26 PROCEDURE — 99203 OFFICE O/P NEW LOW 30 MIN: CPT | Performed by: OBSTETRICS & GYNECOLOGY

## 2018-07-26 NOTE — MR AVS SNAPSHOT
After Visit Summary   7/26/2018    Kelley Serra    MRN: [id number]           Patient Information     Date Of Birth          1990        Visit Information        Provider Department      7/26/2018 1:45 PM Agbeh, Cephas Mawuena, MD [facility]        Care Instructions                                                        If you have any questions regarding your visit, Please contact your care team.    [facility] Access Services: [phone]      [facility] CLINIC HOURS TELEPHONE NUMBER   Cephas Agbeh, M.D.    MOOKIE Bolton,     YAZ Cuevas RN             Monday-[place]    8:00a.m-4:45 p.m    Tuesday--[place]     8:00a.m-4:45 p.m.    Thursday-[place]    8:00a.m-4:45 p.m.    Friday-[place]uli[place]    8:00a.m-4:45 p.m    [facility]   [address] [place] [address]   [phone]-Ask for [facility]   Fax [phone]   Aujpqqh-[phone]     [facility]   [address] [place] [address] [place] [address]   [phone]     [facility]   [address]   [facility] [address]   [phone]   Dogbxic-[phone]    Urgent Care locations:    [facility]  Monday-Friday   5 pm - 9 pm   Saturday and Sunday    9 am - 5 pm      Monday-Friday    11 pm - 9 pm  Saturday and Sunday   9 am - 5 pm    [phone]     If you need a medication refill, please contact your pharmacy. Please allow 3 business days for your refill to be completed.  As always, Thank you for trusting us with your healthcare needs!            Follow-ups after your visit        Your next 10 appointments already scheduled     Oct 19, 2018  2:30 PM CDT   US PELVIC COMPLETE W TRANSVAGINAL with BEUS1   [facility] ([facility])    [address] [address]   [phone]           Please bring a list of your medicines (including vitamins, minerals and  over-the-counter drugs). Also, tell your doctor about any allergies you may have. Wear comfortable clothes and leave your valuables at home.  Adults: Drink four 8-ounce glasses of fluid an hour before your exam. If you need to empty your bladder before your exam, try to release only a little urine. Then, drink another glass of fluid.  Children: Children who are potty trained up to 6 years old should drink at least 2 cups (16 oz) of water/non-carbonated beverage 30 minutes prior to the exam. Children who are 6-10 years should drink at least 3 cups (24 oz) of water/non-carbonated beverage 45 minutes prior to the exam. Children who are 10 years or older should drink at least 4 cups (32 oz) of water/non-carbonated beverage 45 minutes prior to the exam. If your child is very uncomfortable or has an urgent need to pee, please notify a technologist; they will try to find out how much longer the wait may be and provide instructions to help relieve the pressure.  Please call the Imaging Department at your exam site with any questions.            Oct 26, 2018  1:30 PM CDT   [facility] Lab Draw with  MASONIC LAB DRAW   [facility] Draw ([facility])    [address] [place] [address]  [address]  [facility] [address] [phone]            Oct 26, 2018  2:00 PM CDT   (Arrive by 1:45 PM)   Return Visit with CLAUDIA Chandler CNP   [facility] ([facility])    [address] [place] [address]  [address]  [facility] [id number] [phone]              Who to contact     If you have questions or need follow up information about today's clinic visit or your schedule please contact [facility] directly at [phone].  Normal or non-critical lab and imaging results will be communicated to you by MyChart, letter or phone within 4 business days after the clinic has received the results. If you do not hear from us within 7 days, please contact the  clinic through [employer] or phone. If you have a critical or abnormal lab result, we will notify you by phone as soon as possible.  Submit refill requests through [employer] or call your pharmacy and they will forward the refill request to us. Please allow 3 business days for your refill to be completed.          Additional Information About Your Visit        [employer] Information     [employer] gives you secure access to your electronic health record. If you see a primary care provider, you can also send messages to your care team and make appointments. If you have questions, please call your primary care clinic.  If you do not have a primary care provider, please call [phone] and they will assist you.        Care EveryWhere ID     This is your Care EveryWhere ID. This could be used by other organizations to access your [place] medical records  [id number]        Your Vitals Were     Pulse Temperature Last Period Breastfeeding? BMI (Body Mass Index)       112 98.9  F (37.2  C) (Tympanic) 07/18/2018 No 20.9 kg/m2        Blood Pressure from Last 3 Encounters:   07/26/18 106/69   04/27/18 103/66   01/30/18 104/68    Weight from Last 3 Encounters:   07/26/18 118 lb (53.5 kg)   04/27/18 117 lb 8 oz (53.3 kg)   01/30/18 115 lb 9.6 oz (52.4 kg)              Today, you had the following     No orders found for display       Primary Care Provider Office Phone # Fax #    Tamica Arlet Alec Durán, APRN [facility] [phone] [address] [place] [facility] [address]        Equal Access to Services     JAY TORRES : Hadii dyana ku hadasho Soomaali, waaxda luqadaha, qaybta kaalmada adeegyada, wing prieto. So [facility] [phone].    ATENCIÓN: Si habla español, tiene a mccauley disposición servicios gratuitos de asistencia lingüística. Llame al [phone].    We comply with applicable federal civil rights laws and [place] laws. We do not discriminate on the basis of race, color, national origin, age,  disability, sex, sexual orientation, or gender identity.            Thank you!     Thank you for choosing [facility]  for your care. Our goal is always to provide you with excellent care. Hearing back from our patients is one way we can continue to improve our services. Please take a few minutes to complete the written survey that you may receive in the mail after your visit with us. Thank you!             Your Updated Medication List - Protect others around you: Learn how to safely use, store and throw away your medicines at www.disposemymeds.org.          This list is accurate as of 7/26/18  1:54 PM.  Always use your most recent med list.                   Brand Name Dispense Instructions for use Diagnosis    ALPRAZolam 0.25 MG tablet    XANAX    40 tablet    Take 1 tablet (0.25 mg) by mouth 2 times daily as needed for anxiety    JOHN PAUL (generalized anxiety disorder)       FLUoxetine 20 MG capsule    PROzac    90 capsule    Take 1 capsule (20 mg) by mouth daily    JOHN PAUL (generalized anxiety disorder)       MULTIPLE VITAMINS PO      take  by mouth.        VITAMIN D-3 PO

## 2018-07-26 NOTE — PATIENT INSTRUCTIONS
If you have any questions regarding your visit, Please contact your care team.    [employer] Access Services: [phone]      [facility] CLINIC HOURS TELEPHONE NUMBER   Cephas Agbeh, M.D.    MOOKIE Bolton,     YAZ Cuevas, YAZ             Monday-[place]    8:00a.m-4:45 p.m    Tuesday--[place]     8:00a.m-4:45 p.m.    Thursday-[place]    8:00a.m-4:45 p.m.    Friday-[place]    8:00a.m-4:45 p.m    [facility]   [address]   [place] [address] [phone]-Ask for [facility]   Fax [phone]   Toxqgyn-[phone]     [facility]   [address] [place] [address] [address]   [place], [place] [address] [phone]     [facility]   [address] [facility] [address] [phone]   Uypckys-[phone]    Urgent Care locations:    [facility]  Monday-Friday   5 pm - 9 pm   Saturday and Sunday    9 am - 5 pm      Monday-Friday    11 pm - 9 pm  Saturday and Sunday   9 am - 5 pm    [phone]     If you need a medication refill, please contact your pharmacy. Please allow 3 business days for your refill to be completed.  As always, Thank you for trusting us with your healthcare needs!

## 2018-07-26 NOTE — PROGRESS NOTES
"Kelley is a 28 year old  referred here by self for consultation regarding pelvic pain post surgery.  Her history is bellow per GYN.ONCOLOGY notes..  Her RLQ pain is during the first 3 days of her cycle and resolved by the day 6 or 7. This began about a year ago.She denies pain with sex. Denies pain outside of her menses. She has had physical therapy for the last 6 months since it was suspected her pain was from surgery scar tissue.. The Patient has marginally helped. With her LMP las weekend she tried Ibuprofen when the pain was worse. She is not on any contraception..  Menarche was at age 9 and she had cramps until she began taking OCP till just before her surgery.   She and spouse do jordana plan on pregnancy any time soon or even at all. The Mirena IUD was suggested as an alternative , since she is very weary of estrogen containing contraception.    \"\"Brief Oncology History:  9/29/15: Abdominal pain, 30cm pelvic mass.  73.  10/2/15: Sent to OR for ex lap, RSO, and fertility sparing staging. 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 Elected observation at this time.  16:  13  16:  9, Pap NIL  10/31/16:  10  17:  13  17:  10  17:  10  10/27/17:  10  10/28/17: Pelvic ultrasound:  IMPRESSION:   1. Normal sonographic evaluation of the uterus and left ovary.  2. Trace free fluid in the pelvis which may be physiologic.   18:  pending\"\"      ROS: Ten point review of systems was reviewed and negative except the above.    Gyne: - abn pap (last pap ), - STD's    Past Medical History:   Diagnosis Date     Allergic rhinitis due to animal dander      Allergy to mold spores     14 skin tests pos. for:  dog(+)/DM/M only and only on intradermals--ie, minimally allergic     Asthma, mild intermittent     last inhaler usage >6 years ago ()     Cancer (H) 2015    Stage 1A ovarian cancer     Diagnostic skin and " sensitization tests 2/26/14 skin tests pos. 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    treated with RSO     Past Surgical History:   Procedure Laterality Date     APPENDECTOMY  10/2/2015     BIOPSY  10/2/2015     COLONOSCOPY N/A 12/2/2015    Procedure: COLONOSCOPY;  Surgeon: Aaron Garrison MD;  Location:  GI     ESOPHAGOSCOPY, GASTROSCOPY, DUODENOSCOPY (EGD), COMBINED N/A 11/23/2015    Procedure: COMBINED ESOPHAGOSCOPY, GASTROSCOPY, DUODENOSCOPY (EGD), BIOPSY SINGLE OR MULTIPLE;  Surgeon: Babs Cassidy MD;  Location:  GI     GYN SURGERY  10/2/2015     LAPAROTOMY EXPLORATORY  10/2/15    Right salpingo-oophorectomy, omentectomy, appendectomy, lymph node biopsies, cancer staging      LAPAROTOMY, STAGING, COMBINED N/A 10/2/2015    Procedure: COMBINED LAPAROTOMY, STAGING;  Surgeon: Duyen Thayer MD;  Location:  OR     Patient Active Problem List   Diagnosis     Esophageal reflux     Benign neoplasm of skin     CARDIOVASCULAR SCREENING; LDL GOAL LESS THAN 160     Lactose intolerance     Allergy to mold spores     House dust mite allergy     Allergic rhinitis due to animal dander     Diagnostic skin and sensitization tests     Family history of colon cancer     Mucinous cystadenoma, borderline malignancy     Vitamin D deficiency     JOHN PAUL (generalized anxiety disorder)     Family history of thyroid disease in mother       ALL/Meds: Her medication and allergy histories were reviewed and are documented in their appropriate chart areas.    SH: - tob, - EtOH,     FH: Her family history was reviewed and documented in its appropriate chart area.    PE: /69  Pulse 112  Temp 98.9  F (37.2  C) (Tympanic)  Wt 118 lb (53.5 kg)  LMP 07/18/2018  Breastfeeding? No  BMI 20.9 kg/m2  Body mass index is 20.9 kg/(m^2).      General:  WNWD  female, NAD  Alert  Oriented x 3  Gait:  Normal  Skin:  Normal skin turgor  HEENT:  NC/AT, EOMI  Abdomen:  Non-tender, non-distended.  Pelvic exam:  Not performed  Extremities:  No clubbing, no cyanosis and no edema.      A/P    ICD-10-CM    1. Dysmenorrhea N94.6    2. Pelvic pain in female R10.2      Discussed the pathophysiology and chronic nature of Dysmenorrhea. ACOG pamphlet  was  given to the patient on the above topics.. Discussed how prostaglandins production can contribute to cyclic pain. She has no pain outside of her cycles. Discussed use os NSAIDS, prefferaby at least a day before her menses throught at least the first 3 days regulary well controlled also discussed the use of hormonal manipulation with oral contraceptive pills, patch, ring, Depo-provera, the Implant or progesterone IUD.      She does not want to consider OCP . She may consider the MIRENA or KYLENA IUD. If the NSAIDS approach discussed above fails.    Written information was provided about the above options.    She will check with ONCOLOGY before proceeding.      25 minutes was spent face to face with the patient today discussing her history, diagnosis, and follow-up plan as noted above.  Over 50% of the visit was spent in counseling and coordination of care.    Total Visit Time: 30 minutes.    CEPHAS AGBEH, MD.

## 2018-08-21 ENCOUNTER — OFFICE VISIT (OUTPATIENT)
Dept: FAMILY MEDICINE | Facility: CLINIC | Age: 28
End: 2018-08-21
Payer: COMMERCIAL

## 2018-08-21 VITALS
DIASTOLIC BLOOD PRESSURE: 78 MMHG | TEMPERATURE: 98.2 F | HEIGHT: 63 IN | WEIGHT: 120.4 LBS | BODY MASS INDEX: 21.33 KG/M2 | SYSTOLIC BLOOD PRESSURE: 125 MMHG | HEART RATE: 87 BPM

## 2018-08-21 DIAGNOSIS — R42 LIGHT HEADED: Primary | ICD-10-CM

## 2018-08-21 DIAGNOSIS — E55.9 VITAMIN D DEFICIENCY: ICD-10-CM

## 2018-08-21 DIAGNOSIS — F32.0 MILD MAJOR DEPRESSION (H): ICD-10-CM

## 2018-08-21 DIAGNOSIS — J30.1 ACUTE SEASONAL ALLERGIC RHINITIS DUE TO POLLEN: ICD-10-CM

## 2018-08-21 LAB
ALBUMIN UR-MCNC: NEGATIVE MG/DL
APPEARANCE UR: CLEAR
BACTERIA #/AREA URNS HPF: ABNORMAL /HPF
BASOPHILS # BLD AUTO: 0 10E9/L (ref 0–0.2)
BASOPHILS NFR BLD AUTO: 0.9 %
BILIRUB UR QL STRIP: NEGATIVE
COLOR UR AUTO: YELLOW
DIFFERENTIAL METHOD BLD: NORMAL
EOSINOPHIL # BLD AUTO: 0.4 10E9/L (ref 0–0.7)
EOSINOPHIL NFR BLD AUTO: 8.5 %
ERYTHROCYTE [DISTWIDTH] IN BLOOD BY AUTOMATED COUNT: 13.1 % (ref 10–15)
GLUCOSE BLD-MCNC: 89 MG/DL (ref 70–99)
GLUCOSE UR STRIP-MCNC: NEGATIVE MG/DL
HCT VFR BLD AUTO: 37.2 % (ref 35–47)
HGB BLD-MCNC: 12.1 G/DL (ref 11.7–15.7)
HGB UR QL STRIP: ABNORMAL
KETONES UR STRIP-MCNC: NEGATIVE MG/DL
LEUKOCYTE ESTERASE UR QL STRIP: NEGATIVE
LYMPHOCYTES # BLD AUTO: 1.6 10E9/L (ref 0.8–5.3)
LYMPHOCYTES NFR BLD AUTO: 36.8 %
MCH RBC QN AUTO: 28.8 PG (ref 26.5–33)
MCHC RBC AUTO-ENTMCNC: 32.5 G/DL (ref 31.5–36.5)
MCV RBC AUTO: 89 FL (ref 78–100)
MONOCYTES # BLD AUTO: 0.4 10E9/L (ref 0–1.3)
MONOCYTES NFR BLD AUTO: 8.7 %
NEUTROPHILS # BLD AUTO: 2 10E9/L (ref 1.6–8.3)
NEUTROPHILS NFR BLD AUTO: 45.1 %
NITRATE UR QL: NEGATIVE
PH UR STRIP: 7 PH (ref 5–7)
PLATELET # BLD AUTO: 182 10E9/L (ref 150–450)
RBC # BLD AUTO: 4.2 10E12/L (ref 3.8–5.2)
RBC #/AREA URNS AUTO: ABNORMAL /HPF
SOURCE: ABNORMAL
SP GR UR STRIP: 1.01 (ref 1–1.03)
UROBILINOGEN UR STRIP-ACNC: 0.2 EU/DL (ref 0.2–1)
WBC # BLD AUTO: 4.4 10E9/L (ref 4–11)
WBC #/AREA URNS AUTO: ABNORMAL /HPF

## 2018-08-21 PROCEDURE — 81001 URINALYSIS AUTO W/SCOPE: CPT | Performed by: PHYSICIAN ASSISTANT

## 2018-08-21 PROCEDURE — 99214 OFFICE O/P EST MOD 30 MIN: CPT | Performed by: PHYSICIAN ASSISTANT

## 2018-08-21 PROCEDURE — 82947 ASSAY GLUCOSE BLOOD QUANT: CPT | Performed by: PHYSICIAN ASSISTANT

## 2018-08-21 PROCEDURE — 85025 COMPLETE CBC W/AUTO DIFF WBC: CPT | Performed by: PHYSICIAN ASSISTANT

## 2018-08-21 PROCEDURE — 36415 COLL VENOUS BLD VENIPUNCTURE: CPT | Performed by: PHYSICIAN ASSISTANT

## 2018-08-21 RX ORDER — LORATADINE 10 MG/1
10 TABLET ORAL DAILY
Qty: 30 TABLET | Refills: 1 | Status: SHIPPED | OUTPATIENT
Start: 2018-08-21 | End: 2023-12-12

## 2018-08-21 ASSESSMENT — ANXIETY QUESTIONNAIRES
7. FEELING AFRAID AS IF SOMETHING AWFUL MIGHT HAPPEN: NOT AT ALL
6. BECOMING EASILY ANNOYED OR IRRITABLE: NEARLY EVERY DAY
2. NOT BEING ABLE TO STOP OR CONTROL WORRYING: NOT AT ALL
3. WORRYING TOO MUCH ABOUT DIFFERENT THINGS: MORE THAN HALF THE DAYS
GAD7 TOTAL SCORE: 7
1. FEELING NERVOUS, ANXIOUS, OR ON EDGE: MORE THAN HALF THE DAYS
5. BEING SO RESTLESS THAT IT IS HARD TO SIT STILL: NOT AT ALL

## 2018-08-21 ASSESSMENT — PATIENT HEALTH QUESTIONNAIRE - PHQ9: 5. POOR APPETITE OR OVEREATING: NOT AT ALL

## 2018-08-21 NOTE — PROGRESS NOTES
"  SUBJECTIVE:   Kelley Serra is a 28 year old female who presents to clinic today for the following health issues:      Patient said she has had on and off episodes for the past week of light headed, shaky, sweating, more fatigued. Patient said she has been feeling \"Foggy.\"  Patient said she has a family history of diabetes.        Problem list and histories reviewed & adjusted, as indicated.  Additional history: Reviewed past labs with Patient.  She is amenable to anxiety screening. PHQ-9 and JOHN PAUL-7 obtained and reviewed.        ROS:  Constitutional, HEENT, cardiovascular, pulmonary, gi and gu systems are negative, except as otherwise noted.    OBJECTIVE:     /78 (BP Location: Right arm, Patient Position: Sitting, Cuff Size: Adult Regular)  Pulse 87  Temp 98.2  F (36.8  C) (Tympanic)  Ht 5' 3\" (1.6 m)  Wt 120 lb 6.4 oz (54.6 kg)  BMI 21.33 kg/m2  Body mass index is 21.33 kg/(m^2).  GENERAL: healthy, alert and no distress  EYES: Eyes grossly normal to inspection, PERRL and conjunctivae and sclerae normal  HENT: ear canals and TM's normal, nose and mouth without ulcers or lesions  NECK: no adenopathy, no asymmetry, masses, or scars and thyroid normal to palpation  RESP: lungs clear to auscultation - no rales, rhonchi or wheezes  CV: regular rate and rhythm, normal S1 S2, no S3 or S4, no murmur, click or rub, no peripheral edema and peripheral pulses strong  ABDOMEN: soft, nontender, no hepatosplenomegaly, no masses and bowel sounds normal  MS: no gross musculoskeletal defects noted, no edema  PSYCH: Psych: Alert and oriented times 3; coherent speech, normal   rate and volume, able to articulate logical thoughts, able   to abstract reason, no tangential thoughts, no hallucinations   or delusions  Her affect is congruent.      Diagnostic Test Results:  none     ASSESSMENT/PLAN:   1. Light headed  - Glucose, whole blood  - *UA reflex to Microscopic and Culture ([facility] [place] [facility] [place])  - CBC with platelets and differential  - Urine Microscopic    2. Acute seasonal allergic rhinitis due to pollen  - loratadine (CLARITIN) 10 MG tablet; Take 1 tablet (10 mg) by mouth daily  Dispense: 30 tablet; Refill: 1    3. Vitamin D deficiency  MVI with minberals  Vit D 2,000 unit(s). daily    4. Mild major depression (H)  Follow up in 2 weeks to review treatment options.     Patient amenable to this follow up plan.    Reed De Jesus PA-C  [facility]

## 2018-08-21 NOTE — MR AVS SNAPSHOT
After Visit Summary   8/21/2018    Kelley Serra    MRN: [id number]           Patient Information     Date Of Birth          1990        Visit Information        Provider Department      8/21/2018 9:20 AM Reed De Jesus PA-C [facility]        Today's Diagnoses     Light headed    -  1    Acute seasonal allergic rhinitis due to pollen        Vitamin D deficiency        Mild major depression (H)           Follow-ups after your visit        Your next 10 appointments already scheduled     Oct 19, 2018  2:30 PM CDT   US PELVIC COMPLETE W TRANSVAGINAL with BEUS1   [facility]    [address] [id number] [phone]           Please bring a list of your medicines (including vitamins, minerals and over-the-counter drugs). Also, tell your doctor about any allergies you may have. Wear comfortable clothes and leave your valuables at home.  Adults: Drink four 8-ounce glasses of fluid an hour before your exam. If you need to empty your bladder before your exam, try to release only a little urine. Then, drink another glass of fluid.  Children: Children who are potty trained up to 6 years old should drink at least 2 cups (16 oz) of water/non-carbonated beverage 30 minutes prior to the exam. Children who are 6-10 years should drink at least 3 cups (24 oz) of water/non-carbonated beverage 45 minutes prior to the exam. Children who are 10 years or older should drink at least 4 cups (32 oz) of water/non-carbonated beverage 45 minutes prior to the exam. If your child is very uncomfortable or has an urgent need to pee, please notify a technologist; they will try to find out how much longer the wait may be and provide instructions to help relieve the pressure.  Please call the Imaging Department at your exam site with any questions.            Oct 26, 2018  1:30 PM CDT   [facility] Lab Draw with  [facility] LAB DRAW   [facility]  "Lab Draw ([facility])    [address]  [address]  [place] [id number] [phone]            Oct 26, 2018  2:00 PM CDT   (Arrive by 1:45 PM)   Return Visit with CLAUDIA Chandler CNP   [facility] ([facility])    [address]  [address]  [place] [id number] [phone]              Who to contact     Normal or non-critical lab and imaging results will be communicated to you by [employer], letter or phone within 4 business days after the clinic has received the results. If you do not hear from us within 7 days, please contact the clinic through [employer] or phone. If you have a critical or abnormal lab result, we will notify you by phone as soon as possible.  Submit refill requests through [employer] or call your pharmacy and they will forward the refill request to us. Please allow 3 business days for your refill to be completed.          If you need to speak with a  for additional information , please call: [phone]             Additional Information About Your Visit        [employer] Information     [employer] gives you secure access to your electronic health record. If you see a primary care provider, you can also send messages to your care team and make appointments. If you have questions, please call your primary care clinic.  If you do not have a primary care provider, please call [phone] and they will assist you.        Care EveryWhere ID     This is your Care EveryWhere ID. This could be used by other organizations to access your [facility] medical records  [id number]        Your Vitals Were     Pulse Temperature Height BMI (Body Mass Index)          87 98.2  F (36.8  C) (Tympanic) 5' 3\" (1.6 m) 21.33 kg/m2         Blood Pressure from Last 3 Encounters:   08/21/18 125/78   07/26/18 106/69   04/27/18 103/66    Weight from Last 3 Encounters:   08/21/18 120 lb 6.4 oz (54.6 kg)   07/26/18 118 lb " (53.5 kg)   04/27/18 117 lb 8 oz (53.3 kg)              We Performed the Following     *UA reflex to Microscopic and Culture ([facility] (except [place] and [place])     CBC with platelets and differential     Glucose, whole blood     Urine Microscopic          Today's Medication Changes          These changes are accurate as of 8/21/18 12:25 PM.  If you have any questions, ask your nurse or doctor.               Start taking these medicines.        Dose/Directions    loratadine 10 MG tablet   Commonly known as:  CLARITIN   Used for:  Acute seasonal allergic rhinitis due to pollen   Started by:  Reed De Jesus PA-C        Dose:  10 mg   Take 1 tablet (10 mg) by mouth daily   Quantity:  30 tablet   Refills:  1            Where to get your medicines      These medications were sent to [facility] [place] - [address]  [address] [address] [facility] [address]     Phone:  [phone]     loratadine 10 MG tablet                Primary Care Provider Office Phone # Fax #    Tamica Durán, APRN [facility] [phone] [address] [place] [address]        Equal Access to Services     [facility]: Hadii aad ku hadasho Soomaali, waaxda luqadaha, qaybta kaalmada adeegyada, wing morrisin hayaan lc magallanes . So [facility] [phone].    ATENCIÓN: Si habla español, tiene a mccauley disposición servicios gratuitos de asistencia lingüística. Llame al [phone].    We comply with applicable federal civil rights laws and [place] laws. We do not discriminate on the basis of race, color, national origin, age, disability, sex, sexual orientation, or gender identity.            Thank you!     Thank you for choosing [facility]  for your care. Our goal is always to provide you with excellent care. Hearing back from our patients is one way we can continue to improve our services. Please take a few minutes to complete the written survey that  you may receive in the mail after your visit with us. Thank you!             Your Updated Medication List - Protect others around you: Learn how to safely use, store and throw away your medicines at www.disposemymeds.org.          This list is accurate as of 8/21/18 12:25 PM.  Always use your most recent med list.                   Brand Name Dispense Instructions for use Diagnosis    ALPRAZolam 0.25 MG tablet    XANAX    40 tablet    Take 1 tablet (0.25 mg) by mouth 2 times daily as needed for anxiety    JOHN PAUL (generalized anxiety disorder)       FLUoxetine 20 MG capsule    PROzac    90 capsule    Take 1 capsule (20 mg) by mouth daily    JOHN PAUL (generalized anxiety disorder)       loratadine 10 MG tablet    CLARITIN    30 tablet    Take 1 tablet (10 mg) by mouth daily    Acute seasonal allergic rhinitis due to pollen       MULTIPLE VITAMINS PO      take  by mouth.        VITAMIN D-3 PO

## 2018-08-22 ASSESSMENT — PATIENT HEALTH QUESTIONNAIRE - PHQ9: SUM OF ALL RESPONSES TO PHQ QUESTIONS 1-9: 14

## 2018-08-22 ASSESSMENT — ANXIETY QUESTIONNAIRES: GAD7 TOTAL SCORE: 7

## 2018-08-25 DIAGNOSIS — F41.1 GAD (GENERALIZED ANXIETY DISORDER): ICD-10-CM

## 2018-08-27 NOTE — TELEPHONE ENCOUNTER
"Requested Prescriptions   Pending Prescriptions Disp Refills     FLUoxetine (PROZAC) 20 MG capsule [Pharmacy Med Name: FLUOXETINE 20MG CAPSULES] 90 capsule 0    Last Written Prescription Date:  02/26/2018 #90 x 1  Last filled 05/25/2018  Last office visit: 01/30/2018 MARY Durán   Future Office Visit:  None   Sig: TAKE 1 CAPSULE(20 MG) BY MOUTH DAILY    SSRIs Protocol Passed    8/25/2018  4:10 AM  PHQ-9 SCORE 4/14/2011 1/30/2018 8/21/2018   Total Score 0 - -   Total Score - 7 14       JOHN PAUL-7 SCORE 7/10/2017 1/30/2018 8/21/2018   Total Score 5 (mild anxiety) - -   Total Score 5 6 7              Passed - Recent (12 mo) or future (30 days) visit within the authorizing provider's specialty    Patient had office visit in the last 12 months or has a visit in the next 30 days with authorizing provider or within the authorizing provider's specialty.  See \"Patient Info\" tab in inbasket, or \"Choose Columns\" in Meds & Orders section of the refill encounter.           Passed - Patient is age 18 or older       Passed - No active pregnancy on record       Passed - No positive pregnancy test in last 12 months          "

## 2018-08-27 NOTE — TELEPHONE ENCOUNTER
She needs to fill out the PHQ-9 and JOHN PAUL 7 it has been 7 months since this has been filled out

## 2018-08-27 NOTE — TELEPHONE ENCOUNTER
**This refill requires provider completion and is not appropriate for RN review per RN refill guidelines.**  PH-Q9 needs to be less than 5 to approve medication on RN Refill protocol pt's score is 14.  Aylin Dozier RN

## 2018-08-30 ENCOUNTER — TELEPHONE (OUTPATIENT)
Dept: FAMILY MEDICINE | Facility: CLINIC | Age: 28
End: 2018-08-30

## 2018-09-17 PROBLEM — F32.0 MILD MAJOR DEPRESSION (H): Status: ACTIVE | Noted: 2018-09-17

## 2018-10-19 ENCOUNTER — RADIANT APPOINTMENT (OUTPATIENT)
Dept: ULTRASOUND IMAGING | Facility: CLINIC | Age: 28
End: 2018-10-19
Attending: NURSE PRACTITIONER
Payer: COMMERCIAL

## 2018-10-19 DIAGNOSIS — D39.11 OVARIAN TUMOR OF BORDERLINE MALIGNANCY, RIGHT: ICD-10-CM

## 2018-10-19 PROCEDURE — 76830 TRANSVAGINAL US NON-OB: CPT

## 2018-10-19 PROCEDURE — 76856 US EXAM PELVIC COMPLETE: CPT

## 2018-10-26 ENCOUNTER — ONCOLOGY VISIT (OUTPATIENT)
Dept: ONCOLOGY | Facility: CLINIC | Age: 28
End: 2018-10-26
Attending: NURSE PRACTITIONER
Payer: COMMERCIAL

## 2018-10-26 ENCOUNTER — APPOINTMENT (OUTPATIENT)
Dept: LAB | Facility: CLINIC | Age: 28
End: 2018-10-26
Attending: NURSE PRACTITIONER
Payer: COMMERCIAL

## 2018-10-26 VITALS
BODY MASS INDEX: 20.82 KG/M2 | HEART RATE: 94 BPM | SYSTOLIC BLOOD PRESSURE: 117 MMHG | WEIGHT: 117.5 LBS | OXYGEN SATURATION: 98 % | HEIGHT: 63 IN | DIASTOLIC BLOOD PRESSURE: 72 MMHG | RESPIRATION RATE: 16 BRPM | TEMPERATURE: 98.5 F

## 2018-10-26 DIAGNOSIS — D39.11 OVARIAN TUMOR OF BORDERLINE MALIGNANCY, RIGHT: Primary | ICD-10-CM

## 2018-10-26 DIAGNOSIS — N89.8 VAGINAL DISCHARGE: ICD-10-CM

## 2018-10-26 LAB
CANCER AG125 SERPL-ACNC: 10 U/ML (ref 0–30)
SPECIMEN SOURCE: NORMAL
WET PREP SPEC: NORMAL

## 2018-10-26 PROCEDURE — 86304 IMMUNOASSAY TUMOR CA 125: CPT | Performed by: NURSE PRACTITIONER

## 2018-10-26 PROCEDURE — G0463 HOSPITAL OUTPT CLINIC VISIT: HCPCS | Mod: ZF

## 2018-10-26 PROCEDURE — 87210 SMEAR WET MOUNT SALINE/INK: CPT | Performed by: NURSE PRACTITIONER

## 2018-10-26 PROCEDURE — 99213 OFFICE O/P EST LOW 20 MIN: CPT | Mod: ZP | Performed by: NURSE PRACTITIONER

## 2018-10-26 PROCEDURE — 36415 COLL VENOUS BLD VENIPUNCTURE: CPT

## 2018-10-26 ASSESSMENT — ENCOUNTER SYMPTOMS
NAUSEA: 0
HEMATURIA: 0
DIFFICULTY URINATING: 0
HOT FLASHES: 1
BOWEL INCONTINENCE: 0
ABDOMINAL PAIN: 0
VOMITING: 0
BLOATING: 0
BLOOD IN STOOL: 0
DIARRHEA: 1
RECTAL PAIN: 0
HEARTBURN: 0
DYSURIA: 1
JAUNDICE: 0
FLANK PAIN: 1
DECREASED LIBIDO: 0
CONSTIPATION: 0

## 2018-10-26 ASSESSMENT — PAIN SCALES - GENERAL: PAINLEVEL: NO PAIN (0)

## 2018-10-26 NOTE — LETTER
10/26/2018       RE: Kelley Serra  [address] [place] [facility] [address]     Dear Colleague,    Thank you for referring your patient, Kelley Serra, to the [facility]. Please see a copy of my visit note below.    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 She completed treatment on 10/2/15 with an exploratory laparotomy, RSO, and fertility sparing staging. She presents today for a six month surveillance visit.    HPI: Oscar comes to the clinic feeling well. Has been doing pelvic floor physical therapy and yoga for chronic pelvic pain prior to her periods- has greatly helped. Periods are regular and her LMP was pain free. Sexually active, no dyspareunia- using condoms for pregnancy prevention, no current plans for pregnancy. Notes long standing diarrhea roughly once per week, unable to identify a cause. Notes vulvar discomfort about thirty minutes after urinating, has had more thin white vaginal discharge than normal. Does shave pubic hair. Denies vulvar sores, masses, or skin breakdown. Occasionally has L flank pain in the mornings, resolves over time. Up to date on visits with her PCP. She does not want a flu shot today but states she will consider this over the next few weeks.    Brief Oncology History:  9/29/15: Abdominal pain, 30cm pelvic mass.  73.  10/2/15: Sent to OR for ex lap, RSO, and fertility sparing staging. 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 Elected observation at this time.  16:  13  16:  9, Pap NIL  10/31/16:  10  17:  13  17:  10  17:  10  10/27/17:  10  10/28/17: Pelvic ultrasound:  IMPRESSION:   1. Normal sonographic evaluation of the uterus and left  ovary.  2. Trace free fluid in the pelvis which may be physiologic.   4/27/18:  9  10/20/18: Pelvic US impression:  Right ovary is surgically absent. Uterus, endometrial  stripe and left ovary are unremarkable. Small amount of free pelvic  fluid is likely physiologic.    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 for:  dog(+)/DM/M only and only on intradermals--ie, minimally allergic     Asthma, mild intermittent     last inhaler usage >6 years ago (4/11)     Cancer (H) 9/2015    Stage 1A ovarian cancer     Diagnostic skin and sensitization tests 2/26/14 skin tests pos. 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    treated with RSO       Past Surgical History:   Procedure Laterality Date     APPENDECTOMY  10/2/2015     BIOPSY  10/2/2015     COLONOSCOPY N/A 12/2/2015    Procedure: COLONOSCOPY;  Surgeon: Aaron Garrison MD;  Location:  GI     ESOPHAGOSCOPY, GASTROSCOPY, DUODENOSCOPY (EGD), COMBINED N/A 11/23/2015    Procedure: COMBINED ESOPHAGOSCOPY, GASTROSCOPY, DUODENOSCOPY (EGD), BIOPSY SINGLE OR MULTIPLE;  Surgeon: Babs Cassidy MD;  Location:  GI     GYN SURGERY  10/2/2015     LAPAROTOMY EXPLORATORY  10/2/15    Right salpingo-oophorectomy, omentectomy, appendectomy, lymph node biopsies, cancer staging      LAPAROTOMY, STAGING, COMBINED N/A 10/2/2015    Procedure: COMBINED LAPAROTOMY, STAGING;  Surgeon: Duyen Thayer MD;  Location:  OR       Social History     Social History     Marital status:      Spouse name: N/A     Number of children: N/A     Years of education: N/A     Occupational History     Not on file.     Social History Main Topics     Smoking status: Never Smoker     Smokeless tobacco: Never Used     Alcohol use No      Drug use: No     Sexual activity: Yes     Partners: Male     Birth control/ protection: Condom     Other Topics Concern     Parent/Sibling W/ Cabg, Mi Or Angioplasty Before 65f 55m? No     Social History Narrative       Family History   Problem Relation Age of Onset     Diabetes Mother      gestational     Thyroid Disease Mother      Blood Disease Mother      anemia     Cancer - colorectal Mother 54     Colon Cancer Mother      Doing well     Cancer Maternal Grandmother      Blood Disease Maternal Grandmother      cervical     Respiratory Maternal Grandmother      emphysema     Diabetes Maternal Grandfather           Cerebrovascular Disease Maternal Grandfather      Cancer Maternal Grandfather      kidney     Breast Cancer Paternal Grandmother           Diabetes Paternal Grandfather           Cerebrovascular Disease Paternal Grandfather      HEART DISEASE Father 56     had angioplasty     Coronary Artery Disease Father      Had angioplasty in 2014       Current Outpatient Prescriptions   Medication     ALPRAZolam (XANAX) 0.25 MG tablet     Cholecalciferol (VITAMIN D-3 PO)     FLUoxetine (PROZAC) 20 MG capsule     loratadine (CLARITIN) 10 MG tablet     MULTIPLE VITAMINS PO     No current facility-administered medications for this visit.      Facility-Administered Medications Ordered in Other Visits   Medication     ketorolac (TORADOL) injection          Allergies   Allergen Reactions     Cleocin Rash     Keflex [Cephalexin Monohydrate] Rash     Zoloft        Review of Systems   Answers for HPI/ROS submitted by the patient on 10/26/2018   General Symptoms: No  Skin Symptoms: No  HENT Symptoms: No  EYE SYMPTOMS: No  HEART SYMPTOMS: No  LUNG SYMPTOMS: No  INTESTINAL SYMPTOMS: Yes  URINARY SYMPTOMS: Yes  GYNECOLOGIC SYMPTOMS: Yes  BREAST SYMPTOMS: No  SKELETAL SYMPTOMS: No  BLOOD SYMPTOMS: No  NERVOUS SYSTEM SYMPTOMS: No  MENTAL HEALTH SYMPTOMS: No  Heart burn or indigestion: No  Nausea:  "No  Vomiting: No  Abdominal pain: No  Bloating: No  Constipation: No  Diarrhea: Yes  Blood in stool: No  Black stools: No  Rectal or Anal pain: No  Fecal incontinence: No  Yellowing of skin or eyes: No  Vomit with blood: No  Change in stools: No  Trouble holding urine or incontinence: No  Pain or burning: Yes  Trouble starting or stopping: No  Increased frequency of urination: No  Blood in urine: No  Decreased frequency of urination: No  Frequent nighttime urination: No  Flank pain: Yes  Difficulty emptying bladder: No  Bleeding or spotting between periods: No  Heavy or painful periods: No  Irregular periods: No  Vaginal discharge: Yes  Hot flashes: Yes  Vaginal dryness: No  Genital ulcers: No  Reduced libido: No  Painful intercourse: No  Difficulty with sexual arousal: No  Post-menopausal bleeding: No        OBJECTIVE:    Physical Exam:    /72 (BP Location: Right arm, Patient Position: Sitting, Cuff Size: Adult Regular)  Pulse 94  Temp 98.5  F (36.9  C) (Oral)  Resp 16  Ht 1.6 m (5' 2.99\")  Wt 53.3 kg (117 lb 8.1 oz)  SpO2 98%  BMI 20.82 kg/m2    CONSTITUTIONAL: Alert non-toxic appearing female in no acute distress  NECK: Neck supple without lymphadenopathy  RESPIRATORY: Lungs clear to auscultation, no increased work of breathing noted  CV: Regular rate and rhythm, S1S2, no clicks, murmurs, rubs, or gallops; bilateral lower extremities without edema, dorsalis pedis pulses 2+ bilaterally  GASTROINTESTINAL: Normoactive bowel sounds x4 quadrants, abdomen soft, non-distended, and non-tender to palpation without masses or organomegaly  GENITOURINARY: External genitalia and urethral meatus pink without lesions, masses, or excoriation, though labia majora appear slightly erythematous bilaterally. Vagina with estrogenic effect, no masses or lesions. Cervix without masses or lesions. Moderate amount thin white vaginal discharge noted, wet prep obtained. Bimanual exam reveals no masses, fullness, or cervical " motion tenderness. Rectovaginal exam confirms these findings.  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 Await . Return in one year for surveillance with pelvic ultrasound per updated recommendations per Solomon et al. (2017). Reviewed signs and symptoms  of recurrence including but not limited to bleeding from vagina, bladder, or rectum, bloating, early satiety, swelling in the lower extremities, abdominal or lower back pain, changes in bowel or bladder patterns, shortness of breath, increased fatigue, unexplained weight loss, and night sweats. Reviewed signs and symptoms for when she should contact the clinic or seek additional care. Patient to contact the clinic with any questions or concerns in the interim. Continue pelvic PT for chronic pelvic pain as this has been helpful.  2) Vulvar irritation: Suspect vulvar irritation is the cause of her discomfort half an hour after voiding- wet prep obtained to rule out vaginal infection given increased discharge. Potentially mechanical irritation due to shaving. To avoid shaving for the next few weeks to allow irritation to resolve- no soap while washing, avoid sleeping with underwear/tight clothing. May consider use of Aquaphor or A&D for barrier.  3) Genetic testing: Risk factors have been assessed and the patient does not meet qualifications for genetic counseling based on NCCN guidelines.   4) Labs:  and pelvic ultrasound  5) Health maintenance  issues discussed today include to follow up with PCP for co-morbid conditions and non-gynecologic issues. Pap due in 2019. Reviewed conservative management for night sweats.  6) Patient verbalized understanding of and agreement with plan.    A total of 20 minutes of face to face time spent with patient, over 50% of which was spent in counseling and coordination of care.    CLAUDIA Chandler, SHIVAP-C  Gynecologic Oncology  [facility]  Pager: [phone]

## 2018-10-26 NOTE — MR AVS SNAPSHOT
After Visit Summary   10/26/2018    Kelley Serra    MRN: [id number]           Patient Information     Date Of Birth          1990        Visit Information        Provider Department      10/26/2018 2:00 PM Myra Gifford APRN CNP [facility]        Today's Diagnoses     Vaginal discharge    -  1    Ovarian tumor of borderline malignancy, right           Follow-ups after your visit        Follow-up notes from your care team     Return in about 1 year (around 10/26/2019), or if symptoms worsen or fail to improve.      Your next 10 appointments already scheduled     Oct 21, 2019  7:15 PM CDT   (Arrive by 7:00 PM)   US PELVIC COMPLETE W TRANSVAGINAL with BEUS1   [facility]    [address] [address] [phone]           How do I prepare for my exam? (Food and drink instructions) Adults: Drink four 8-ounce glasses of fluid an hour before your exam. If you need to empty your bladder before your exam, try to release only a little urine. Then, drink another glass of fluid.  Children: * Children who are potty trained up to 6 years old should drink at least 2 cups (16 oz) of water/non-carbonated beverage 30 minutes prior to the exam. * Children who are 6-10 years should drink at least 3 cups (24 oz) of water/non-carbonated beverage 45 minutes prior to the exam. * Children who are 10 years or older should drink at least 4 cups (32 oz) of water/non-carbonated beverage 45 minutes prior to the exam.  If your child is very uncomfortable or has an urgent need to pee, please notify a technologist; they will try to find out how much longer the wait may be and provide instructions to help relieve the pressure.  What should I wear: Wear comfortable clothes.  How long does the exam take: Most ultrasounds take 30 to 60 minutes.  What should I bring: Bring a list of your medicines, including vitamins, minerals and  over-the-counter drugs. It is safest to leave personal items at home.  Do I need a :  No  is needed.  What do I need to tell my doctor: Tell your doctor about any allergies you may have.  What should I do after the exam: No restrictions, You may resume normal activities.  What is this test: An ultrasound uses sound waves to make pictures of the body. Sound waves do not cause pain. The only discomfort may be the pressure of the wand against your skin or full bladder.  Who should I call with questions: If you have any questions, please call the Imaging Department where you will have your exam. Directions, parking instructions, and other information is available on our website, [email or web address].            Oct 25, 2019  3:30 PM CDT   [facility] Lab Draw with  [employer] LAB DRAW   [facility] ([facility])    [address] [place] [address]  [address]  [facility] [id number] [phone]            Oct 25, 2019  4:00 PM CDT   (Arrive by 3:45 PM)   Return Visit with CLAUDIA Chandler CNP   [facility] ([facility])    [address] [place] [address]  [address]  [facility] [id number] [phone]              Future tests that were ordered for you today     Open Future Orders        Priority Expected Expires Ordered    US Pelvic Complete with Transvaginal Routine 10/25/2019 10/26/2019 10/26/2018            Who to contact     If you have questions or need follow up information about today's clinic visit or your schedule please contact [facility] directly at [phone].  Normal or non-critical lab and imaging results will be communicated to you by MyChart, letter or phone within 4 business days after the clinic has received the results. If you do not hear from us within 7 days, please contact the clinic through MyChart or phone. If you have a critical or abnormal lab result, we will notify you by phone as soon as  "possible.  Submit refill requests through [employer] or call your pharmacy and they will forward the refill request to us. Please allow 3 business days for your refill to be completed.          Additional Information About Your Visit        [employer] Information     [employer] gives you secure access to your electronic health record. If you see a primary care provider, you can also send messages to your care team and make appointments. If you have questions, please call your primary care clinic.  If you do not have a primary care provider, please call [phone] and they will assist you.        Care EveryWhere ID     This is your Care EveryWhere ID. This could be used by other organizations to access your [facility] medical records  [id number]        Your Vitals Were     Pulse Temperature Respirations Height Pulse Oximetry BMI (Body Mass Index)    94 98.5  F (36.9  C) (Oral) 16 1.6 m (5' 2.99\") 98% 20.82 kg/m2       Blood Pressure from Last 3 Encounters:   10/26/18 117/72   08/21/18 125/78   07/26/18 106/69    Weight from Last 3 Encounters:   10/26/18 53.3 kg (117 lb 8.1 oz)   08/21/18 54.6 kg (120 lb 6.4 oz)   07/26/18 53.5 kg (118 lb)              We Performed the Following          Wet prep        Primary Care Provider Office Phone # Fax #    Tamica Nice Alec Durán, APRN [phone]       [address] [place] [address]        Equal Access to Services     [facility]: Hadii aad ku hadasho Soomaali, waaxda luqadaha, qaybta kaalmada adeegyada, wing magallanes . So [facility] [phone].    ATENCIÓN: Si habla español, tiene a mccauley disposición servicios gratuitos de asistencia lingüística. Llame al [phone].    We comply with applicable federal civil rights laws and [place] laws. We do not discriminate on the basis of race, color, national origin, age, disability, sex, sexual orientation, or gender identity.            Thank you!     Thank you for choosing [facility]  for your care. Our goal is always to provide you with excellent care. Hearing back from our patients is one way we can continue to improve our services. Please take a few minutes to complete the written survey that you may receive in the mail after your visit with us. Thank you!             Your Updated Medication List - Protect others around you: Learn how to safely use, store and throw away your medicines at www.disposemymeds.org.          This list is accurate as of 10/26/18  2:42 PM.  Always use your most recent med list.                   Brand Name Dispense Instructions for use Diagnosis    ALPRAZolam 0.25 MG tablet    XANAX    40 tablet    Take 1 tablet (0.25 mg) by mouth 2 times daily as needed for anxiety    JOHN PAUL (generalized anxiety disorder)       FLUoxetine 20 MG capsule    PROzac    90 capsule    Take 1 capsule (20 mg) by mouth daily Due to be seen    JOHN PAUL (generalized anxiety disorder)       loratadine 10 MG tablet    CLARITIN    30 tablet    Take 1 tablet (10 mg) by mouth daily    Acute seasonal allergic rhinitis due to pollen       MULTIPLE VITAMINS PO      take  by mouth.        VITAMIN D-3 PO

## 2018-10-26 NOTE — NURSING NOTE
"Oncology Rooming Note    October 26, 2018 1:55 PM   Kelley Serra is a 28 year old female who presents for:    Chief Complaint   Patient presents with     Labs Only     Labs drawn via venipuncture     RECHECK     onc 6 month f/up     Initial Vitals: /72 (BP Location: Right arm, Patient Position: Sitting, Cuff Size: Adult Regular)  Pulse 94  Temp 98.5  F (36.9  C) (Oral)  Resp 16  Ht 1.6 m (5' 2.99\")  Wt 53.3 kg (117 lb 8.1 oz)  SpO2 98%  BMI 20.82 kg/m2 Estimated body mass index is 20.82 kg/(m^2) as calculated from the following:    Height as of this encounter: 1.6 m (5' 2.99\").    Weight as of this encounter: 53.3 kg (117 lb 8.1 oz). Body surface area is 1.54 meters squared.  No Pain (0) Comment: Data Unavailable   No LMP recorded.  Allergies reviewed: Yes  Medications reviewed: Yes    Medications: Medication refills not needed today.  Pharmacy name entered into [employer]: [id number] - [place] [id number] AT [facility]    Clinical concerns: none      6 minutes for nursing intake (face to face time)     Kaya MOOKIE Yepez              "

## 2018-10-26 NOTE — PROGRESS NOTES
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 She completed treatment on 10/2/15 with an exploratory laparotomy, RSO, and fertility sparing staging. She presents today for a six month surveillance visit.    HPI: Oscar comes to the clinic feeling well. Has been doing pelvic floor physical therapy and yoga for chronic pelvic pain prior to her periods- has greatly helped. Periods are regular and her LMP was pain free. Sexually active, no dyspareunia- using condoms for pregnancy prevention, no current plans for pregnancy. Notes long standing diarrhea roughly once per week, unable to identify a cause. Notes vulvar discomfort about thirty minutes after urinating, has had more thin white vaginal discharge than normal. Does shave pubic hair. Denies vulvar sores, masses, or skin breakdown. Occasionally has L flank pain in the mornings, resolves over time. Up to date on visits with her PCP. She does not want a flu shot today but states she will consider this over the next few weeks.    Brief Oncology History:  9/29/15: Abdominal pain, 30cm pelvic mass.  73.  10/2/15: Sent to OR for ex lap, RSO, and fertility sparing staging. 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 Elected observation at this time.  16:  13  16:  9, Pap NIL  10/31/16:  10  17:  13  17:  10  17:  10  10/27/17:  10  10/28/17: Pelvic ultrasound:  IMPRESSION:   1. Normal sonographic evaluation of the uterus and left ovary.  2. Trace free fluid in the pelvis which may be physiologic.   18:  9  10/20/18: Pelvic US impression:  Right ovary is surgically absent. Uterus, endometrial  stripe and left ovary are unremarkable. Small amount of free pelvic  fluid is  likely physiologic.    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 for:  dog(+)/DM/M only and only on intradermals--ie, minimally allergic     Asthma, mild intermittent     last inhaler usage >6 years ago (4/11)     Cancer (H) 9/2015    Stage 1A ovarian cancer     Diagnostic skin and sensitization tests 2/26/14 skin tests pos. 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    treated with RSO       Past Surgical History:   Procedure Laterality Date     APPENDECTOMY  10/2/2015     BIOPSY  10/2/2015     COLONOSCOPY N/A 12/2/2015    Procedure: COLONOSCOPY;  Surgeon: Aaron Garrison MD;  Location:  GI     ESOPHAGOSCOPY, GASTROSCOPY, DUODENOSCOPY (EGD), COMBINED N/A 11/23/2015    Procedure: COMBINED ESOPHAGOSCOPY, GASTROSCOPY, DUODENOSCOPY (EGD), BIOPSY SINGLE OR MULTIPLE;  Surgeon: Babs Cassidy MD;  Location:  GI     GYN SURGERY  10/2/2015     LAPAROTOMY EXPLORATORY  10/2/15    Right salpingo-oophorectomy, omentectomy, appendectomy, lymph node biopsies, cancer staging      LAPAROTOMY, STAGING, COMBINED N/A 10/2/2015    Procedure: COMBINED LAPAROTOMY, STAGING;  Surgeon: Duyen Thayer MD;  Location:  OR       Social History     Social History     Marital status:      Spouse name: N/A     Number of children: N/A     Years of education: N/A     Occupational History     Not on file.     Social History Main Topics     Smoking status: Never Smoker     Smokeless tobacco: Never Used     Alcohol use No     Drug use: No     Sexual activity: Yes     Partners: Male     Birth control/ protection: Condom     Other Topics Concern     Parent/Sibling W/ Cabg, Mi Or Angioplasty Before 65f 55m? No     Social History Narrative       Family History   Problem  Relation Age of Onset     Diabetes Mother      gestational     Thyroid Disease Mother      Blood Disease Mother      anemia     Cancer - colorectal Mother 54     Colon Cancer Mother      Doing well     Cancer Maternal Grandmother      Blood Disease Maternal Grandmother      cervical     Respiratory Maternal Grandmother      emphysema     Diabetes Maternal Grandfather           Cerebrovascular Disease Maternal Grandfather      Cancer Maternal Grandfather      kidney     Breast Cancer Paternal Grandmother           Diabetes Paternal Grandfather           Cerebrovascular Disease Paternal Grandfather      HEART DISEASE Father 56     had angioplasty     Coronary Artery Disease Father      Had angioplasty in 2014       Current Outpatient Prescriptions   Medication     ALPRAZolam (XANAX) 0.25 MG tablet     Cholecalciferol (VITAMIN D-3 PO)     FLUoxetine (PROZAC) 20 MG capsule     loratadine (CLARITIN) 10 MG tablet     MULTIPLE VITAMINS PO     No current facility-administered medications for this visit.      Facility-Administered Medications Ordered in Other Visits   Medication     ketorolac (TORADOL) injection          Allergies   Allergen Reactions     Cleocin Rash     Keflex [Cephalexin Monohydrate] Rash     Zoloft        Review of Systems   Answers for HPI/ROS submitted by the patient on 10/26/2018   General Symptoms: No  Skin Symptoms: No  HENT Symptoms: No  EYE SYMPTOMS: No  HEART SYMPTOMS: No  LUNG SYMPTOMS: No  INTESTINAL SYMPTOMS: Yes  URINARY SYMPTOMS: Yes  GYNECOLOGIC SYMPTOMS: Yes  BREAST SYMPTOMS: No  SKELETAL SYMPTOMS: No  BLOOD SYMPTOMS: No  NERVOUS SYSTEM SYMPTOMS: No  MENTAL HEALTH SYMPTOMS: No  Heart burn or indigestion: No  Nausea: No  Vomiting: No  Abdominal pain: No  Bloating: No  Constipation: No  Diarrhea: Yes  Blood in stool: No  Black stools: No  Rectal or Anal pain: No  Fecal incontinence: No  Yellowing of skin or eyes: No  Vomit with blood: No  Change in stools:  "No  Trouble holding urine or incontinence: No  Pain or burning: Yes  Trouble starting or stopping: No  Increased frequency of urination: No  Blood in urine: No  Decreased frequency of urination: No  Frequent nighttime urination: No  Flank pain: Yes  Difficulty emptying bladder: No  Bleeding or spotting between periods: No  Heavy or painful periods: No  Irregular periods: No  Vaginal discharge: Yes  Hot flashes: Yes  Vaginal dryness: No  Genital ulcers: No  Reduced libido: No  Painful intercourse: No  Difficulty with sexual arousal: No  Post-menopausal bleeding: No        OBJECTIVE:    Physical Exam:    /72 (BP Location: Right arm, Patient Position: Sitting, Cuff Size: Adult Regular)  Pulse 94  Temp 98.5  F (36.9  C) (Oral)  Resp 16  Ht 1.6 m (5' 2.99\")  Wt 53.3 kg (117 lb 8.1 oz)  SpO2 98%  BMI 20.82 kg/m2    CONSTITUTIONAL: Alert non-toxic appearing female in no acute distress  NECK: Neck supple without lymphadenopathy  RESPIRATORY: Lungs clear to auscultation, no increased work of breathing noted  CV: Regular rate and rhythm, S1S2, no clicks, murmurs, rubs, or gallops; bilateral lower extremities without edema, dorsalis pedis pulses 2+ bilaterally  GASTROINTESTINAL: Normoactive bowel sounds x4 quadrants, abdomen soft, non-distended, and non-tender to palpation without masses or organomegaly  GENITOURINARY: External genitalia and urethral meatus pink without lesions, masses, or excoriation, though labia majora appear slightly erythematous bilaterally. Vagina with estrogenic effect, no masses or lesions. Cervix without masses or lesions. Moderate amount thin white vaginal discharge noted, wet prep obtained. Bimanual exam reveals no masses, fullness, or cervical motion tenderness. Rectovaginal exam confirms these findings.  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 Await . Return in one year for surveillance with pelvic ultrasound per updated recommendations per Solomon et al. (2017). Reviewed signs and symptoms  of recurrence including but not limited to bleeding from vagina, bladder, or rectum, bloating, early satiety, swelling in the lower extremities, abdominal or lower back pain, changes in bowel or bladder patterns, shortness of breath, increased fatigue, unexplained weight loss, and night sweats. Reviewed signs and symptoms for when she should contact the clinic or seek additional care. Patient to contact the clinic with any questions or concerns in the interim. Continue pelvic PT for chronic pelvic pain as this has been helpful.  2) Vulvar irritation: Suspect vulvar irritation is the cause of her discomfort half an hour after voiding- wet prep obtained to rule out vaginal infection given increased discharge. Potentially mechanical irritation due to shaving. To avoid shaving for the next few weeks to allow irritation to resolve- no soap while washing, avoid sleeping with underwear/tight clothing. May consider use of Aquaphor or A&D for barrier.  3) Genetic testing: Risk factors have been assessed and the patient does not meet qualifications for genetic counseling based on NCCN guidelines.   4) Labs:  and pelvic ultrasound  5) Health maintenance issues discussed today include to follow up with PCP for co-morbid conditions and non-gynecologic issues. Pap due in 2019. Reviewed conservative management for night sweats.  6) Patient verbalized understanding of and agreement with plan.    A  total of 20 minutes of face to face time spent with patient, over 50% of which was spent in counseling and coordination of care.    CLAUDIA Chandler, SHIVAP-C  Gynecologic Oncology  [facility]  Pager: [phone]

## 2019-01-08 ENCOUNTER — TRANSFERRED RECORDS (OUTPATIENT)
Dept: HEALTH INFORMATION MANAGEMENT | Facility: CLINIC | Age: 29
End: 2019-01-08

## 2019-01-17 ENCOUNTER — MYC MEDICAL ADVICE (OUTPATIENT)
Dept: FAMILY MEDICINE | Facility: CLINIC | Age: 29
End: 2019-01-17

## 2019-01-17 DIAGNOSIS — R19.5 YEAST IN STOOL: Primary | ICD-10-CM

## 2019-01-18 ENCOUNTER — TELEPHONE (OUTPATIENT)
Dept: FAMILY MEDICINE | Facility: CLINIC | Age: 29
End: 2019-01-18

## 2019-01-18 DIAGNOSIS — R19.5 YEAST IN STOOL: ICD-10-CM

## 2019-01-18 RX ORDER — FLUCONAZOLE 100 MG/1
TABLET ORAL
Qty: 31 TABLET | Refills: 0 | Status: SHIPPED | OUTPATIENT
Start: 2019-01-18 | End: 2019-04-25

## 2019-01-18 RX ORDER — FLUCONAZOLE 100 MG/1
100 TABLET ORAL DAILY
Qty: 31 TABLET | Refills: 0 | Status: SHIPPED | OUTPATIENT
Start: 2019-01-18 | End: 2019-01-18

## 2019-01-18 NOTE — TELEPHONE ENCOUNTER
Order entered for fluconazole unclear, 2 different directions in the sig:    Take 1 tablet (100 mg) by mouth daily for 15 days 200 mg on day 1 then 100 mg daily x 4 weeks     Will route to provider to clarify.  They will delete the current prescription and wait for new orders entered.    Tiffani Ortiz RN

## 2019-04-04 DIAGNOSIS — F41.1 GAD (GENERALIZED ANXIETY DISORDER): ICD-10-CM

## 2019-04-04 NOTE — TELEPHONE ENCOUNTER
Routing refill request to provider for review/approval because:  Nicole already given x1, Patient needs to be seen because it has been more than 1 year since last office visit.  Pt has not seen PCP since 1/30/18.  Sur[employer] [employer] Mapping (SAM) message sent to pt.     Rina LEE RN

## 2019-04-04 NOTE — TELEPHONE ENCOUNTER
"Requested Prescriptions   Pending Prescriptions Disp Refills     FLUoxetine (PROZAC) 20 MG capsule [Pharmacy Med Name: FLUOXETINE 20MG CAPSULES] 90 capsule 0    Last Written Prescription Date:  12/17/2018 #90 x 0  Last filled 12/17/2018  Last office visit: 01/30/2018 MARY Durán   Future Office Visit: None    Sig: TAKE 1 CAPSULE(20 MG) BY MOUTH DAILY    SSRIs Protocol Failed - 4/4/2019  9:45 AM  PHQ-9 SCORE 4/14/2011 1/30/2018 8/21/2018   PHQ-9 Total Score 0 - -   PHQ-9 Total Score - 7 14       JOHN PAUL-7 SCORE 7/10/2017 1/30/2018 8/21/2018   Total Score 5 (mild anxiety) - -   Total Score 5 6 7              Failed - Recent (12 mo) or future (30 days) visit within the authorizing provider's specialty    Patient had office visit in the last 12 months or has a visit in the next 30 days with authorizing provider or within the authorizing provider's specialty.  See \"Patient Info\" tab in inbasket, or \"Choose Columns\" in Meds & Orders section of the refill encounter.             Passed - Medication is active on med list       Passed - Patient is age 18 or older       Passed - No active pregnancy on record       Passed - No positive pregnancy test in last 12 months          "

## 2019-04-22 ASSESSMENT — ENCOUNTER SYMPTOMS
HEMATOCHEZIA: 0
FREQUENCY: 0
PALPITATIONS: 0
JOINT SWELLING: 0
WEAKNESS: 0
DIZZINESS: 0
HEMATURIA: 0
EYE PAIN: 0
BREAST MASS: 0
MYALGIAS: 1
SORE THROAT: 0
NAUSEA: 0
COUGH: 0
DIARRHEA: 0
CONSTIPATION: 0
PARESTHESIAS: 0
SHORTNESS OF BREATH: 0
ARTHRALGIAS: 1
FEVER: 0
HEARTBURN: 0
NERVOUS/ANXIOUS: 0
HEADACHES: 0
DYSURIA: 0
ABDOMINAL PAIN: 0
CHILLS: 0

## 2019-04-25 ENCOUNTER — OFFICE VISIT (OUTPATIENT)
Dept: FAMILY MEDICINE | Facility: CLINIC | Age: 29
End: 2019-04-25
Payer: COMMERCIAL

## 2019-04-25 VITALS
TEMPERATURE: 99.1 F | HEART RATE: 84 BPM | BODY MASS INDEX: 20.16 KG/M2 | WEIGHT: 113.8 LBS | HEIGHT: 63 IN | DIASTOLIC BLOOD PRESSURE: 68 MMHG | SYSTOLIC BLOOD PRESSURE: 112 MMHG | RESPIRATION RATE: 10 BRPM

## 2019-04-25 DIAGNOSIS — R68.89 FORGETFULNESS: ICD-10-CM

## 2019-04-25 DIAGNOSIS — F32.0 MILD MAJOR DEPRESSION (H): ICD-10-CM

## 2019-04-25 DIAGNOSIS — L81.9 DISCOLORATION OF SKIN OF FOOT: ICD-10-CM

## 2019-04-25 DIAGNOSIS — E55.9 VITAMIN D DEFICIENCY: ICD-10-CM

## 2019-04-25 DIAGNOSIS — M54.9 UPPER BACK PAIN: ICD-10-CM

## 2019-04-25 DIAGNOSIS — R41.3 MEMORY CHANGES: ICD-10-CM

## 2019-04-25 DIAGNOSIS — R23.2 HOT FLASHES: ICD-10-CM

## 2019-04-25 DIAGNOSIS — F41.1 GAD (GENERALIZED ANXIETY DISORDER): ICD-10-CM

## 2019-04-25 DIAGNOSIS — Z84.81 FAMILY HISTORY OF CARRIER OF GENETIC DISEASE: ICD-10-CM

## 2019-04-25 DIAGNOSIS — E78.5 HYPERLIPIDEMIA LDL GOAL <160: ICD-10-CM

## 2019-04-25 DIAGNOSIS — Z00.00 ROUTINE GENERAL MEDICAL EXAMINATION AT A HEALTH CARE FACILITY: Primary | ICD-10-CM

## 2019-04-25 PROCEDURE — 99214 OFFICE O/P EST MOD 30 MIN: CPT | Mod: 25 | Performed by: NURSE PRACTITIONER

## 2019-04-25 PROCEDURE — 99395 PREV VISIT EST AGE 18-39: CPT | Performed by: NURSE PRACTITIONER

## 2019-04-25 ASSESSMENT — MIFFLIN-ST. JEOR: SCORE: 1206.44

## 2019-04-25 ASSESSMENT — PAIN SCALES - GENERAL: PAINLEVEL: NO PAIN (1)

## 2019-04-25 NOTE — NURSING NOTE
"Initial /68 (BP Location: Left arm, Patient Position: Chair, Cuff Size: Adult Regular)   Pulse 84   Temp 99.1  F (37.3  C) (Tympanic)   Resp 10   Ht 1.602 m (5' 3.07\")   Wt 51.6 kg (113 lb 12.8 oz)   LMP 04/10/2019 (Exact Date)   BMI 20.11 kg/m   Estimated body mass index is 20.11 kg/m  as calculated from the following:    Height as of this encounter: 1.602 m (5' 3.07\").    Weight as of this encounter: 51.6 kg (113 lb 12.8 oz). .    Aliyah Garcia CMA ([facility])    "

## 2019-04-25 NOTE — LETTER
My Depression Action Plan  Name: Kelley Serra   Date of Birth 1990  Date: 4/25/2019    My doctor: Tamica Durán   My clinic: [address] [address] [phone]          GREEN    ZONE   Good Control    What it looks like:     Things are going generally well. You have normal up s and down s. You may even feel depressed from time to time, but bad moods usually last less than a day.   What you need to do:  1. Continue to care for yourself (see self care plan)  2. Check your depression survival kit and update it as needed  3. Follow your physician s recommendations including any medication.  4. Do not stop taking medication unless you consult with your physician first.           YELLOW         ZONE Getting Worse    What it looks like:     Depression is starting to interfere with your life.     It may be hard to get out of bed; you may be starting to isolate yourself from others.    Symptoms of depression are starting to last most all day and this has happened for several days.     You may have suicidal thoughts but they are not constant.   What you need to do:     1. Call your care team, your response to treatment will improve if you keep your care team informed of your progress. Yellow periods are signs an adjustment may need to be made.     2. Continue your self-care, even if you have to fake it!    3. Talk to someone in your support network    4. Open up your depression survival kit           RED    ZONE Medical Alert - Get Help    What it looks like:     Depression is seriously interfering with your life.     You may experience these or other symptoms: You can t get out of bed most days, can t work or engage in other necessary activities, you have trouble taking care of basic hygiene, or basic responsibilities, thoughts of suicide or death that will not go away, self-injurious behavior.     What you need to do:  1. Call your care  team and request a same-day appointment. If they are not available (weekends or after hours) call your local crisis line, emergency room or 911.            Depression Self Care Plan / Survival Kit    Self-Care for Depression  Here s the deal. Your body and mind are really not as separate as most people think.  What you do and think affects how you feel and how you feel influences what you do and think. This means if you do things that people who feel good do, it will help you feel better.  Sometimes this is all it takes.  There is also a place for medication and therapy depending on how severe your depression is, so be sure to consult with your medical provider and/ or Behavioral Health Consultant if your symptoms are worsening or not improving.     In order to better manage my stress, I will:    Exercise  Get some form of exercise, every day. This will help reduce pain and release endorphins, the  feel good  chemicals in your brain. This is almost as good as taking antidepressants!  This is not the same as joining a gym and then never going! (they count on that by the way ) It can be as simple as just going for a walk or doing some gardening, anything that will get you moving.      Hygiene   Maintain good hygiene (Get out of bed in the morning, Make your bed, Brush your teeth, Take a shower, and Get dressed like you were going to work, even if you are unemployed).  If your clothes don't fit try to get ones that do.    Diet  I will strive to eat foods that are good for me, drink plenty of water, and avoid excessive sugar, caffeine, alcohol, and other mood-altering substances.  Some foods that are helpful in depression are: complex carbohydrates, B vitamins, flaxseed, fish or fish oil, fresh fruits and vegetables.    Psychotherapy  I agree to participate in Individual Therapy (if recommended).    Medication  If prescribed medications, I agree to take them.  Missing doses can result in serious side effects.  I  understand that drinking alcohol, or other illicit drug use, may cause potential side effects.  I will not stop my medication abruptly without first discussing it with my provider.    Staying Connected With Others  I will stay in touch with my friends, family members, and my primary care provider/team.    Use your imagination  Be creative.  We all have a creative side; it doesn t matter if it s oil painting, sand castles, or mud pies! This will also kick up the endorphins.    Witness Beauty  (AKA stop and smell the roses) Take a look outside, even in mid-winter. Notice colors, textures. Watch the squirrels and birds.     Service to others  Be of service to others.  There is always someone else in need.  By helping others we can  get out of ourselves  and remember the really important things.  This also provides opportunities for practicing all the other parts of the program.    Humor  Laugh and be silly!  Adjust your TV habits for less news and crime-drama and more comedy.    Control your stress  Try breathing deep, massage therapy, biofeedback, and meditation. Find time to relax each day.     My support system    Clinic Contact:  Phone number:    Contact 1:  Phone number:    Contact 2:  Phone number:    Zoroastrianism/:  Phone number:    Therapist:  Phone number:    Local crisis center:    Phone number:    Other community support:  Phone number:

## 2019-04-25 NOTE — PROGRESS NOTES
SUBJECTIVE:   CC: Kelley Serra is an 28 year old woman who presents for preventive health visit.   Answers for HPI/ROS submitted by the patient on 4/22/2019   Annual Exam:  Frequency of exercise:: 2-3 days/week  Getting at least 3 servings of Calcium per day:: Yes  Diet:: Vegetarian/vegan  Taking medications regularly:: Yes  Medication side effects:: None  Bi-annual eye exam:: Yes  Dental care twice a year:: Yes  Sleep apnea or symptoms of sleep apnea:: Daytime drowsiness  abdominal pain: No  Blood in stool: No  Blood in urine: No  chest pain: No  chills: No  congestion: No  constipation: No  cough: No  diarrhea: No  dizziness: No  ear pain: No  eye pain: No  nervous/anxious: No  fever: No  frequency: No  genital sores: No  headaches: No  hearing loss: No  heartburn: No  arthralgias: Yes  joint swelling: No  peripheral edema: No  mood changes: No  myalgias: Yes  nausea: No  dysuria: No  palpitations: No  Skin sensation changes: No  sore throat: No  urgency: No  rash: Yes  shortness of breath: No  visual disturbance: No  weakness: No  pelvic pain: Yes  vaginal bleeding: No  vaginal discharge: No  tenderness: No  breast mass: No  breast discharge: No  Additional concerns today:: Yes  Duration of exercise:: 30-45 minutes    *Rash - Started on top of left foot about 10 years ago, never bothered her so she was never seen for it. Now it has started to show up on the top of the right foot about 9 months ago. The rash will itch intermittently. Is not sure what could be causing the rash, but does remember having an allergic reaction to a medication around the time the original rash started 10 years ago.    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 Will have a neck or shoulder pain almost daily. Will start as neck pain that will radiate to upper back and shoulders, does feel that it is more on the left side. Yoga has helped in the past but does not seem as effective anymore.  Describes the pain as a sore muscle or like she pulled something. The only thing that seems to take the pain away is icy hot or will take ibuprofen if it gets really bad.     *Hemachromatosis concerns - Father and 2 PAunts recently found out that they have hemachromatosis, would like to be tested.    *Brain fog/Inability to focus - Has had trouble focusing over the past few years, has tried taking different supplements and doing different exercises with no improvement.     Today's PHQ-2 Score:   PHQ-2 ( 1999 Pf[employer]) 4/22/2019 1/30/2018   Q1: Little interest or pleasure in doing things 1 1   Q2: Feeling down, depressed or hopeless 0 0   PHQ-2 Score 1 1   Q1: Little interest or pleasure in doing things Several days -   Q2: Feeling down, depressed or hopeless Not at all -   PHQ-2 Score 1 -     Abuse: Current or Past(Physical, Sexual or Emotional)- No  Do you feel safe in your environment? Yes    Social History     Tobacco Use     Smoking status: Never Smoker     Smokeless tobacco: Never Used   Substance Use Topics     Alcohol use: Yes     Comment: Socially     If you drink alcohol do you typically have >3 drinks per day or >7 drinks per week? No                     Reviewed orders with patient.  Reviewed health maintenance and updated orders accordingly - Yes  Labs reviewed in EPIC    Mammogram not appropriate for this patient based on age.    Pertinent mammograms are reviewed under the imaging tab.  History of abnormal Pap smear: NO - age 30- 65 PAP every 3 years recommended  PAP / HPV 8/1/2016 2/8/2013 9/2/2011   PAP NIL NIL NIL     Reviewed and updated as needed this visit by clinical staff  Tobacco  Allergies  Meds  Med Hx  Surg Hx  Fam Hx  Soc Hx        Reviewed and updated as needed this visit by Provider  Tobacco  Allergies  Med Hx  Surg Hx  Fam Hx  Soc Hx           ROS:  CONSTITUTIONAL: NEGATIVE for fever, chills, change in weight  INTEGUMENTARY/SKIN: POSITIVE for red itchy rash on the left foot that  "started  12 years go   Would come and go and now is on the right foot. Has used hydrocortisone cream,  Different oils.   Is will occasionally itch. No pain. Heat will make the rash worse.  But generally the rash is a purple colored rash    EYES: NEGATIVE for vision changes or irritation - current with eye exam   ENT: NEGATIVE for ear, mouth and throat problems - allergies are acting.  Has tried Claritin ,  But it didn't seem to do anything so she isn't  Using that .    RESP: NEGATIVE for significant cough or SOB  BREAST: NEGATIVE for masses, tenderness or discharge  CV: NEGATIVE for chest pain, palpitations or peripheral edema  GI: NEGATIVE for nausea, abdominal pain, heartburn, or change in bowel habits.  : NEGATIVE for unusual urinary or vaginal symptoms. Periods are regular. - has appointment at the  of  in October will do pap smear then   MUSCULOSKELETAL:POSITIVE  for back pain upper back pain and neck pain.   Has tried yoga,  Stretching but that didn't help    NEURO: NEGATIVE for weakness, dizziness or paresthesias and POSITIVE for foggy headiness.  Has had this for a few years but seems to be getting worse.  Has tried drinking more water, exercise,  Supplements.  Will feel spacey,  Hard to focus. Memory has gotten worse.  Will need to write things down.  Will tell her  some thing and he will say that she has told him that 10 times but she has had memory issues for awhile and it does seem to be getting worse.   Does has family history of 80+ year old grandmother had sundowners.    Diet is good.  Eats clean.   The light headedness started before taking the prozac   PSYCHIATRIC: POSITIVE for anxiety, concentration difficulty, depressed mood     OBJECTIVE:   /68 (BP Location: Left arm, Patient Position: Chair, Cuff Size: Adult Regular)   Pulse 84   Temp 99.1  F (37.3  C) (Tympanic)   Resp 10   Ht 1.602 m (5' 3.07\")   Wt 51.6 kg (113 lb 12.8 oz)   LMP 04/10/2019 (Exact Date)   BMI 20.11 " kg/m    EXAM:  GENERAL: healthy, alert and no distress  EYES: Eyes grossly normal to inspection, PERRL and conjunctivae and sclerae normal  HENT: normal cephalic/atraumatic, ear canals and TM's normal, nasal mucosa edematous , rhinorrhea clear, oropharynx clear, oral mucous membranes moist and sinuses: not tender  NECK: no adenopathy, no asymmetry, masses, or scars and thyroid normal to palpation  RESP: lungs clear to auscultation - no rales, rhonchi or wheezes  BREAST: normal without masses, tenderness or nipple discharge and no palpable axillary masses or adenopathy  CV: regular rate and rhythm, normal S1 S2, no S3 or S4, no murmur, click or rub, no peripheral edema and peripheral pulses strong  ABDOMEN: soft, nontender, no hepatosplenomegaly, no masses and bowel sounds normal   (female): deferred  MS: Inspection: normal cervical lordosis  Tender:  left paracervical muscles, right paracervical muscles, left trapezius muscles, right trapezius muscles  Non-tender:  spinous processes, scapula, medial border of scapula, superior angle of scapula  Range of Motion:  flexion:  decreased, painful, extension: decreased, painful, left lateral bending: full, pain-free, right lateral bending: full, pain-free, left lateral rotation:  decreased, pain-free, right lateral rotation:  decreased, pain-free  Strength: Full strength of all neck muscles    Tender:  left parathoracic muscles, right parathoracic muscles  Non-tender:  thoracic spinous processes, thoracic facet joints  Range of Motion:  left lateral thoracic bending   full, pain-free, right lateral thoracic bending  decreased, pain-free, left thoracic rotation  full, pain-free, right thoracic rotation  full, pain-free  SKIN: top of both feet is purple and mildly erythemic .  No pustules, no open sores.   NEURO: Normal strength and tone, sensory exam grossly normal, mentation intact and oriented times 3,  Occasional did have some mild word searching .    PSYCH: mentation  appears normal, affect normal/bright  LYMPH: no cervical, supraclavicular, axillary, or inguinal adenopathy    pending    ASSESSMENT/PLAN:     ASSESSMENT/PLAN:      ICD-10-CM    1. Routine general medical examination at a health care facility Z00.00 **Comprehensive metabolic panel FUTURE 1yr   2. Memory changes R41.3 NEUROLOGY ADULT REFERRAL   3. Mild major depression (H) F32.0 DEPRESSION ACTION PLAN (DAP)   4. Vitamin D deficiency E55.9 **Vitamin D Deficiency FUTURE anytime   5. JOHN PAUL (generalized anxiety disorder) F41.1 FLUoxetine (PROZAC) 20 MG capsule   6. Forgetfulness R68.89 **Vitamin B12 FUTURE 2mo   7. Hot flashes R23.2 **Comprehensive metabolic panel FUTURE 1yr     **TSH with free T4 reflex FUTURE anytime   8. Hyperlipidemia LDL goal <160 E78.5 Lipid panel reflex to direct LDL Fasting   9. Discoloration of skin of foot L81.9 DERMATOLOGY REFERRAL     **CBC with platelets differential FUTURE 2mo     **Ferritin FUTURE 6mo     **Iron and iron binding capacity FUTURE 6mo   10. Upper back pain M54.9    11. Family history of carrier of genetic disease Z84.81 **CBC with platelets differential FUTURE 2mo     **Ferritin FUTURE 6mo     **Iron and iron binding capacity FUTURE 6mo       Patient Instructions     Preventive Health Recommendations  Female Ages 26 - 39  Yearly exam:   See your health care provider every year in order to    Review health changes.     Discuss preventive care.      Review your medicines if you your doctor has prescribed any.    Until age 30: Get a Pap test every three years (more often if you have had an abnormal result).    After age 30: Talk to your doctor about whether you should have a Pap test every 3 years or have a Pap test with HPV screening every 5 years.   You do not need a Pap test if your uterus was removed (hysterectomy) and you have not had cancer.  You should be tested each year for STDs (sexually transmitted diseases), if you're at risk.   Talk to your provider about how often to  "have your cholesterol checked.  If you are at risk for diabetes, you should have a diabetes test (fasting glucose).  Shots: Get a flu shot each year. Get a tetanus shot every 10 years.   Nutrition:     Eat at least 5 servings of fruits and vegetables each day.    Eat whole-grain bread, whole-wheat pasta and brown rice instead of white grains and rice.    Get adequate Calcium and Vitamin D.     Lifestyle    Exercise at least 150 minutes a week (30 minutes a day, 5 days of the week). This will help you control your weight and prevent disease.    Limit alcohol to one drink per day.    No smoking.     Wear sunscreen to prevent skin cancer.    See your dentist every six months for an exam and cleaning.      For the allergies  - irrigate the nose with saline spray.   Then follow with Flonase nasal spray     Check your  Work station to make sure that you are sitting ergonomically correct.     Do the neck and upper back exercises/.       For the skin changes see Derm     I did also order the lab tests to be done checking cholesterol,   Ferritin,  Iron,  Thyroid , CBC and complete metabolic profile   You will need to make a lab only appointment,  [phone].   Come in fasting       For the back and neck pain do the exercises given,   Watch you are sitting at the computer.   Don't let the computer screen suck you in.     Get up and walk  90 seconds every  90 min.   Set the timer on the oven.  - then you have to get up and turn it off.     See Neuro for the memory concerns.     See Derm for the rash.                   COUNSELING:   Reviewed preventive health counseling, as reflected in patient instructions       Regular exercise       Healthy diet/nutrition       Vision screening    BP Readings from Last 1 Encounters:   04/25/19 112/68     Estimated body mass index is 20.11 kg/m  as calculated from the following:    Height as of this encounter: 1.602 m (5' 3.07\").    Weight as of this encounter: 51.6 kg (113 lb 12.8 " oz).           reports that she has never smoked. She has never used smokeless tobacco.      Counseling Resources:  ATP IV Guidelines  Pooled Cohorts Equation Calculator  Breast Cancer Risk Calculator  FRAX Risk Assessment  ICSI Preventive Guidelines  Dietary Guidelines for Americans, 2010  USDA's MyPlate  ASA Prophylaxis  Lung CA Screening    MACIE BARRERA NP, APRN CNP  [facility]

## 2019-04-25 NOTE — PATIENT INSTRUCTIONS
Preventive Health Recommendations  Female Ages 26 - 39  Yearly exam:   See your health care provider every year in order to    Review health changes.     Discuss preventive care.      Review your medicines if you your doctor has prescribed any.    Until age 30: Get a Pap test every three years (more often if you have had an abnormal result).    After age 30: Talk to your doctor about whether you should have a Pap test every 3 years or have a Pap test with HPV screening every 5 years.   You do not need a Pap test if your uterus was removed (hysterectomy) and you have not had cancer.  You should be tested each year for STDs (sexually transmitted diseases), if you're at risk.   Talk to your provider about how often to have your cholesterol checked.  If you are at risk for diabetes, you should have a diabetes test (fasting glucose).  Shots: Get a flu shot each year. Get a tetanus shot every 10 years.   Nutrition:     Eat at least 5 servings of fruits and vegetables each day.    Eat whole-grain bread, whole-wheat pasta and brown rice instead of white grains and rice.    Get adequate Calcium and Vitamin D.     Lifestyle    Exercise at least 150 minutes a week (30 minutes a day, 5 days of the week). This will help you control your weight and prevent disease.    Limit alcohol to one drink per day.    No smoking.     Wear sunscreen to prevent skin cancer.    See your dentist every six months for an exam and cleaning.      For the allergies  - irrigate the nose with saline spray.   Then follow with Flonase nasal spray     Check your  Work station to make sure that you are sitting ergonomically correct.     Do the neck and upper back exercises/.       For the skin changes see Derm     I did also order the lab tests to be done checking cholesterol,   Ferritin,  Iron,  Thyroid , CBC and complete metabolic profile   You will need to make a lab only appointment,  [phone].   Come in fasting       For the back and neck pain  do the exercises given,   Watch you are sitting at the computer.   Don't let the computer screen suck you in.     Get up and walk  90 seconds every  90 min.   Set the timer on the oven.  - then you have to get up and turn it off.     See Neuro for the memory concerns.     See Derm for the rash.

## 2019-04-26 ASSESSMENT — ANXIETY QUESTIONNAIRES
3. WORRYING TOO MUCH ABOUT DIFFERENT THINGS: MORE THAN HALF THE DAYS
7. FEELING AFRAID AS IF SOMETHING AWFUL MIGHT HAPPEN: NOT AT ALL
GAD7 TOTAL SCORE: 3
2. NOT BEING ABLE TO STOP OR CONTROL WORRYING: NOT AT ALL
6. BECOMING EASILY ANNOYED OR IRRITABLE: NOT AT ALL
1. FEELING NERVOUS, ANXIOUS, OR ON EDGE: SEVERAL DAYS
5. BEING SO RESTLESS THAT IT IS HARD TO SIT STILL: NOT AT ALL

## 2019-04-26 ASSESSMENT — PATIENT HEALTH QUESTIONNAIRE - PHQ9
SUM OF ALL RESPONSES TO PHQ QUESTIONS 1-9: 4
5. POOR APPETITE OR OVEREATING: NOT AT ALL

## 2019-04-27 ASSESSMENT — ANXIETY QUESTIONNAIRES: GAD7 TOTAL SCORE: 3

## 2019-04-29 DIAGNOSIS — R68.89 FORGETFULNESS: ICD-10-CM

## 2019-04-29 DIAGNOSIS — Z00.00 ROUTINE GENERAL MEDICAL EXAMINATION AT A HEALTH CARE FACILITY: ICD-10-CM

## 2019-04-29 DIAGNOSIS — Z84.81 FAMILY HISTORY OF CARRIER OF GENETIC DISEASE: ICD-10-CM

## 2019-04-29 DIAGNOSIS — L81.9 DISCOLORATION OF SKIN OF FOOT: ICD-10-CM

## 2019-04-29 DIAGNOSIS — E78.5 HYPERLIPIDEMIA LDL GOAL <160: ICD-10-CM

## 2019-04-29 DIAGNOSIS — E55.9 VITAMIN D DEFICIENCY: ICD-10-CM

## 2019-04-29 DIAGNOSIS — R23.2 HOT FLASHES: ICD-10-CM

## 2019-04-29 LAB
ALBUMIN SERPL-MCNC: 4.3 G/DL (ref 3.4–5)
ALP SERPL-CCNC: 45 U/L (ref 40–150)
ALT SERPL W P-5'-P-CCNC: 12 U/L (ref 0–50)
ANION GAP SERPL CALCULATED.3IONS-SCNC: 5 MMOL/L (ref 3–14)
AST SERPL W P-5'-P-CCNC: 14 U/L (ref 0–45)
BASOPHILS # BLD AUTO: 0.1 10E9/L (ref 0–0.2)
BASOPHILS NFR BLD AUTO: 1 %
BILIRUB SERPL-MCNC: 0.7 MG/DL (ref 0.2–1.3)
BUN SERPL-MCNC: 12 MG/DL (ref 7–30)
CALCIUM SERPL-MCNC: 8.8 MG/DL (ref 8.5–10.1)
CHLORIDE SERPL-SCNC: 100 MMOL/L (ref 94–109)
CHOLEST SERPL-MCNC: 160 MG/DL
CO2 SERPL-SCNC: 29 MMOL/L (ref 20–32)
CREAT SERPL-MCNC: 0.75 MG/DL (ref 0.52–1.04)
DEPRECATED CALCIDIOL+CALCIFEROL SERPL-MC: 62 UG/L (ref 20–75)
DIFFERENTIAL METHOD BLD: NORMAL
EOSINOPHIL # BLD AUTO: 0.3 10E9/L (ref 0–0.7)
EOSINOPHIL NFR BLD AUTO: 6.3 %
ERYTHROCYTE [DISTWIDTH] IN BLOOD BY AUTOMATED COUNT: 13.2 % (ref 10–15)
FERRITIN SERPL-MCNC: 18 NG/ML (ref 12–150)
GFR SERPL CREATININE-BSD FRML MDRD: >90 ML/MIN/{1.73_M2}
GLUCOSE SERPL-MCNC: 89 MG/DL (ref 70–99)
HCT VFR BLD AUTO: 42.5 % (ref 35–47)
HDLC SERPL-MCNC: 72 MG/DL
HGB BLD-MCNC: 14.1 G/DL (ref 11.7–15.7)
IRON SATN MFR SERPL: 29 % (ref 15–46)
IRON SERPL-MCNC: 92 UG/DL (ref 35–180)
LDLC SERPL CALC-MCNC: 72 MG/DL
LYMPHOCYTES # BLD AUTO: 1.7 10E9/L (ref 0.8–5.3)
LYMPHOCYTES NFR BLD AUTO: 32.4 %
MCH RBC QN AUTO: 29.2 PG (ref 26.5–33)
MCHC RBC AUTO-ENTMCNC: 33.2 G/DL (ref 31.5–36.5)
MCV RBC AUTO: 88 FL (ref 78–100)
MONOCYTES # BLD AUTO: 0.6 10E9/L (ref 0–1.3)
MONOCYTES NFR BLD AUTO: 10.8 %
NEUTROPHILS # BLD AUTO: 2.5 10E9/L (ref 1.6–8.3)
NEUTROPHILS NFR BLD AUTO: 49.5 %
NONHDLC SERPL-MCNC: 88 MG/DL
PLATELET # BLD AUTO: 229 10E9/L (ref 150–450)
POTASSIUM SERPL-SCNC: 3.7 MMOL/L (ref 3.4–5.3)
PROT SERPL-MCNC: 7.5 G/DL (ref 6.8–8.8)
RBC # BLD AUTO: 4.83 10E12/L (ref 3.8–5.2)
SODIUM SERPL-SCNC: 134 MMOL/L (ref 133–144)
TIBC SERPL-MCNC: 313 UG/DL (ref 240–430)
TRIGL SERPL-MCNC: 79 MG/DL
TSH SERPL DL<=0.005 MIU/L-ACNC: 1.58 MU/L (ref 0.4–4)
VIT B12 SERPL-MCNC: 223 PG/ML (ref 193–986)
WBC # BLD AUTO: 5.1 10E9/L (ref 4–11)

## 2019-04-29 PROCEDURE — 82728 ASSAY OF FERRITIN: CPT | Performed by: NURSE PRACTITIONER

## 2019-04-29 PROCEDURE — 80053 COMPREHEN METABOLIC PANEL: CPT | Performed by: NURSE PRACTITIONER

## 2019-04-29 PROCEDURE — 85025 COMPLETE CBC W/AUTO DIFF WBC: CPT | Performed by: NURSE PRACTITIONER

## 2019-04-29 PROCEDURE — 80061 LIPID PANEL: CPT | Performed by: NURSE PRACTITIONER

## 2019-04-29 PROCEDURE — 83540 ASSAY OF IRON: CPT | Performed by: NURSE PRACTITIONER

## 2019-04-29 PROCEDURE — 36415 COLL VENOUS BLD VENIPUNCTURE: CPT | Performed by: NURSE PRACTITIONER

## 2019-04-29 PROCEDURE — 83550 IRON BINDING TEST: CPT | Performed by: NURSE PRACTITIONER

## 2019-04-29 PROCEDURE — 82607 VITAMIN B-12: CPT | Performed by: NURSE PRACTITIONER

## 2019-04-29 PROCEDURE — 82306 VITAMIN D 25 HYDROXY: CPT | Performed by: NURSE PRACTITIONER

## 2019-04-29 PROCEDURE — 84443 ASSAY THYROID STIM HORMONE: CPT | Performed by: NURSE PRACTITIONER

## 2019-05-29 ENCOUNTER — MYC MEDICAL ADVICE (OUTPATIENT)
Dept: FAMILY MEDICINE | Facility: CLINIC | Age: 29
End: 2019-05-29

## 2019-05-29 DIAGNOSIS — F32.0 MILD MAJOR DEPRESSION (H): Primary | ICD-10-CM

## 2019-05-29 DIAGNOSIS — F41.1 GAD (GENERALIZED ANXIETY DISORDER): ICD-10-CM

## 2019-07-15 ENCOUNTER — OFFICE VISIT (OUTPATIENT)
Dept: DERMATOLOGY | Facility: CLINIC | Age: 29
End: 2019-07-15
Payer: COMMERCIAL

## 2019-07-15 VITALS — DIASTOLIC BLOOD PRESSURE: 72 MMHG | SYSTOLIC BLOOD PRESSURE: 103 MMHG | HEART RATE: 85 BPM

## 2019-07-15 DIAGNOSIS — R21 RASH AND OTHER NONSPECIFIC SKIN ERUPTION: Primary | ICD-10-CM

## 2019-07-15 DIAGNOSIS — L29.9 LOCALIZED PRURITUS: ICD-10-CM

## 2019-07-15 PROCEDURE — 88305 TISSUE EXAM BY PATHOLOGIST: CPT | Mod: TC | Performed by: PHYSICIAN ASSISTANT

## 2019-07-15 PROCEDURE — 11104 PUNCH BX SKIN SINGLE LESION: CPT | Performed by: PHYSICIAN ASSISTANT

## 2019-07-15 PROCEDURE — 99243 OFF/OP CNSLTJ NEW/EST LOW 30: CPT | Mod: 25 | Performed by: PHYSICIAN ASSISTANT

## 2019-07-15 RX ORDER — FLUOCINONIDE 0.5 MG/G
CREAM TOPICAL
Qty: 30 G | Refills: 0 | Status: SHIPPED | OUTPATIENT
Start: 2019-07-15 | End: 2021-09-13

## 2019-07-15 NOTE — PROGRESS NOTES
"HPI:  I was asked to see pt by Tamica Durán APRN CNP. Kelley Serra is a 29 year old female patient here today for rash feet .  Patient states this has been present for 15 years.  Patient reports the following symptoms: itches at time, very rarely, discoloration of feet. Zoloft in 2005 caused rash to \"swell\" up .  Patient reports the following previous treatments: none.  Patient reports the following modifying factors: none.  Associated symptoms: none.  No family history of autoimmune disease. Patient has no other skin complaints today.  Remainder of the HPI, Meds, PMH, Allergies, FH, and SH was reviewed in chart.      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 for:  dog(+)/DM/M only and only on intradermals--ie, minimally allergic     Asthma, mild intermittent     last inhaler usage >6 years ago (4/11)     Cancer (H) 9/2015    Stage 1A ovarian cancer     Diagnostic skin and sensitization tests 2/26/14 skin tests pos. 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    treated with RSO       Past Surgical History:   Procedure Laterality Date     APPENDECTOMY  10/2/2015     BIOPSY  10/2/2015     COLONOSCOPY N/A 12/2/2015    Procedure: COLONOSCOPY;  Surgeon: Aaron Garrison MD;  Location:  GI     ESOPHAGOSCOPY, GASTROSCOPY, DUODENOSCOPY (EGD), COMBINED N/A 11/23/2015    Procedure: COMBINED ESOPHAGOSCOPY, GASTROSCOPY, DUODENOSCOPY (EGD), BIOPSY SINGLE OR MULTIPLE;  Surgeon: Babs Cassidy MD;  Location:  GI     GYN SURGERY  10/2/2015     LAPAROTOMY EXPLORATORY  10/2/15    Right salpingo-oophorectomy, omentectomy, appendectomy, lymph node biopsies, cancer staging      LAPAROTOMY, STAGING, COMBINED N/A 10/2/2015    Procedure: COMBINED LAPAROTOMY, STAGING;  " Surgeon: Duyen Thayer MD;  Location:  OR        Family History   Problem Relation Age of Onset     Diabetes Mother         gestational     Thyroid Disease Mother      Blood Disease Mother         anemia     Cancer - colorectal Mother 54     Colon Cancer Mother         Doing well     Cancer Maternal Grandmother      Blood Disease Maternal Grandmother         cervical     Respiratory Maternal Grandmother         emphysema     Diabetes Maternal Grandfather              Cerebrovascular Disease Maternal Grandfather      Cancer Maternal Grandfather         kidney     Breast Cancer Paternal Grandmother              Diabetes Paternal Grandfather              Cerebrovascular Disease Paternal Grandfather      Skin Cancer Paternal Grandfather      Heart Disease Father 56        had angioplasty     Coronary Artery Disease Father         Had angioplasty in 2014       Social History     Socioeconomic History     Marital status:      Spouse name: Not on file     Number of children: Not on file     Years of education: Not on file     Highest education level: Not on file   Occupational History     Not on file   Social Needs     Financial resource strain: Not on file     Food insecurity:     Worry: Not on file     Inability: Not on file     Transportation needs:     Medical: Not on file     Non-medical: Not on file   Tobacco Use     Smoking status: Never Smoker     Smokeless tobacco: Never Used   Substance and Sexual Activity     Alcohol use: Yes     Comment: Socially     Drug use: No     Sexual activity: Yes     Partners: Male     Birth control/protection: Male Surgical     Comment:  had vasectomy   Lifestyle     Physical activity:     Days per week: Not on file     Minutes per session: Not on file     Stress: Not on file   Relationships     Social connections:     Talks on phone: Not on file     Gets together: Not on file     Attends Religion service: Not on file     Active  member of club or organization: Not on file     Attends meetings of clubs or organizations: Not on file     Relationship status: Not on file     Intimate partner violence:     Fear of current or ex partner: Not on file     Emotionally abused: Not on file     Physically abused: Not on file     Forced sexual activity: Not on file   Other Topics Concern     Parent/sibling w/ CABG, MI or angioplasty before 65F 55M? No   Social History Narrative     Not on file       Outpatient Encounter Medications as of 7/15/2019   Medication Sig Dispense Refill     Cholecalciferol (VITAMIN D-3 PO)        FLUoxetine (PROZAC) 20 MG capsule Take 1 capsule (20 mg) by mouth daily Over due to be seen 90 capsule 1     MULTIPLE VITAMINS PO take  by mouth.       vortioxetine (TRINTELLIX/BRINTELLIX) 10 MG tablet Take 1 tablet (10 mg) by mouth daily 30 tablet 1     loratadine (CLARITIN) 10 MG tablet Take 1 tablet (10 mg) by mouth daily (Patient not taking: Reported on 4/25/2019) 30 tablet 1     No facility-administered encounter medications on file as of 7/15/2019.        Review Of Systems:  Skin: As above  Eyes: negative  Ears/Nose/Throat: negative  Respiratory: No shortness of breath, dyspnea on exertion, cough, or hemoptysis  Cardiovascular: negative  Gastrointestinal: negative  Genitourinary: negative  Musculoskeletal: negative  Neurologic: negative  Psychiatric: negative  Hematologic/Lymphatic/Immunologic: negative  Endocrine: negative      Objective:     /72   Pulse 85   Breastfeeding? No   Eyes: Conjunctivae/lids: Normal   ENT: Lips:  Normal  MSK: Normal  Cardiovascular: Peripheral edema none  Pulm: Breathing Normal  Neuro/Psych: Orientation: Normal; Mood/Affect: Normal, NAD, WDWN  Pt accompanied by: self  Following areas examined: face, neck, hands feet  [place] skin type:ii   Findings:  Pink/brown/purple smooth macules and papules on dorsal feet  Assessment and Plan:  1) Rash and nonspecific skin eruption  Vasculitis vs  capillaritis vs dermatitis and localized pruritis  PUNCH BIOPSY:  After consent, anesthesia with LEC and prep, punch biopasy with a 4mm punch performed. Ethilon 4.0 green microfilament suture used for closure. No complications and routine wound care.  May grow back and will get a scar. Based on lesion type may need to completely remove lesion. Patient will be notified in 7-10 days of results. Wound care directions given. All questions were answered. Cruciate stitch closure.     Apply a thin layer of lidex to affected area 2x a day for 2 weeks. Tapering with improvement. Do not use on face or body folds.  Discussed side effects of topical steroids including but not limited to atrophy (skin thinning), striae (stretch marks) telangiectasias, steroid acne, and others. Do not apply to normal skin. Do not apply to discolored skin that does not have rash present. Educated patient on post inflammatory hyperpigmentation.       Follow up in 3-4 weeks.

## 2019-07-15 NOTE — LETTER
"    7/15/2019         RE: Kelley Serra  [address]  [place] [address]        Dear Colleague,    Thank you for referring your patient, Kelley Serra, to the [facility]. Please see a copy of my visit note below.    HPI:  I was asked to see pt by Tamica TAYLOR CNP. Kelley Serra is a 29 year old female patient here today for rash feet .  Patient states this has been present for 15 years.  Patient reports the following symptoms: itches at time, very rarely, discoloration of feet. Zoloft in 2005 caused rash to \"swell\" up .  Patient reports the following previous treatments: none.  Patient reports the following modifying factors: none.  Associated symptoms: none.  No family history of autoimmune disease. Patient has no other skin complaints today.  Remainder of the HPI, Meds, PMH, Allergies, FH, and SH was reviewed in chart.      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 for:  dog(+)/DM/M only and only on intradermals--ie, minimally allergic     Asthma, mild intermittent     last inhaler usage >6 years ago (4/11)     Cancer (H) 9/2015    Stage 1A ovarian cancer     Diagnostic skin and sensitization tests 2/26/14 skin tests pos. 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    treated with RSO       Past Surgical History:   Procedure Laterality Date     APPENDECTOMY  10/2/2015     BIOPSY  10/2/2015     COLONOSCOPY N/A 12/2/2015    Procedure: COLONOSCOPY;  Surgeon: Aaron Garrison MD;  Location:  GI     ESOPHAGOSCOPY, GASTROSCOPY, DUODENOSCOPY (EGD), COMBINED N/A 11/23/2015    Procedure: COMBINED ESOPHAGOSCOPY, GASTROSCOPY, DUODENOSCOPY (EGD), BIOPSY SINGLE OR MULTIPLE;  Surgeon: Babs Cassidy MD;  Location: " [facility]     GYN SURGERY  10/2/2015     LAPAROTOMY EXPLORATORY  10/2/15    Right salpingo-oophorectomy, omentectomy, appendectomy, lymph node biopsies, cancer staging      LAPAROTOMY, STAGING, COMBINED N/A 10/2/2015    Procedure: COMBINED LAPAROTOMY, STAGING;  Surgeon: Duyen Thayer MD;  Location: [facility]        Family History   Problem Relation Age of Onset     Diabetes Mother         gestational     Thyroid Disease Mother      Blood Disease Mother         anemia     Cancer - colorectal Mother 54     Colon Cancer Mother         Doing well     Cancer Maternal Grandmother      Blood Disease Maternal Grandmother         cervical     Respiratory Maternal Grandmother         emphysema     Diabetes Maternal Grandfather              Cerebrovascular Disease Maternal Grandfather      Cancer Maternal Grandfather         kidney     Breast Cancer Paternal Grandmother              Diabetes Paternal Grandfather              Cerebrovascular Disease Paternal Grandfather      Skin Cancer Paternal Grandfather      Heart Disease Father 56        had angioplasty     Coronary Artery Disease Father         Had angioplasty in 2014       Social History     Socioeconomic History     Marital status:      Spouse name: Not on file     Number of children: Not on file     Years of education: Not on file     Highest education level: Not on file   Occupational History     Not on file   Social Needs     Financial resource strain: Not on file     Food insecurity:     Worry: Not on file     Inability: Not on file     Transportation needs:     Medical: Not on file     Non-medical: Not on file   Tobacco Use     Smoking status: Never Smoker     Smokeless tobacco: Never Used   Substance and Sexual Activity     Alcohol use: Yes     Comment: Socially     Drug use: No     Sexual activity: Yes     Partners: Male     Birth control/protection: Male Surgical     Comment:  had vasectomy   Lifestyle     Physical  activity:     Days per week: Not on file     Minutes per session: Not on file     Stress: Not on file   Relationships     Social connections:     Talks on phone: Not on file     Gets together: Not on file     Attends Gnosticism service: Not on file     Active member of club or organization: Not on file     Attends meetings of clubs or organizations: Not on file     Relationship status: Not on file     Intimate partner violence:     Fear of current or ex partner: Not on file     Emotionally abused: Not on file     Physically abused: Not on file     Forced sexual activity: Not on file   Other Topics Concern     Parent/sibling w/ CABG, MI or angioplasty before 65F 55M? No   Social History Narrative     Not on file       Outpatient Encounter Medications as of 7/15/2019   Medication Sig Dispense Refill     Cholecalciferol (VITAMIN D-3 PO)        FLUoxetine (PROZAC) 20 MG capsule Take 1 capsule (20 mg) by mouth daily Over due to be seen 90 capsule 1     MULTIPLE VITAMINS PO take  by mouth.       vortioxetine (TRINTELLIX/BRINTELLIX) 10 MG tablet Take 1 tablet (10 mg) by mouth daily 30 tablet 1     loratadine (CLARITIN) 10 MG tablet Take 1 tablet (10 mg) by mouth daily (Patient not taking: Reported on 4/25/2019) 30 tablet 1     No facility-administered encounter medications on file as of 7/15/2019.        Review Of Systems:  Skin: As above  Eyes: negative  Ears/Nose/Throat: negative  Respiratory: No shortness of breath, dyspnea on exertion, cough, or hemoptysis  Cardiovascular: negative  Gastrointestinal: negative  Genitourinary: negative  Musculoskeletal: negative  Neurologic: negative  Psychiatric: negative  Hematologic/Lymphatic/Immunologic: negative  Endocrine: negative      Objective:     /72   Pulse 85   Breastfeeding? No   Eyes: Conjunctivae/lids: Normal   ENT: Lips:  Normal  MSK: Normal  Cardiovascular: Peripheral edema none  Pulm: Breathing Normal  Neuro/Psych: Orientation: Normal; Mood/Affect: Normal, NAD,  WDWN  Pt accompanied by: self  Following areas examined: face, neck, hands feet  [place] skin type:ii   Findings:  Pink/brown/purple smooth macules and papules on dorsal feet  Assessment and Plan:  1) Rash and nonspecific skin eruption  Vasculitis vs capillaritis vs dermatitis and localized pruritis  PUNCH BIOPSY:  After consent, anesthesia with LEC and prep, punch biopasy with a 4mm punch performed. Ethilon 4.0 green microfilament suture used for closure. No complications and routine wound care.  May grow back and will get a scar. Based on lesion type may need to completely remove lesion. Patient will be notified in 7-10 days of results. Wound care directions given. All questions were answered. Cruciate stitch closure.     Apply a thin layer of lidex to affected area 2x a day for 2 weeks. Tapering with improvement. Do not use on face or body folds.  Discussed side effects of topical steroids including but not limited to atrophy (skin thinning), striae (stretch marks) telangiectasias, steroid acne, and others. Do not apply to normal skin. Do not apply to discolored skin that does not have rash present. Educated patient on post inflammatory hyperpigmentation.       Follow up in 3-4 weeks.       Again, thank you for allowing me to participate in the care of your patient.        Sincerely,        Romina Strauss PA-C

## 2019-07-15 NOTE — PATIENT INSTRUCTIONS
Apply a thin layer of lidex to affected area 2x a day for 2 weeks. Tapering with improvement. Do not use on face or body folds.  Discussed side effects of topical steroids including but not limited to atrophy (skin thinning), striae (stretch marks) telangiectasias, steroid acne, and others. Do not apply to normal skin. Do not apply to discolored skin that does not have rash present. Educated patient on post inflammatory hyperpigmentation.             Wound Care Instructions     FOR SUTURE CARE     [facility] [phone]  [facility] [phone]                 AFTER 24 HOURS YOU SHOULD REMOVE THE BANDAGE AND BEGIN DAILY DRESSING CHANGES AS FOLLOWS:     1) Remove Dressing.     2) Clean and dry the area with tap water using a Q-tip or sterile gauze pad.     3) Apply Vaseline, Aquaphor, Polysporin ointment or Bacitracin ointment over entire wound.  Do NOT use Neosporin ointment.     4) Cover the wound with a band-aid, or a sterile non-stick gauze pad and micropore paper tape      REPEAT THESE INSTRUCTIONS AT LEAST ONCE A DAY UNTIL THE WOUND HAS COMPLETELY HEALED.    RETURN TO CLINIC FOR A NURSE-ONLY SUTURE REMOVAL APPOINTMENT IN 7 TO 10 DAYS. UNLESS YOUR PROVIDER SPECIFIES OTHERWISE.     It is an old wives tale that a wound heals better when it is exposed to air and allowed to dry out. The wound will heal faster with a better cosmetic result if it is kept moist with ointment and covered with a bandage.    **Do not let the wound dry out.**      Supplies Needed:      *Cotton tipped applicators (Q-tips)    *Vaseline, Aquaphor, Polysporin Ointment or Bacitracin Ointment (NOT NEOSPORIN)    *Band-aids or non-stick gauze pads and micropore paper tape.          PATIENT INFORMATION:  During the healing process you will notice a number of changes. All wounds develop a small halo of redness surrounding the wound.  This means healing is occurring. Severe itching with extensive redness usually indicates  sensitivity to the ointment or bandage tape used to dress the wound.  You should call our office if this develops.      Swelling  and/or discoloration around your surgical site is common, particularly when performed around the eye.    After the wound is healed you may discontinue dressing changes.     You may experience a sensation of tightness as your wound heals. This is normal and will gradually subside.    Your healed wound may be sensitive to temperature changes. This sensitivity improves with time, but if you re having a lot of discomfort, try to avoid temperature extremes.    Patients frequently experience itching after their wound appears to have healed because of the continue healing under the skin.  Plain Vaseline will help relieve the itching.      POSSIBLE COMPLICATIONS    BLEEDIN. Leave the bandage in place.  2. Use tightly rolled up gauze or a cloth to apply direct pressure over the bandage for 30  minutes.  3. Reapply pressure for an additional 30 minutes if necessary  4. Use additional gauze and tape to maintain pressure once the bleeding has stopped.              Proper skin care from [facility]:    -Eliminate harsh soaps as they strip the natural oils from the skin, often resulting in dry itchy skin ( i.e. Dial, Zest, Ivis Spring)  -Use mild soaps such as Cetaphil or Dove Sensitive Skin in the shower. You do not need to use soap on arms, legs, and trunk every time you shower unless visibly soiled.   -Avoid hot or cold showers.  -After showering, lightly dry off and apply moisturizing within 2-3 minutes. This will help trap moisture in the skin.   -Aggressive use of a moisturizer at least 1-2 times a day to the entire body (including -Vanicream, Cetaphil, Aquaphor or Cerave) and moisturize hands after every washing.  -We recommend using moisturizers that come in a tub that needs to be scooped out, not a pump. This has more of an oil base. It will hold moisture in your skin much  better than a water base moisturizer. The above recommended are non-pore clogging.      Wear a sunscreen with at least SPF 30 on your face, ears, neck and V of the chest daily. Wear sunscreen on other areas of the body if those areas are exposed to the sun throughout the day. Sunscreens can contain physical and/or chemical blockers. Physical blockers are less likely to clog pores, these include zinc oxide and titanium dioxide. Reapply every two hour and after swimming. Sunscreen examples include Neutrogena, CeraVe, Blue Lizard, Elta MD and many others.    UV radiation  UVA radiation remains constant throughout the day and throughout the year. It is a longer wavelength than UVB and therefore penetrates deeper into the skin leading to immediate and delayed tanning, photoaging, and skin cancer. 70-80% of UVA and UVB radiation occurs between the hours of 10am-2pm.  UVB radiation  UVB radiation causes the most harmful effects and is more significant during the summer months. However, snow and ice can reflect UVB radiation leading to skin damage during the winter months as well. UVB radiation is responsible for tanning, burning, inflammation, delayed erythema (pinkness), pigmentation (brown spots), and skin cancer.

## 2019-07-22 ENCOUNTER — TELEPHONE (OUTPATIENT)
Dept: DERMATOLOGY | Facility: CLINIC | Age: 29
End: 2019-07-22

## 2019-07-22 LAB — COPATH REPORT: NORMAL

## 2019-07-22 NOTE — TELEPHONE ENCOUNTER
Called and spoke to patient. 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 Advised patient to continue Lidex BID until f/u appointment. Patient voiced understanding.    YAZ Damico-BSN  [facility]  [phone]

## 2019-07-22 NOTE — TELEPHONE ENCOUNTER
----- Message from Romina Strauss PA-C sent at 7/22/2019 12:41 PM CDT -----  Shave, right dorsal foot:   - Granuloma annulare ( benign inflammatory disorder of skin) unknown cause    Please continue lidex bid until your follow up.

## 2019-07-23 ENCOUNTER — ALLIED HEALTH/NURSE VISIT (OUTPATIENT)
Dept: DERMATOLOGY | Facility: CLINIC | Age: 29
End: 2019-07-23
Payer: COMMERCIAL

## 2019-07-23 DIAGNOSIS — Z48.01 ENCOUNTER FOR CHANGE OR REMOVAL OF SURGICAL WOUND DRESSING: Primary | ICD-10-CM

## 2019-07-23 PROCEDURE — 99207 ZZC NO CHARGE NURSE ONLY: CPT

## 2019-07-23 NOTE — NURSING NOTE
Pt returned to clinic for 1 week follow up suture removal. Pt has no complaints, denies pain. Sutures removed from left foot, area cleansed with normal saline. All sutures removed and ointment applied. Site is healing and wound edges approximating well.

## 2019-07-23 NOTE — PATIENT INSTRUCTIONS
Wound Care Instructions     FOR SUPERFICIAL WOUNDS     [facility] [phone]    [facility][1] [phone]                       AFTER 24 HOURS YOU SHOULD REMOVE THE BANDAGE AND BEGIN DAILY DRESSING CHANGES AS FOLLOWS:     1) Remove Dressing.     2) Clean and dry the area with tap water using a Q-tip or sterile gauze pad.     3) Apply Vaseline, Aquaphor, Polysporin ointment or Bacitracin ointment over entire wound.  Do NOT use Neosporin ointment.     4) Cover the wound with a band-aid, or a sterile non-stick gauze pad and micropore paper tape      REPEAT THESE INSTRUCTIONS AT LEAST ONCE A DAY UNTIL THE WOUND HAS COMPLETELY HEALED.    It is an old wives tale that a wound heals better when it is exposed to air and allowed to dry out. The wound will heal faster with a better cosmetic result if it is kept moist with ointment and covered with a bandage.    **Do not let the wound dry out.**      Supplies Needed:      *Cotton tipped applicators (Q-tips)    *Polysporin Ointment or Bacitracin Ointment (NOT NEOSPORIN)    *Band-aids or non-stick gauze pads and micropore paper tape.      PATIENT INFORMATION:    During the healing process you will notice a number of changes. All wounds develop a small halo of redness surrounding the wound.  This means healing is occurring. Severe itching with extensive redness usually indicates sensitivity to the ointment or bandage tape used to dress the wound.  You should call our office if this develops.      Swelling  and/or discoloration around your surgical site is common, particularly when performed around the eye.    All wounds normally drain.  The larger the wound the more drainage there will be.  After 7-10 days, you will notice the wound beginning to shrink and new skin will begin to grow.  The wound is healed when you can see skin has formed over the entire area.  A healed wound has a healthy, shiny look to the surface and is red to dark pink in color  to normalize.  Wounds may take approximately 4-6 weeks to heal.  Larger wounds may take 6-8 weeks.  After the wound is healed you may discontinue dressing changes.    You may experience a sensation of tightness as your wound heals. This is normal and will gradually subside.    Your healed wound may be sensitive to temperature changes. This sensitivity improves with time, but if you re having a lot of discomfort, try to avoid temperature extremes.    Patients frequently experience itching after their wound appears to have healed because of the continue healing under the skin.  Plain Vaseline will help relieve the itching.        POSSIBLE COMPLICATIONS    BLEEDIN. Leave the bandage in place.  2. Use tightly rolled up gauze or a cloth to apply direct pressure over the bandage for 30  minutes.  3. Reapply pressure for an additional 30 minutes if necessary  4. Use additional gauze and tape to maintain pressure once the bleeding has stopped.

## 2019-09-13 ENCOUNTER — TELEPHONE (OUTPATIENT)
Dept: ONCOLOGY | Facility: CLINIC | Age: 29
End: 2019-09-13

## 2019-09-13 NOTE — TELEPHONE ENCOUNTER
Called pt today to reschedule with Myra Gifford due to Myra is not available on the date/time original appt was made.  I left a msg for the pt that I have rescheduled labs and Myra appt from 10/25/19 to 10/30/19.  Also left scheduling number to call if the rescheduled date/time doesn't work for pt.  Also mailed out.

## 2019-10-18 ASSESSMENT — ENCOUNTER SYMPTOMS: BREAST PAIN: 1

## 2019-10-21 ENCOUNTER — ANCILLARY PROCEDURE (OUTPATIENT)
Dept: ULTRASOUND IMAGING | Facility: CLINIC | Age: 29
End: 2019-10-21
Attending: NURSE PRACTITIONER
Payer: COMMERCIAL

## 2019-10-21 DIAGNOSIS — D39.11 OVARIAN TUMOR OF BORDERLINE MALIGNANCY, RIGHT: ICD-10-CM

## 2019-10-21 PROCEDURE — 76856 US EXAM PELVIC COMPLETE: CPT | Mod: 59

## 2019-10-21 PROCEDURE — 76830 TRANSVAGINAL US NON-OB: CPT

## 2019-10-30 ENCOUNTER — ONCOLOGY VISIT (OUTPATIENT)
Dept: ONCOLOGY | Facility: CLINIC | Age: 29
End: 2019-10-30
Attending: NURSE PRACTITIONER
Payer: COMMERCIAL

## 2019-10-30 ENCOUNTER — APPOINTMENT (OUTPATIENT)
Dept: LAB | Facility: CLINIC | Age: 29
End: 2019-10-30
Attending: NURSE PRACTITIONER
Payer: COMMERCIAL

## 2019-10-30 VITALS
RESPIRATION RATE: 18 BRPM | WEIGHT: 119.2 LBS | OXYGEN SATURATION: 98 % | HEART RATE: 81 BPM | BODY MASS INDEX: 21.07 KG/M2 | SYSTOLIC BLOOD PRESSURE: 113 MMHG | TEMPERATURE: 98.2 F | DIASTOLIC BLOOD PRESSURE: 72 MMHG

## 2019-10-30 DIAGNOSIS — N83.202 CYST OF LEFT OVARY: ICD-10-CM

## 2019-10-30 DIAGNOSIS — D39.11 OVARIAN TUMOR OF BORDERLINE MALIGNANCY, RIGHT: Primary | ICD-10-CM

## 2019-10-30 DIAGNOSIS — Z12.4 SCREENING FOR MALIGNANT NEOPLASM OF CERVIX: ICD-10-CM

## 2019-10-30 LAB — CANCER AG125 SERPL-ACNC: 10 U/ML (ref 0–30)

## 2019-10-30 PROCEDURE — 87624 HPV HI-RISK TYP POOLED RSLT: CPT | Performed by: NURSE PRACTITIONER

## 2019-10-30 PROCEDURE — 86304 IMMUNOASSAY TUMOR CA 125: CPT | Performed by: NURSE PRACTITIONER

## 2019-10-30 PROCEDURE — G0463 HOSPITAL OUTPT CLINIC VISIT: HCPCS | Mod: ZF

## 2019-10-30 PROCEDURE — 99213 OFFICE O/P EST LOW 20 MIN: CPT | Mod: ZP | Performed by: NURSE PRACTITIONER

## 2019-10-30 PROCEDURE — 36415 COLL VENOUS BLD VENIPUNCTURE: CPT

## 2019-10-30 PROCEDURE — G0145 SCR C/V CYTO,THINLAYER,RESCR: HCPCS | Performed by: NURSE PRACTITIONER

## 2019-10-30 ASSESSMENT — PAIN SCALES - GENERAL: PAINLEVEL: NO PAIN (0)

## 2019-10-30 NOTE — PROGRESS NOTES
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 She completed treatment on 10/2/15 with an exploratory laparotomy, RSO, and fertility sparing staging. She presents today for an annual surveillance visit.    HPI: Oscar comes to the clinic feeling well and is accompanied by her  Nik. Continues with pelvic pain with her periods- stopped pelvic PT which had been helpful, takes ibuprofen as needed. Reports her periods are normal for her, does have generalized breast tenderness with her periods, equal bilaterally, resolves after menses. No other breast concerns. Sexually active, no dyspareunia- using condoms for pregnancy prevention, no current plans for pregnancy. Up to date on visits with her PCP. She declines an influenza vaccine today, plans to consider this.      Brief Oncology History:  9/29/15: Abdominal pain, 30cm pelvic mass.  73.  10/2/15: Sent to OR for ex lap, RSO, and fertility sparing staging. 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 Elected observation at this time.  16:  13  16:  9, Pap NIL  10/31/16:  10  17:  13  17:  10  17:  10  10/27/17:  10  10/28/17: Pelvic ultrasound:  IMPRESSION:   1. Normal sonographic evaluation of the uterus and left ovary.  2. Trace free fluid in the pelvis which may be physiologic.   18:  9  10/20/18: Pelvic US impression:  Right ovary is surgically absent. Uterus, endometrial  stripe and left ovary are unremarkable. Small amount of free pelvic  fluid is likely physiologic.    10/21/19: Pelvic US:  IMPRESSION:  1. 1.6 cm complex left ovarian cyst.  2. Otherwise unremarkable pelvis post right oophorectomy.    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 for:  dog(+)/DM/M only and only on intradermals--ie, minimally allergic     Asthma, mild intermittent     last inhaler usage >6 years ago (4/11)     Cancer (H) 9/2015    Stage 1A ovarian cancer     Diagnostic skin and sensitization tests 2/26/14 skin tests pos. 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    treated with RSO       Past Surgical History:   Procedure Laterality Date     APPENDECTOMY  10/2/2015     BIOPSY  10/2/2015     COLONOSCOPY N/A 12/2/2015    Procedure: COLONOSCOPY;  Surgeon: Aaron Garrison MD;  Location: [facility]     ESOPHAGOSCOPY, GASTROSCOPY, DUODENOSCOPY (EGD), COMBINED N/A 11/23/2015    Procedure: COMBINED ESOPHAGOSCOPY, GASTROSCOPY, DUODENOSCOPY (EGD), BIOPSY SINGLE OR MULTIPLE;  Surgeon: Babs Cassidy MD;  Location:  GI     GYN SURGERY  10/2/2015     LAPAROTOMY EXPLORATORY  10/2/15    Right salpingo-oophorectomy, omentectomy, appendectomy, lymph node biopsies, cancer staging      LAPAROTOMY, STAGING, COMBINED N/A 10/2/2015    Procedure: COMBINED LAPAROTOMY, STAGING;  Surgeon: Duyen Thayer MD;  Location:  OR       Social History     Socioeconomic History     Marital status:      Spouse name: Not on file     Number of children: Not on file     Years of education: Not on file     Highest education level: Not on file   Occupational History     Not on file   Social Needs     Financial resource strain: Not on file     Food insecurity:     Worry: Not on file     Inability: Not on file     Transportation needs:     Medical: Not on file     Non-medical: Not on file   Tobacco Use     Smoking status: Never Smoker     Smokeless tobacco: Never Used   Substance and Sexual Activity     Alcohol use:  Yes     Comment: Socially     Drug use: No     Sexual activity: Yes     Partners: Male     Birth control/protection: Male Surgical     Comment:  had vasectomy   Lifestyle     Physical activity:     Days per week: Not on file     Minutes per session: Not on file     Stress: Not on file   Relationships     Social connections:     Talks on phone: Not on file     Gets together: Not on file     Attends Gnosticism service: Not on file     Active member of club or organization: Not on file     Attends meetings of clubs or organizations: Not on file     Relationship status: Not on file     Intimate partner violence:     Fear of current or ex partner: Not on file     Emotionally abused: Not on file     Physically abused: Not on file     Forced sexual activity: Not on file   Other Topics Concern     Parent/sibling w/ CABG, MI or angioplasty before 65F 55M? No   Social History Narrative     Not on file       Family History   Problem Relation Age of Onset     Diabetes Mother         gestational     Thyroid Disease Mother      Blood Disease Mother         anemia     Cancer - colorectal Mother 54     Colon Cancer Mother         Doing well     Cancer Maternal Grandmother      Blood Disease Maternal Grandmother         cervical     Respiratory Maternal Grandmother         emphysema     Diabetes Maternal Grandfather              Cerebrovascular Disease Maternal Grandfather      Cancer Maternal Grandfather         kidney     Breast Cancer Paternal Grandmother              Diabetes Paternal Grandfather              Cerebrovascular Disease Paternal Grandfather      Skin Cancer Paternal Grandfather      Heart Disease Father 56        had angioplasty     Coronary Artery Disease Father         Had angioplasty in 2014       Current Outpatient Medications   Medication     Cholecalciferol (VITAMIN D-3 PO)     fluocinonide (LIDEX) 0.05 % external cream     FLUoxetine (PROZAC) 20 MG capsule     loratadine  (CLARITIN) 10 MG tablet     MULTIPLE VITAMINS PO     No current facility-administered medications for this visit.           Allergies   Allergen Reactions     Cleocin Rash     Keflex [Cephalexin Monohydrate] Rash     Zoloft        Review of Systems   Answers for HPI/ROS submitted by the patient on 10/18/2019   General Symptoms: No  Skin Symptoms: No  HENT Symptoms: No  EYE SYMPTOMS: No  HEART SYMPTOMS: No  LUNG SYMPTOMS: No  INTESTINAL SYMPTOMS: No  URINARY SYMPTOMS: No  GYNECOLOGIC SYMPTOMS: No  BREAST SYMPTOMS: Yes  SKELETAL SYMPTOMS: No  BLOOD SYMPTOMS: No  NERVOUS SYSTEM SYMPTOMS: No  MENTAL HEALTH SYMPTOMS: No  Pain: Yes    I have reviewed the patient's ROS and discussed all pertinent information as noted in the HPI.      OBJECTIVE:    Physical Exam:    /72   Pulse 81   Temp 98.2  F (36.8  C) (Oral)   Resp 18   Wt 54.1 kg (119 lb 3.2 oz)   LMP 10/23/2019 (Exact Date)   SpO2 98%   Breastfeeding? No   BMI 21.07 kg/m      CONSTITUTIONAL: Alert non-toxic appearing female in no acute distress  NECK: Neck supple without lymphadenopathy  RESPIRATORY: Lungs clear to auscultation, no increased work of breathing noted  CV: Regular rate and rhythm, S1S2, no clicks, murmurs, rubs, or gallops; bilateral lower extremities without edema, dorsalis pedis pulses 2+ bilaterally  GASTROINTESTINAL: Normoactive bowel sounds x4 quadrants, abdomen soft, non-distended, and non-tender to palpation without masses or organomegaly  GENITOURINARY: External genitalia and urethral meatus pink without lesions, masses, or excoriation. Vagina with estrogenic effect, normal rugae, no masses or lesions. Cervix without masses or lesions. Bimanual exam reveals no masses, fullness, or cervical motion tenderness. Rectovaginal exam confirms these findings.  LYMPHATIC: Cervical, supraclavicular, and inguinal nodes without lymphadenopathy  MUSCULOSKELETAL: Moves all extremities, no obvious muscle wasting  NEUROLOGIC: No gross deficits,  normal gait  SKIN: Appropriate color for race, warm and dry, no rashes or lesions to unclothed skin  PSYCHIATRIC: Pleasant and interactive, affect bright, makes appropriate eye contact, thought process linear    Data:   pending  Pelvic US reviewed    Assessment/Plan:  1) History of borderline mucinous tumor of the right ovary: H&P without abnormal findings,  pending. Pelvic US demonstrated 1.6cm complex left ovarian cyst- per Dr. Jeronimo, repeat US in six weeks. If it has resolved at that time, she will return in one year for surveillance with pelvic ultrasound per updated recommendations per Solomon et al. (2017). Reviewed signs and symptoms  of recurrence including but not limited to bleeding from vagina, bladder, or rectum, bloating, early satiety, swelling in the lower extremities, abdominal or lower back pain, changes in bowel or bladder patterns, shortness of breath, increased fatigue, unexplained weight loss, and night sweats. Reviewed signs and symptoms for when she should contact the clinic or seek additional care. Patient to contact the clinic with any questions or concerns in the interim.   3) Genetic testing: Risk factors have been assessed and the patient does not meet qualifications for genetic counseling based on NCCN guidelines.   4) Labs:  and pelvic ultrasound  5) Health maintenance issues discussed today include to follow up with PCP for co-morbid conditions and non-gynecologic issues. Pap/HPV done today. Declines influenza vaccine today.  6) Patient verbalized understanding of and agreement with plan.    A total of 20 minutes of face to face time spent with patient, over 50% of which was spent in counseling and coordination of care.    CLAUDIA Chandler, FNP-C  Gynecologic Oncology  [facility]  Pager: [phone]

## 2019-10-30 NOTE — NURSING NOTE
"Oncology Rooming Note    October 30, 2019 2:53 PM   Kelley Serra is a 29 year old female who presents for:    Chief Complaint   Patient presents with     Blood Draw     VPT blood draw and vitals     Oncology Clinic Visit     Return Ovarian Ca     Initial Vitals: /72   Pulse 81   Temp 98.2  F (36.8  C) (Oral)   Resp 18   Wt 54.1 kg (119 lb 3.2 oz)   LMP 10/23/2019 (Exact Date)   SpO2 98%   Breastfeeding? No   BMI 21.07 kg/m   Estimated body mass index is 21.07 kg/m  as calculated from the following:    Height as of 4/25/19: 1.602 m (5' 3.07\").    Weight as of this encounter: 54.1 kg (119 lb 3.2 oz). Body surface area is 1.55 meters squared.  No Pain (0) Comment: Data Unavailable   Patient's last menstrual period was 10/23/2019 (exact date).  Allergies reviewed: Yes  Medications reviewed: Yes    Medications: Medication refills not needed today.  Pharmacy name entered into [facility]: [id number] - [place] [id number] [place] [id number] & [facility]    Clinical concerns: Breast tenderness pre menstrual;       Fabiola Mendoza CMA              "

## 2019-10-30 NOTE — LETTER
10/30/2019       RE: Kelley Serra   [facility] [address]     Dear Colleague,    Thank you for referring your patient, Kelley Serra, to the [facility]. Please see a copy of my visit note below.    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 She completed treatment on 10/2/15 with an exploratory laparotomy, RSO, and fertility sparing staging. She presents today for an annual surveillance visit.    HPI: Oscar comes to the clinic feeling well and is accompanied by her  Nik. Continues with pelvic pain with her periods- stopped pelvic PT which had been helpful, takes ibuprofen as needed. Reports her periods are normal for her, does have generalized breast tenderness with her periods, equal bilaterally, resolves after menses. No other breast concerns. Sexually active, no dyspareunia- using condoms for pregnancy prevention, no current plans for pregnancy. Up to date on visits with her PCP. She declines an influenza vaccine today, plans to consider this.      Brief Oncology History:  9/29/15: Abdominal pain, 30cm pelvic mass.  73.  10/2/15: Sent to OR for ex lap, RSO, and fertility sparing staging. 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 Elected observation at this time.  16:  13  16:  9, Pap NIL  10/31/16:  10  17:  13  17:  10  17:  10  10/27/17:  10  10/28/17: Pelvic ultrasound:  IMPRESSION:   1. Normal sonographic evaluation of the uterus and left ovary.  2. Trace free fluid in the pelvis which may be physiologic.   18:  9  10/20/18: Pelvic US impression:  Right ovary is surgically absent. Uterus, endometrial  stripe and left ovary are unremarkable. Small  amount of free pelvic  fluid is likely physiologic.    10/21/19: Pelvic US:  IMPRESSION:  1. 1.6 cm complex left ovarian cyst.  2. Otherwise unremarkable pelvis post right oophorectomy.    10/30/19:  pending, Pap/HPV pending    Past Medical History:   Diagnosis Date     Allergic rhinitis due to animal dander      Allergy to mold spores     2/26/14 skin tests pos. for:  dog(+)/DM/M only and only on intradermals--ie, minimally allergic     Asthma, mild intermittent     last inhaler usage >6 years ago (4/11)     Cancer (H) 9/2015    Stage 1A ovarian cancer     Diagnostic skin and sensitization tests 2/26/14 skin tests pos. 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    treated with RSO       Past Surgical History:   Procedure Laterality Date     APPENDECTOMY  10/2/2015     BIOPSY  10/2/2015     COLONOSCOPY N/A 12/2/2015    Procedure: COLONOSCOPY;  Surgeon: Aaron Garrison MD;  Location:  GI     ESOPHAGOSCOPY, GASTROSCOPY, DUODENOSCOPY (EGD), COMBINED N/A 11/23/2015    Procedure: COMBINED ESOPHAGOSCOPY, GASTROSCOPY, DUODENOSCOPY (EGD), BIOPSY SINGLE OR MULTIPLE;  Surgeon: Babs Cassidy MD;  Location:  GI     GYN SURGERY  10/2/2015     LAPAROTOMY EXPLORATORY  10/2/15    Right salpingo-oophorectomy, omentectomy, appendectomy, lymph node biopsies, cancer staging      LAPAROTOMY, STAGING, COMBINED N/A 10/2/2015    Procedure: COMBINED LAPAROTOMY, STAGING;  Surgeon: Duyen Thayer MD;  Location:  OR       Social History     Socioeconomic History     Marital status:      Spouse name: Not on file     Number of children: Not on file     Years of education: Not on file     Highest education level: Not on file   Occupational History     Not on file   Social Needs     Financial resource strain: Not on file     Food insecurity:     Worry: Not  on file     Inability: Not on file     Transportation needs:     Medical: Not on file     Non-medical: Not on file   Tobacco Use     Smoking status: Never Smoker     Smokeless tobacco: Never Used   Substance and Sexual Activity     Alcohol use: Yes     Comment: Socially     Drug use: No     Sexual activity: Yes     Partners: Male     Birth control/protection: Male Surgical     Comment:  had vasectomy   Lifestyle     Physical activity:     Days per week: Not on file     Minutes per session: Not on file     Stress: Not on file   Relationships     Social connections:     Talks on phone: Not on file     Gets together: Not on file     Attends Restorationist service: Not on file     Active member of club or organization: Not on file     Attends meetings of clubs or organizations: Not on file     Relationship status: Not on file     Intimate partner violence:     Fear of current or ex partner: Not on file     Emotionally abused: Not on file     Physically abused: Not on file     Forced sexual activity: Not on file   Other Topics Concern     Parent/sibling w/ CABG, MI or angioplasty before 65F 55M? No   Social History Narrative     Not on file       Family History   Problem Relation Age of Onset     Diabetes Mother         gestational     Thyroid Disease Mother      Blood Disease Mother         anemia     Cancer - colorectal Mother 54     Colon Cancer Mother         Doing well     Cancer Maternal Grandmother      Blood Disease Maternal Grandmother         cervical     Respiratory Maternal Grandmother         emphysema     Diabetes Maternal Grandfather              Cerebrovascular Disease Maternal Grandfather      Cancer Maternal Grandfather         kidney     Breast Cancer Paternal Grandmother              Diabetes Paternal Grandfather              Cerebrovascular Disease Paternal Grandfather      Skin Cancer Paternal Grandfather      Heart Disease Father 56        had angioplasty     Coronary  Artery Disease Father         Had angioplasty in Feb. 2014       Current Outpatient Medications   Medication     Cholecalciferol (VITAMIN D-3 PO)     fluocinonide (LIDEX) 0.05 % external cream     FLUoxetine (PROZAC) 20 MG capsule     loratadine (CLARITIN) 10 MG tablet     MULTIPLE VITAMINS PO     No current facility-administered medications for this visit.           Allergies   Allergen Reactions     Cleocin Rash     Keflex [Cephalexin Monohydrate] Rash     Zoloft        Review of Systems   Answers for HPI/ROS submitted by the patient on 10/18/2019   General Symptoms: No  Skin Symptoms: No  HENT Symptoms: No  EYE SYMPTOMS: No  HEART SYMPTOMS: No  LUNG SYMPTOMS: No  INTESTINAL SYMPTOMS: No  URINARY SYMPTOMS: No  GYNECOLOGIC SYMPTOMS: No  BREAST SYMPTOMS: Yes  SKELETAL SYMPTOMS: No  BLOOD SYMPTOMS: No  NERVOUS SYSTEM SYMPTOMS: No  MENTAL HEALTH SYMPTOMS: No  Pain: Yes    I have reviewed the patient's ROS and discussed all pertinent information as noted in the HPI.      OBJECTIVE:    Physical Exam:    /72   Pulse 81   Temp 98.2  F (36.8  C) (Oral)   Resp 18   Wt 54.1 kg (119 lb 3.2 oz)   LMP 10/23/2019 (Exact Date)   SpO2 98%   Breastfeeding? No   BMI 21.07 kg/m       CONSTITUTIONAL: Alert non-toxic appearing female in no acute distress  NECK: Neck supple without lymphadenopathy  RESPIRATORY: Lungs clear to auscultation, no increased work of breathing noted  CV: Regular rate and rhythm, S1S2, no clicks, murmurs, rubs, or gallops; bilateral lower extremities without edema, dorsalis pedis pulses 2+ bilaterally  GASTROINTESTINAL: Normoactive bowel sounds x4 quadrants, abdomen soft, non-distended, and non-tender to palpation without masses or organomegaly  GENITOURINARY: External genitalia and urethral meatus pink without lesions, masses, or excoriation. Vagina with estrogenic effect, normal rugae, no masses or lesions. Cervix without masses or lesions. Bimanual exam reveals no masses, fullness, or cervical  motion tenderness. Rectovaginal exam confirms these findings.  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 Pelvic US demonstrated 1.6cm complex left ovarian cyst- per Dr. Jeronimo, repeat US in six weeks. If it has resolved at that time, she will return in one year for surveillance with pelvic ultrasound per updated recommendations per Solomon et al. (2017). Reviewed signs and symptoms  of recurrence including but not limited to bleeding from vagina, bladder, or rectum, bloating, early satiety, swelling in the lower extremities, abdominal or lower back pain, changes in bowel or bladder patterns, shortness of breath, increased fatigue, unexplained weight loss, and night sweats. Reviewed signs and symptoms for when she should contact the clinic or seek additional care. Patient to contact the clinic with any questions or concerns in the interim.   3) Genetic testing: Risk factors have been assessed and the patient does not meet qualifications for genetic counseling based on NCCN guidelines.   4) Labs:  and pelvic ultrasound  5) Health maintenance issues discussed today include to follow up with PCP for co-morbid conditions and non-gynecologic issues. Pap/HPV done today. Declines influenza vaccine today.  6) Patient verbalized understanding of and agreement with plan.    A total of 20 minutes of face to face time spent with patient, over 50% of which was spent in counseling and coordination of  care.    CLAUDIA Chandler, FNP-C  Gynecologic Oncology  [facility]  Pager: [phone]

## 2019-11-01 LAB
COPATH REPORT: NORMAL
PAP: NORMAL

## 2019-11-02 ENCOUNTER — HEALTH MAINTENANCE LETTER (OUTPATIENT)
Age: 29
End: 2019-11-02

## 2019-11-05 LAB
FINAL DIAGNOSIS: NORMAL
HPV HR 12 DNA CVX QL NAA+PROBE: NEGATIVE
HPV16 DNA SPEC QL NAA+PROBE: NEGATIVE
HPV18 DNA SPEC QL NAA+PROBE: NEGATIVE
SPECIMEN DESCRIPTION: NORMAL
SPECIMEN SOURCE CVX/VAG CYTO: NORMAL

## 2019-12-02 ENCOUNTER — ANCILLARY PROCEDURE (OUTPATIENT)
Dept: ULTRASOUND IMAGING | Facility: CLINIC | Age: 29
End: 2019-12-02
Attending: NURSE PRACTITIONER
Payer: COMMERCIAL

## 2019-12-02 DIAGNOSIS — N83.202 CYST OF LEFT OVARY: ICD-10-CM

## 2019-12-02 PROCEDURE — 76856 US EXAM PELVIC COMPLETE: CPT | Mod: 59

## 2019-12-02 PROCEDURE — 76830 TRANSVAGINAL US NON-OB: CPT

## 2020-08-05 DIAGNOSIS — D39.11 OVARIAN TUMOR OF BORDERLINE MALIGNANCY, RIGHT: Primary | ICD-10-CM

## 2020-10-06 ENCOUNTER — OFFICE VISIT (OUTPATIENT)
Dept: FAMILY MEDICINE | Facility: CLINIC | Age: 30
End: 2020-10-06
Payer: COMMERCIAL

## 2020-10-06 VITALS
TEMPERATURE: 98.5 F | HEIGHT: 63 IN | WEIGHT: 115 LBS | RESPIRATION RATE: 16 BRPM | OXYGEN SATURATION: 100 % | DIASTOLIC BLOOD PRESSURE: 74 MMHG | HEART RATE: 105 BPM | SYSTOLIC BLOOD PRESSURE: 113 MMHG | BODY MASS INDEX: 20.38 KG/M2

## 2020-10-06 DIAGNOSIS — R53.83 FATIGUE, UNSPECIFIED TYPE: Primary | ICD-10-CM

## 2020-10-06 DIAGNOSIS — R41.89 BRAIN FOG: ICD-10-CM

## 2020-10-06 DIAGNOSIS — H02.89 DISORDER OF EYELASHES: ICD-10-CM

## 2020-10-06 PROBLEM — F32.0 MILD MAJOR DEPRESSION (H): Status: RESOLVED | Noted: 2018-09-17 | Resolved: 2020-10-06

## 2020-10-06 LAB
ERYTHROCYTE [DISTWIDTH] IN BLOOD BY AUTOMATED COUNT: 13.6 % (ref 10–15)
HCT VFR BLD AUTO: 41.9 % (ref 35–47)
HGB BLD-MCNC: 13.7 G/DL (ref 11.7–15.7)
MCH RBC QN AUTO: 28.5 PG (ref 26.5–33)
MCHC RBC AUTO-ENTMCNC: 32.7 G/DL (ref 31.5–36.5)
MCV RBC AUTO: 87 FL (ref 78–100)
PLATELET # BLD AUTO: 230 10E9/L (ref 150–450)
RBC # BLD AUTO: 4.8 10E12/L (ref 3.8–5.2)
T3FREE SERPL-MCNC: 3.4 PG/ML (ref 2.3–4.2)
WBC # BLD AUTO: 7.1 10E9/L (ref 4–11)

## 2020-10-06 PROCEDURE — 84439 ASSAY OF FREE THYROXINE: CPT | Performed by: FAMILY MEDICINE

## 2020-10-06 PROCEDURE — 99214 OFFICE O/P EST MOD 30 MIN: CPT | Performed by: FAMILY MEDICINE

## 2020-10-06 PROCEDURE — 85027 COMPLETE CBC AUTOMATED: CPT | Performed by: FAMILY MEDICINE

## 2020-10-06 PROCEDURE — 86800 THYROGLOBULIN ANTIBODY: CPT | Performed by: FAMILY MEDICINE

## 2020-10-06 PROCEDURE — 84443 ASSAY THYROID STIM HORMONE: CPT | Performed by: FAMILY MEDICINE

## 2020-10-06 PROCEDURE — 84481 FREE ASSAY (FT-3): CPT | Performed by: FAMILY MEDICINE

## 2020-10-06 PROCEDURE — 86376 MICROSOMAL ANTIBODY EACH: CPT | Performed by: FAMILY MEDICINE

## 2020-10-06 PROCEDURE — 36415 COLL VENOUS BLD VENIPUNCTURE: CPT | Performed by: FAMILY MEDICINE

## 2020-10-06 ASSESSMENT — MIFFLIN-ST. JEOR: SCORE: 1200.77

## 2020-10-06 NOTE — PROGRESS NOTES
"Subjective     Kelley Serra is a 30 year old female who presents to clinic today for the following health issues:    HPI       Patient has concerns of her lower eyelashes falling out and also some throat concerns. She is wondering if this may be caused due to her thyroid- she has been taking some OTC thyroid supplements which do seem to help somewhat.    1)Eyelashes on the botton on the right eye would fall off and then regrows , for about five months , thought would get better , off and on not all the time   2) tightness in the throat for about six months , she started taking OTC herbal supplements with ashwagandha and other herbs ,  that would get her throat feel better now.  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      Objective    /74   Pulse 105   Temp 98.5  F (36.9  C) (Tympanic)   Resp 16   Ht 1.6 m (5' 3\")   Wt 52.2 kg (115 lb)   SpO2 100%   BMI 20.37 kg/m    Body mass index is 20.37 kg/m .  Physical Exam   GENERAL: healthy, alert and no distress  EYES: Eyes grossly normal to inspection, PERRL and conjunctivae and sclerae normal  NECK: no adenopathy, no asymmetry, masses, or scars and thyroid normal to palpation  RESP: lungs clear to auscultation - no rales, rhonchi or wheezes  CV: regular rate and rhythm, normal S1 S2, no S3 or S4, no murmur, click or rub, no peripheral edema and peripheral pulses strong  ABDOMEN: soft, nontender, no hepatosplenomegaly, no masses and bowel sounds normal  MS: no gross musculoskeletal defects noted, no edema  NEURO: Normal strength and tone, mentation intact and speech " normal  LYMPH: no cervical, supraclavicular, axillary, or inguinal adenopathy    Results for orders placed or performed in visit on 10/06/20 (from the past 24 hour(s))   CBC with platelets   Result Value Ref Range    WBC 7.1 4.0 - 11.0 10e9/L    RBC Count 4.80 3.8 - 5.2 10e12/L    Hemoglobin 13.7 11.7 - 15.7 g/dL    Hematocrit 41.9 35.0 - 47.0 %    MCV 87 78 - 100 fl    MCH 28.5 26.5 - 33.0 pg    MCHC 32.7 31.5 - 36.5 g/dL    RDW 13.6 10.0 - 15.0 %    Platelet Count 230 150 - 450 10e9/L           Assessment & Plan     Fatigue, unspecified type  We discussed doing some blood work now   - TSH with free T4 reflex  - CBC with platelets  - ANTI THYROGLOBULIN ANTIBODY  - THYROID PEROXIDASE ANTIBODY  - T4, free  - T3, Free    Brain fog  Since there is a family history of thyroid issues will check her TSH, free T4 and free T3 as well as antithyroglobulin antibodies   - TSH with free T4 reflex  - CBC with platelets  - ANTI THYROGLOBULIN ANTIBODY  - THYROID PEROXIDASE ANTIBODY  - T4, free  - T3, Free    Disorder of eyelashes  Discussed if all there labs are normal and she is still experiencing the eyelashes falling will see dermatology and I have given her the referral   - TSH with free T4 reflex  - CBC with platelets  - ANTI THYROGLOBULIN ANTIBODY  - THYROID PEROXIDASE ANTIBODY  - T4, free  - T3, Free  - DERMATOLOGY ADULT REFERRAL; Future        RTC if no improving or worsening.  Pt is aware  and comfortable with the current plan.      Ariadne Jimenez MD  [facility]

## 2020-10-07 LAB
T4 FREE SERPL-MCNC: 0.93 NG/DL (ref 0.76–1.46)
THYROGLOB AB SERPL IA-ACNC: <20 IU/ML (ref 0–40)
THYROPEROXIDASE AB SERPL-ACNC: 259 IU/ML
TSH SERPL DL<=0.005 MIU/L-ACNC: 3.96 MU/L (ref 0.4–4)

## 2020-10-09 ENCOUNTER — MYC MEDICAL ADVICE (OUTPATIENT)
Dept: FAMILY MEDICINE | Facility: CLINIC | Age: 30
End: 2020-10-09

## 2020-10-09 DIAGNOSIS — E06.3 HASHIMOTO'S THYROIDITIS: Primary | ICD-10-CM

## 2020-10-20 ENCOUNTER — VIRTUAL VISIT (OUTPATIENT)
Dept: ENDOCRINOLOGY | Facility: CLINIC | Age: 30
End: 2020-10-20
Payer: COMMERCIAL

## 2020-10-20 DIAGNOSIS — E06.3 HASHIMOTO'S THYROIDITIS: Primary | ICD-10-CM

## 2020-10-20 DIAGNOSIS — E01.0 THYROMEGALY: ICD-10-CM

## 2020-10-20 PROCEDURE — 99203 OFFICE O/P NEW LOW 30 MIN: CPT | Mod: 95 | Performed by: INTERNAL MEDICINE

## 2020-10-20 RX ORDER — LEVOTHYROXINE SODIUM 25 UG/1
25 TABLET ORAL DAILY
Qty: 30 TABLET | Refills: 11 | Status: SHIPPED | OUTPATIENT
Start: 2020-10-20 | End: 2020-12-10

## 2020-10-20 NOTE — PROGRESS NOTES
"Kelley Serra is a 30 year old female who is being evaluated via a billable video visit.      The patient has been notified of following:     \"This video visit will be conducted via a call between you and your physician/provider. We have found that certain health care needs can be provided without the need for an in-person physical exam.  This service lets us provide the care you need with a video conversation.  If a prescription is necessary we can send it directly to your pharmacy.  If lab work is needed we can place an order for that and you can then stop by our lab to have the test done at a later time.    Video visits are billed at different rates depending on your insurance coverage.  Please reach out to your insurance provider with any questions.    If during the course of the call the physician/provider feels a video visit is not appropriate, you will not be charged for this service.\"    Patient has given verbal consent for Video visit? Yes  How would you like to obtain your AVS? MyChart  If you are dropped from the video visit, the video invite should be resent to:   Will anyone else be joining your video visit? No        Video-Visit Details    Type of service:  Video Visit    Video Start Time: 2:28 PM  Video End Time: 2:54 PM    Originating Location (pt. Location): Home    Distant Location (provider location):  [facility]     Platform used for Video Visit: Miller    CC: Abnormal thyroid function tests.     HPI:   Patient presents for evaluation of abnormal thyroid function tests.   She asked to have labs drawn due to concerns about \"brain fog\", dry skin, and fatigue over the last five years.   Over the last 15 months, she has noticed a \"tightness\" in her throat.     No recent changes in weight.   Sleeps 8 hours a night. She does not toss or turn.     Occasional palpitations when she is lying down.   No SOB.     Menses are regular. Notes they seeming to last longer recently.   No " children. No immediate plans for children.   No dysphagia or changes in speech.     She has a history of stage 1 ovarian cancer.   She has a right oophorectomy.     ROS: 10 point ROS neg other than the symptoms noted above in the HPI.    PMH:   Patient Active Problem List   Diagnosis     Esophageal reflux     Benign neoplasm of skin     CARDIOVASCULAR SCREENING; LDL GOAL LESS THAN 160     Lactose intolerance     Allergy to mold spores     House dust mite allergy     Allergic rhinitis due to animal dander     Diagnostic skin and sensitization tests     Family history of colon cancer     Vitamin D deficiency     JOHN PAUL (generalized anxiety disorder)     Family history of thyroid disease in mother     Meds:  Current Outpatient Medications   Medication     Cholecalciferol (VITAMIN D-3 PO)     fluocinonide (LIDEX) 0.05 % external cream     loratadine (CLARITIN) 10 MG tablet     MULTIPLE VITAMINS PO     No current facility-administered medications for this visit.       FHX:   Mother has hypothyroidism.     SHX:  Non-smoker.   Works as free ethel writer.     Exam:   GENERAL: Healthy, alert and no distress  EYES: Eyes grossly normal to inspection.  No discharge or erythema, or obvious scleral/conjunctival abnormalities.  NECK: No asymmetry, visible masses or scars  RESP: No audible wheeze, cough, or visible cyanosis.  No visible retractions or increased work of breathing.    MS: No gross musculoskeletal defects noted.  Normal range of motion.  No visible edema.  SKIN: Visible skin clear. No significant rash, abnormal pigmentation or lesions.  NEURO: Cranial nerves grossly intact.  Mentation and speech appropriate for age.  PSYCH: Mentation appears normal, affect normal/bright, judgement and insight intact, normal speech and appearance well-groomed.      A/P:   Abnormal thyroid function tests - Positive TPO antibody. Explained this means she will develop hypothyroidism in the next 10 years. Extensive discussion of thyroid  hormone and normal physiology. Included was discussion of thyroid in relation to weight and energy. She is not actively pursuing pregnancy. As per her neck discomfort, this could be due to thyroid enlargement from Hashimoto's thyroiditis or a nodule.   -Schedule a thyroid ultrasound.   -Start levothyroxine 25 mcg daily.   -Labs in 4 weeks.     Devan Mcpherson M.D

## 2020-10-26 ENCOUNTER — ANCILLARY PROCEDURE (OUTPATIENT)
Dept: ULTRASOUND IMAGING | Facility: CLINIC | Age: 30
End: 2020-10-26
Attending: INTERNAL MEDICINE
Payer: COMMERCIAL

## 2020-10-26 ENCOUNTER — ANCILLARY PROCEDURE (OUTPATIENT)
Dept: ULTRASOUND IMAGING | Facility: CLINIC | Age: 30
End: 2020-10-26
Attending: OBSTETRICS & GYNECOLOGY
Payer: COMMERCIAL

## 2020-10-26 DIAGNOSIS — D39.11 OVARIAN TUMOR OF BORDERLINE MALIGNANCY, RIGHT: ICD-10-CM

## 2020-10-26 DIAGNOSIS — E01.0 THYROMEGALY: ICD-10-CM

## 2020-10-26 NOTE — PROGRESS NOTES
"            Follow Up Notes on Referred Patient    Date: 10/29/2020       Dr. Joe Stratton, MD  [address] [place] [address]       RE: Kelley Serra  : 1990  JAYASHREE: 10/29/2020    Dear Dr. 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 She completed treatment on 10/2/15 with an exploratory laparotomy, RSO, and fertility sparing staging. She presents today for an annual surveillance visit.     HPI:   Oscar comes to the clinic overall feeling well. She reports that she has recently been diagnosed with Hashimoto's thyroiditis. She is on Synthroid and being followed by Endocrine. She reports that the first day of the last period was . Before the ultrasound on 10/27, she reports that she had some vaginal mucous that was mildly blood tinged. She is not on OCPs or any other birth control. She reports that her  had a vasectomy a few years ago. She is sexually active without dysparunia. She reports that she has had intermittent vaginal spotting in between cycles that was blood-tinged mucous that happened once this month. Before menses about 1-2 days prior, she will have \"stabbing\" pain over the right pelvis that has been consistent since surgery in . She denies pain on the left side of pelvis. Ibuprofen controls this pain. This goes away after day two of menses. Over the past few months, this has occurred once per month. Her periods are usually about seven days and are light with a couple heavy days in the beginning. She will have the mucous blood tinged discharge about one week after her period for one day. She denies pelvic pain or bloating. She denies early satiety. She denies any abnormal vaginal bleeding, no changes in her bowel or bladder habits, no nausea/emesis, no lower extremity edema, and no difficulties eating or sleeping. She denies any abdominal " discomfort/bloating, no fevers or chills, and no chest pain or shortness of breath. Up to date on visits with her PCP. She would like to receive her flu shot today in clinic.       Brief Oncology History:  9/29/15: Abdominal pain, 30cm pelvic mass.  73.  10/2/15: Sent to OR for ex lap, RSO, and fertility sparing staging. 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 Elected observation at this time.  1/25/16:  13  8/1/16:  9, Pap NIL  10/31/16:  10  1/30/17:  13  4/24/17:  10  8/1/17:  10  10/27/17:  10  10/28/17: Pelvic ultrasound:  IMPRESSION:   1. Normal sonographic evaluation of the uterus and left ovary.  2. Trace free fluid in the pelvis which may be physiologic.   4/27/18:  9  10/20/18: Pelvic US impression:  Right ovary is surgically absent. Uterus, endometrial  stripe and left ovary are unremarkable. Small amount of free pelvic  fluid is likely physiologic.     10/21/19: Pelvic US:  IMPRESSION:  1. 1.6 cm complex left ovarian cyst.  2. Otherwise unremarkable pelvis post right oophorectomy.     10/30/19:  10, Pap/HPV NILM HPV negative    12/02/19: Pelvic US:   IMPRESSION:  1. 1.7 cm simple cyst in the left ovary, likely a dominant follicle.  The complex cyst that was present in the left ovary on the comparison  study dated 10/21/2019 is no longer visualized and was likely a functional hemorrhagic cyst.  2. Unremarkable appearance of the uterus.  3. Prior right oophorectomy.    10/26/2020: Pelvic US:   FINDINGS:  UTERUS: 7.3 x 3.4 x 4.7 cm. Several small nabothian cysts are noted  within the cervix. Otherwise unremarkable in size and position with no  masses.     ENDOMETRIUM: 11 mm. Normal smooth endometrium. There is a small amount  of nonspecific fluid within the endocervical canal.     RIGHT OVARY: The right ovary is not visualized, and is surgically  absent by history.      LEFT OVARY: 5 x 1.9 x 2.9 cm. Normal with flow  demonstrated.     Small amount of nonspecific free fluid in the pelvis.                                                                      IMPRESSION:  1.  Postoperative changes of prior right oophorectomy.  2.  Small amount of free fluid in the pelvis is nonspecific, and may  be physiologic.  3.  Small amount of fluid within the endocervical canal is also  nonsignificant, and could be related to the presence of blood  products. Please clinically correlate.     TEE KRAMER MD    10/29/2020:  pending      Review of Systems     Constitutional:  Negative for fever, chills, weight loss, weight gain, fatigue, decreased appetite, night sweats, recent stressors, height gain, height loss, post-operative complications, incisional pain, hallucinations, increased energy, hyperactivity and confused.   HENT:  Negative for ear pain, hearing loss, tinnitus, nosebleeds, trouble swallowing, hoarse voice, mouth sores, sore throat, ear discharge, tooth pain, gum tenderness, taste disturbance, smell disturbance, hearing aid, bleeding gums, dry mouth, sinus pain, sinus congestion and neck mass.    Eyes:  Negative for double vision, pain, redness, eye pain, decreased vision, eye watering, eye bulging, eye dryness, flashing lights, spots, floaters, strabismus, tunnel vision, jaundice and eye irritation.   Respiratory:   Negative for cough, hemoptysis, sputum production, shortness of breath, wheezing, sleep disturbances due to breathing, snores loudly, respiratory pain, dyspnea on exertion, cough disturbing sleep and postural dyspnea.    Cardiovascular:  Negative for chest pain, dyspnea on exertion, palpitations, orthopnea, claudication, leg swelling, fingers/toes turn blue, hypertension, hypotension, syncope, history of heart murmur, chest pain on exertion, chest pain at rest, pacemaker, few scattered varicosities, leg pain, sleep disturbances due to breathing, tachycardia, light-headedness, exercise intolerance and edema.    Gastrointestinal:  Negative for heartburn, nausea, vomiting, abdominal pain, diarrhea, constipation, blood in stool, melena, rectal pain, bloating, hemorrhoids, bowel incontinence, jaundice, rectal bleeding, coffee ground emesis and change in stool.   Genitourinary:  Positive for abnormal vaginal bleeding. Negative for bladder incontinence, dysuria, urgency, hematuria, flank pain, vaginal discharge, difficulty urinating, genital sores, dyspareunia, decreased libido, nocturia, voiding less frequently, arousal difficulty, excessive menstruation, menstrual changes, hot flashes, vaginal dryness and postmenopausal bleeding.   Musculoskeletal:  Negative for myalgias, back pain, joint swelling, arthralgias, stiffness, muscle cramps, neck pain, bone pain, muscle weakness and fracture.   Skin:  Negative for nail changes, itching, poor wound healing, rash, hair changes, skin changes, acne, warts, poor wound healing, scarring, flaky skin, Raynaud's phenomenon, sensitivity to sunlight and skin thickening.   Neurological:  Negative for dizziness, tingling, tremors, speech change, seizures, loss of consciousness, weakness, light-headedness, numbness, headaches, disturbances in coordination, extremity numbness, memory loss, difficulty walking and paralysis.   Endo/Heme:  Negative for anemia, swollen glands and bruises/bleeds easily.   Psychiatric/Behavioral:  Negative for depression, hallucinations, memory loss, decreased concentration, mood swings and panic attacks.    Breast:  Negative for breast discharge, breast mass, breast pain and nipple retraction.   Endocrine:  Negative for altered temperature regulation, polyphagia, polydipsia, unwanted hair growth and change in facial hair.        Past Medical History:    Past Medical History:   Diagnosis Date     Allergic rhinitis due to animal dander      Allergy to mold spores     2/26/14 skin tests pos. for:  dog(+)/DM/M only and only on intradermals--ie, minimally allergic      Asthma, mild intermittent     last inhaler usage >6 years ago (4/11)     Cancer (H) 9/2015    Stage 1A ovarian cancer     Diagnostic skin and sensitization tests 2/26/14 skin tests pos. 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    treated with RSO         Past Surgical History:    Past Surgical History:   Procedure Laterality Date     APPENDECTOMY  10/2/2015     BIOPSY  10/2/2015     COLONOSCOPY N/A 12/2/2015    Procedure: COLONOSCOPY;  Surgeon: Aaron Garrison MD;  Location:  GI     ESOPHAGOSCOPY, GASTROSCOPY, DUODENOSCOPY (EGD), COMBINED N/A 11/23/2015    Procedure: COMBINED ESOPHAGOSCOPY, GASTROSCOPY, DUODENOSCOPY (EGD), BIOPSY SINGLE OR MULTIPLE;  Surgeon: Babs Cassidy MD;  Location:  GI     GYN SURGERY  10/2/2015     LAPAROTOMY EXPLORATORY  10/2/15    Right salpingo-oophorectomy, omentectomy, appendectomy, lymph node biopsies, cancer staging      LAPAROTOMY, STAGING, COMBINED N/A 10/2/2015    Procedure: COMBINED LAPAROTOMY, STAGING;  Surgeon: Duyen Thayer MD;  Location:  OR         Health Maintenance Due   Topic Date Due     HIV SCREENING  07/07/2003     HEPATITIS B IMMUNIZATION (2 of 3 - 3-dose primary series) 01/26/2004     HEPATITIS C SCREENING  07/07/2006     PHQ-9  10/25/2019     PREVENTIVE CARE VISIT  04/25/2020       Current Medications:     Current Outpatient Medications   Medication Sig Dispense Refill     Cholecalciferol (VITAMIN D-3 PO)        levothyroxine (SYNTHROID/LEVOTHROID) 25 MCG tablet Take 1 tablet (25 mcg) by mouth daily 30 tablet 11     loratadine (CLARITIN) 10 MG tablet Take 1 tablet (10 mg) by mouth daily 30 tablet 1     MULTIPLE VITAMINS PO take  by mouth.       fluocinonide (LIDEX) 0.05 % external cream Apply a thin layer to affected area BID x 2 weeks, tapering with improvement. Do not apply to face or  body folds. (Patient not taking: Reported on 10/29/2020) 30 g 0         Allergies:        Allergies   Allergen Reactions     Cleocin Rash     Keflex [Cephalexin Monohydrate] Rash     Zoloft         Social History:     Social History     Tobacco Use     Smoking status: Never Smoker     Smokeless tobacco: Never Used   Substance Use Topics     Alcohol use: Yes     Comment: Socially       History   Drug Use No         Family History:     The patient's family history is notable for     Family History   Problem Relation Age of Onset     Diabetes Mother         gestational     Thyroid Disease Mother      Blood Disease Mother         anemia     Cancer - colorectal Mother 54     Colon Cancer Mother         Doing well     Cancer Maternal Grandmother      Blood Disease Maternal Grandmother         cervical     Respiratory Maternal Grandmother         emphysema     Diabetes Maternal Grandfather              Cerebrovascular Disease Maternal Grandfather      Cancer Maternal Grandfather         kidney     Breast Cancer Paternal Grandmother              Diabetes Paternal Grandfather              Cerebrovascular Disease Paternal Grandfather      Skin Cancer Paternal Grandfather      Heart Disease Father 56        had angioplasty     Coronary Artery Disease Father         Had angioplasty in 2014         Physical Exam:     /69   Pulse 104   Temp 98.4  F (36.9  C) (Oral)   Resp 18   Wt 55.2 kg (121 lb 9.6 oz)   LMP 10/09/2020   SpO2 97%   BMI 21.54 kg/m    Body mass index is 21.54 kg/m .    General Appearance: healthy and alert, no distress     HEENT: no thyromegaly, no palpable nodules or masses        Cardiovascular: regular rate and rhythm, no gallops, rubs or murmurs     Respiratory: lungs clear, no rales, rhonchi or wheezes    Musculoskeletal: extremities non tender and without edema    Skin: no lesions or rashes     Neurological: normal gait, no gross defects     Psychiatric: appropriate  mood and affect                               Hematological: normal cervical, supraclavicular and inguinal lymph nodes     Gastrointestinal:       abdomen soft, non-tender, non-distended, no organomegaly or masses    Genitourinary: External genitalia and urethral meatus appears normal.  Vagina is smooth without nodularity or masses.  Cervix appears normal and without lesions. Bimanual exam reveal no masses, nodularity or fullness.  Recto-vaginal exam confirms these findings.      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 She completed treatment on 10/2/15 with an exploratory laparotomy, RSO, and fertility sparing staging. She presents today for an annual surveillance visit.    A total of 20 minutes was spent with the patient, over 50% minutes of which were spent in counseling the patient and/or treatment planning.      Plan:     1) History of borderline mucinous tumor of the right ovary: H&P without abnormal findings,  pending. Pelvic US reviewed with patient and Dr. Stratton. No concern in regards to left ovary size per Dr. Stratton. Patient would like to have a repeat TVUS in three months and in agreement for surveillance. Will order for three months and RTC with TVUS and  in one year. Encouraged patient to monitor her pelvic pain and if it occurs outside of ovulation and menses and is sustained to let our office know. Her blood tinged mucous 1-2 weeks after menses is normal around the time of ovulation. Reviewed signs and symptoms for when she should contact the clinic or seek additional care. Patient to contact the clinic with any questions or concerns in the interim. Pap with HPV due 10/2024.     2) Genetic testing: Risk factors have been assessed and the patient does not meet qualifications for genetic counseling based on NCCN guidelines.     3) Labs:  and pelvic ultrasound; Influenza vaccine today in  clinic.    4) Health maintenance issues discussed today include to follow up with PCP for co-morbid conditions and non-gynecologic issues.     5) Thyroid: newly diagnosed Hashimoto's thyroiditis. Oscar has started Synthroid for this. She will continue to follow up with endocrinology.       Alma Edwards, MSN, [facility]-BC  Women's Health Nurse Practitioner  Department of Ob/Gyn and Women's Health  Division of Gynecologic Oncology  [facility]  Pager: [phone]      CC  Patient Care Team:  Tamica Durán APRN CNP as PCP - General (Family Practice)  Myra Gifford APRN CNP as Nurse Practitioner (Gynecologic Oncology)  Miguel A Stratton MD as MD (Oncology)  Devan Mcpherson MD as Assigned PCP  MIGUEL A STRATTON

## 2020-10-28 ASSESSMENT — ENCOUNTER SYMPTOMS
BRUISES/BLEEDS EASILY: 0
EYE PAIN: 0
SPEECH CHANGE: 0
SPUTUM PRODUCTION: 0
NERVOUS/ANXIOUS: 0
MEMORY LOSS: 0
NAIL CHANGES: 0
BLOATING: 0
DIFFICULTY URINATING: 0
EXTREMITY NUMBNESS: 0
MYALGIAS: 0
SYNCOPE: 0
TREMORS: 0
NIGHT SWEATS: 0
ABDOMINAL PAIN: 0
DISTURBANCES IN COORDINATION: 0
HOT FLASHES: 0
POOR WOUND HEALING: 0
TINGLING: 0
VOMITING: 0
SMELL DISTURBANCE: 0
CHILLS: 0
EYE IRRITATION: 0
DECREASED LIBIDO: 0
EXERCISE INTOLERANCE: 0
DIARRHEA: 0
SLEEP DISTURBANCES DUE TO BREATHING: 0
COUGH DISTURBING SLEEP: 0
SINUS PAIN: 0
RECTAL PAIN: 0
EYE REDNESS: 0
NAUSEA: 0
RESPIRATORY PAIN: 0
INSOMNIA: 0
SORE THROAT: 0
FEVER: 0
SEIZURES: 0
COUGH: 0
TACHYCARDIA: 0
INCREASED ENERGY: 0
EYE WATERING: 0
TASTE DISTURBANCE: 0
HEADACHES: 0
WHEEZING: 0
SNORES LOUDLY: 0
MUSCLE WEAKNESS: 0
CONSTIPATION: 0
BREAST MASS: 0
MUSCLE CRAMPS: 0
POLYPHAGIA: 0
LEG PAIN: 0
DECREASED CONCENTRATION: 0
NUMBNESS: 0
POLYDIPSIA: 0
PALPITATIONS: 0
WEIGHT GAIN: 0
FATIGUE: 0
HYPERTENSION: 0
SKIN CHANGES: 0
SHORTNESS OF BREATH: 0
NECK PAIN: 0
FLANK PAIN: 0
HEARTBURN: 0
LOSS OF CONSCIOUSNESS: 0
PARALYSIS: 0
HYPOTENSION: 0
HALLUCINATIONS: 0
WEAKNESS: 0
DECREASED APPETITE: 0
POSTURAL DYSPNEA: 0
PANIC: 0
HOARSE VOICE: 0
BOWEL INCONTINENCE: 0
WEIGHT LOSS: 0
DEPRESSION: 0
ARTHRALGIAS: 0
SINUS CONGESTION: 0
ALTERED TEMPERATURE REGULATION: 0
BREAST PAIN: 0
DOUBLE VISION: 0
TROUBLE SWALLOWING: 0
SWOLLEN GLANDS: 0
BACK PAIN: 0
CLAUDICATION: 0
HEMOPTYSIS: 0
ORTHOPNEA: 0
DIZZINESS: 0
HEMATURIA: 0
DYSPNEA ON EXERTION: 0
BLOOD IN STOOL: 0
JOINT SWELLING: 0
DYSURIA: 0
JAUNDICE: 0
RECTAL BLEEDING: 0
STIFFNESS: 0
LIGHT-HEADEDNESS: 0
LEG SWELLING: 0
NECK MASS: 0

## 2020-10-29 ENCOUNTER — APPOINTMENT (OUTPATIENT)
Dept: LAB | Facility: CLINIC | Age: 30
End: 2020-10-29
Attending: NURSE PRACTITIONER
Payer: COMMERCIAL

## 2020-10-29 ENCOUNTER — ONCOLOGY VISIT (OUTPATIENT)
Dept: ONCOLOGY | Facility: CLINIC | Age: 30
End: 2020-10-29
Attending: OBSTETRICS & GYNECOLOGY
Payer: COMMERCIAL

## 2020-10-29 VITALS
BODY MASS INDEX: 21.54 KG/M2 | RESPIRATION RATE: 18 BRPM | DIASTOLIC BLOOD PRESSURE: 69 MMHG | OXYGEN SATURATION: 97 % | WEIGHT: 121.6 LBS | TEMPERATURE: 98.4 F | HEART RATE: 104 BPM | SYSTOLIC BLOOD PRESSURE: 101 MMHG

## 2020-10-29 DIAGNOSIS — Z23 NEED FOR PROPHYLACTIC VACCINATION AND INOCULATION AGAINST INFLUENZA: Primary | ICD-10-CM

## 2020-10-29 DIAGNOSIS — D39.11 OVARIAN TUMOR OF BORDERLINE MALIGNANCY, RIGHT: ICD-10-CM

## 2020-10-29 DIAGNOSIS — E06.3 HASHIMOTO'S THYROIDITIS: ICD-10-CM

## 2020-10-29 DIAGNOSIS — E01.0 THYROMEGALY: ICD-10-CM

## 2020-10-29 LAB
CALCIUM SERPL-MCNC: 9 MG/DL (ref 8.5–10.1)
CANCER AG125 SERPL-ACNC: 14 U/ML (ref 0–30)
PTH-INTACT SERPL-MCNC: 46 PG/ML (ref 18–80)

## 2020-10-29 PROCEDURE — 36415 COLL VENOUS BLD VENIPUNCTURE: CPT

## 2020-10-29 PROCEDURE — G0008 ADMIN INFLUENZA VIRUS VAC: HCPCS | Performed by: OBSTETRICS & GYNECOLOGY

## 2020-10-29 PROCEDURE — G0463 HOSPITAL OUTPT CLINIC VISIT: HCPCS | Mod: 25

## 2020-10-29 PROCEDURE — 86304 IMMUNOASSAY TUMOR CA 125: CPT | Performed by: OBSTETRICS & GYNECOLOGY

## 2020-10-29 PROCEDURE — 99213 OFFICE O/P EST LOW 20 MIN: CPT | Performed by: OBSTETRICS & GYNECOLOGY

## 2020-10-29 PROCEDURE — 83970 ASSAY OF PARATHORMONE: CPT | Performed by: OBSTETRICS & GYNECOLOGY

## 2020-10-29 PROCEDURE — G0463 HOSPITAL OUTPT CLINIC VISIT: HCPCS

## 2020-10-29 PROCEDURE — 250N000011 HC RX IP 250 OP 636: Performed by: OBSTETRICS & GYNECOLOGY

## 2020-10-29 PROCEDURE — 90686 IIV4 VACC NO PRSV 0.5 ML IM: CPT | Performed by: OBSTETRICS & GYNECOLOGY

## 2020-10-29 PROCEDURE — 82310 ASSAY OF CALCIUM: CPT | Performed by: OBSTETRICS & GYNECOLOGY

## 2020-10-29 RX ADMIN — INFLUENZA A VIRUS A/GUANGDONG-MAONAN/SWL1536/2019 CNIC-1909 (H1N1) ANTIGEN (FORMALDEHYDE INACTIVATED), INFLUENZA A VIRUS A/HONG KONG/2671/2019 (H3N2) ANTIGEN (FORMALDEHYDE INACTIVATED), INFLUENZA B VIRUS B/PHUKET/3073/2013 ANTIGEN (FORMALDEHYDE INACTIVATED), AND INFLUENZA B VIRUS B/WASHINGTON/02/2019 ANTIGEN (FORMALDEHYDE INACTIVATED) 0.5 ML: 15; 15; 15; 15 INJECTION, SUSPENSION INTRAMUSCULAR at 12:42

## 2020-10-29 ASSESSMENT — PAIN SCALES - GENERAL: PAINLEVEL: NO PAIN (0)

## 2020-10-29 NOTE — PROGRESS NOTES
Chief Complaint   Patient presents with     Blood Draw     VPT blood draw and vitals      Venipuncture labs drawn from right arm

## 2020-10-29 NOTE — RESULT ENCOUNTER NOTE
Reviewed with Dr. Stratton. No concern in regards to left ovary size. Pt reports that she had mucous blood tinged discharge prior to TVUS. Will repeat TVUS in three months and in one year.

## 2020-10-29 NOTE — LETTER
"    10/29/2020         RE: Kelley Serra   [facility] [address]        Dear Colleague,    Thank you for referring your patient, Kelley Serra, to the [facility]. Please see a copy of my visit note below.                Follow Up Notes on Referred Patient    Date: 10/29/2020       Dr. Joe Stratton MD  [address] [place] [address]       RE: Kelley Serra  : 1990  JAYASHREE: 10/29/2020    Dear Dr. 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 She completed treatment on 10/2/15 with an exploratory laparotomy, RSO, and fertility sparing staging. She presents today for an annual surveillance visit.     HPI:   Oscar comes to the clinic overall feeling well. She reports that she has recently been diagnosed with Hashimoto's thyroiditis. She is on Synthroid and being followed by Endocrine. She reports that the first day of the last period was . Before the ultrasound on 10/27, she reports that she had some vaginal mucous that was mildly blood tinged. She is not on OCPs or any other birth control. She reports that her  had a vasectomy a few years ago. She is sexually active without dysparunia. She reports that she has had intermittent vaginal spotting in between cycles that was blood-tinged mucous that happened once this month. Before menses about 1-2 days prior, she will have \"stabbing\" pain over the right pelvis that has been consistent since surgery in . She denies pain on the left side of pelvis. Ibuprofen controls this pain. This goes away after day two of menses. Over the past few months, this has occurred once per month. Her periods are usually about seven days and are light with a couple heavy days in the beginning. She will have the mucous blood tinged discharge about one week after her period for one day. She " denies pelvic pain or bloating. She denies early satiety. She denies any abnormal vaginal bleeding, no changes in her bowel or bladder habits, no nausea/emesis, no lower extremity edema, and no difficulties eating or sleeping. She denies any abdominal discomfort/bloating, no fevers or chills, and no chest pain or shortness of breath. Up to date on visits with her PCP. She would like to receive her flu shot today in clinic.       Brief Oncology History:  9/29/15: Abdominal pain, 30cm pelvic mass.  73.  10/2/15: Sent to OR for ex lap, RSO, and fertility sparing staging. 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 Elected observation at this time.  1/25/16:  13  8/1/16:  9, Pap NIL  10/31/16:  10  1/30/17:  13  4/24/17:  10  8/1/17:  10  10/27/17:  10  10/28/17: Pelvic ultrasound:  IMPRESSION:   1. Normal sonographic evaluation of the uterus and left ovary.  2. Trace free fluid in the pelvis which may be physiologic.   4/27/18:  9  10/20/18: Pelvic US impression:  Right ovary is surgically absent. Uterus, endometrial  stripe and left ovary are unremarkable. Small amount of free pelvic  fluid is likely physiologic.     10/21/19: Pelvic US:  IMPRESSION:  1. 1.6 cm complex left ovarian cyst.  2. Otherwise unremarkable pelvis post right oophorectomy.     10/30/19:  10, Pap/HPV NILM HPV negative    12/02/19: Pelvic US:   IMPRESSION:  1. 1.7 cm simple cyst in the left ovary, likely a dominant follicle.  The complex cyst that was present in the left ovary on the comparison  study dated 10/21/2019 is no longer visualized and was likely a functional hemorrhagic cyst.  2. Unremarkable appearance of the uterus.  3. Prior right oophorectomy.    10/26/2020: Pelvic US:   FINDINGS:  UTERUS: 7.3 x 3.4 x 4.7 cm. Several small nabothian cysts are noted  within the cervix. Otherwise unremarkable in size and position with no  masses.     ENDOMETRIUM: 11  mm. Normal smooth endometrium. There is a small amount  of nonspecific fluid within the endocervical canal.     RIGHT OVARY: The right ovary is not visualized, and is surgically  absent by history.      LEFT OVARY: 5 x 1.9 x 2.9 cm. Normal with flow demonstrated.     Small amount of nonspecific free fluid in the pelvis.                                                                      IMPRESSION:  1.  Postoperative changes of prior right oophorectomy.  2.  Small amount of free fluid in the pelvis is nonspecific, and may  be physiologic.  3.  Small amount of fluid within the endocervical canal is also  nonsignificant, and could be related to the presence of blood  products. Please clinically correlate.     TEE KRAMER MD    10/29/2020:  pending      Review of Systems     Constitutional:  Negative for fever, chills, weight loss, weight gain, fatigue, decreased appetite, night sweats, recent stressors, height gain, height loss, post-operative complications, incisional pain, hallucinations, increased energy, hyperactivity and confused.   HENT:  Negative for ear pain, hearing loss, tinnitus, nosebleeds, trouble swallowing, hoarse voice, mouth sores, sore throat, ear discharge, tooth pain, gum tenderness, taste disturbance, smell disturbance, hearing aid, bleeding gums, dry mouth, sinus pain, sinus congestion and neck mass.    Eyes:  Negative for double vision, pain, redness, eye pain, decreased vision, eye watering, eye bulging, eye dryness, flashing lights, spots, floaters, strabismus, tunnel vision, jaundice and eye irritation.   Respiratory:   Negative for cough, hemoptysis, sputum production, shortness of breath, wheezing, sleep disturbances due to breathing, snores loudly, respiratory pain, dyspnea on exertion, cough disturbing sleep and postural dyspnea.    Cardiovascular:  Negative for chest pain, dyspnea on exertion, palpitations, orthopnea, claudication, leg swelling, fingers/toes turn blue,  hypertension, hypotension, syncope, history of heart murmur, chest pain on exertion, chest pain at rest, pacemaker, few scattered varicosities, leg pain, sleep disturbances due to breathing, tachycardia, light-headedness, exercise intolerance and edema.   Gastrointestinal:  Negative for heartburn, nausea, vomiting, abdominal pain, diarrhea, constipation, blood in stool, melena, rectal pain, bloating, hemorrhoids, bowel incontinence, jaundice, rectal bleeding, coffee ground emesis and change in stool.   Genitourinary:  Positive for abnormal vaginal bleeding. Negative for bladder incontinence, dysuria, urgency, hematuria, flank pain, vaginal discharge, difficulty urinating, genital sores, dyspareunia, decreased libido, nocturia, voiding less frequently, arousal difficulty, excessive menstruation, menstrual changes, hot flashes, vaginal dryness and postmenopausal bleeding.   Musculoskeletal:  Negative for myalgias, back pain, joint swelling, arthralgias, stiffness, muscle cramps, neck pain, bone pain, muscle weakness and fracture.   Skin:  Negative for nail changes, itching, poor wound healing, rash, hair changes, skin changes, acne, warts, poor wound healing, scarring, flaky skin, Raynaud's phenomenon, sensitivity to sunlight and skin thickening.   Neurological:  Negative for dizziness, tingling, tremors, speech change, seizures, loss of consciousness, weakness, light-headedness, numbness, headaches, disturbances in coordination, extremity numbness, memory loss, difficulty walking and paralysis.   Endo/Heme:  Negative for anemia, swollen glands and bruises/bleeds easily.   Psychiatric/Behavioral:  Negative for depression, hallucinations, memory loss, decreased concentration, mood swings and panic attacks.    Breast:  Negative for breast discharge, breast mass, breast pain and nipple retraction.   Endocrine:  Negative for altered temperature regulation, polyphagia, polydipsia, unwanted hair growth and change in facial  hair.        Past Medical History:    Past Medical History:   Diagnosis Date     Allergic rhinitis due to animal dander      Allergy to mold spores     2/26/14 skin tests pos. for:  dog(+)/DM/M only and only on intradermals--ie, minimally allergic     Asthma, mild intermittent     last inhaler usage >6 years ago (4/11)     Cancer (H) 9/2015    Stage 1A ovarian cancer     Diagnostic skin and sensitization tests 2/26/14 skin tests pos. 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    treated with RSO         Past Surgical History:    Past Surgical History:   Procedure Laterality Date     APPENDECTOMY  10/2/2015     BIOPSY  10/2/2015     COLONOSCOPY N/A 12/2/2015    Procedure: COLONOSCOPY;  Surgeon: Araon Garrison MD;  Location:  GI     ESOPHAGOSCOPY, GASTROSCOPY, DUODENOSCOPY (EGD), COMBINED N/A 11/23/2015    Procedure: COMBINED ESOPHAGOSCOPY, GASTROSCOPY, DUODENOSCOPY (EGD), BIOPSY SINGLE OR MULTIPLE;  Surgeon: Babs Cassidy MD;  Location:  GI     GYN SURGERY  10/2/2015     LAPAROTOMY EXPLORATORY  10/2/15    Right salpingo-oophorectomy, omentectomy, appendectomy, lymph node biopsies, cancer staging      LAPAROTOMY, STAGING, COMBINED N/A 10/2/2015    Procedure: COMBINED LAPAROTOMY, STAGING;  Surgeon: Duyen Thayer MD;  Location:  OR         Health Maintenance Due   Topic Date Due     HIV SCREENING  07/07/2003     HEPATITIS B IMMUNIZATION (2 of 3 - 3-dose primary series) 01/26/2004     HEPATITIS C SCREENING  07/07/2006     PHQ-9  10/25/2019     PREVENTIVE CARE VISIT  04/25/2020       Current Medications:     Current Outpatient Medications   Medication Sig Dispense Refill     Cholecalciferol (VITAMIN D-3 PO)        levothyroxine (SYNTHROID/LEVOTHROID) 25 MCG tablet Take 1 tablet (25 mcg) by mouth daily 30 tablet 11     loratadine (CLARITIN)  10 MG tablet Take 1 tablet (10 mg) by mouth daily 30 tablet 1     MULTIPLE VITAMINS PO take  by mouth.       fluocinonide (LIDEX) 0.05 % external cream Apply a thin layer to affected area BID x 2 weeks, tapering with improvement. Do not apply to face or body folds. (Patient not taking: Reported on 10/29/2020) 30 g 0         Allergies:        Allergies   Allergen Reactions     Cleocin Rash     Keflex [Cephalexin Monohydrate] Rash     Zoloft         Social History:     Social History     Tobacco Use     Smoking status: Never Smoker     Smokeless tobacco: Never Used   Substance Use Topics     Alcohol use: Yes     Comment: Socially       History   Drug Use No         Family History:     The patient's family history is notable for     Family History   Problem Relation Age of Onset     Diabetes Mother         gestational     Thyroid Disease Mother      Blood Disease Mother         anemia     Cancer - colorectal Mother 54     Colon Cancer Mother         Doing well     Cancer Maternal Grandmother      Blood Disease Maternal Grandmother         cervical     Respiratory Maternal Grandmother         emphysema     Diabetes Maternal Grandfather              Cerebrovascular Disease Maternal Grandfather      Cancer Maternal Grandfather         kidney     Breast Cancer Paternal Grandmother              Diabetes Paternal Grandfather              Cerebrovascular Disease Paternal Grandfather      Skin Cancer Paternal Grandfather      Heart Disease Father 56        had angioplasty     Coronary Artery Disease Father         Had angioplasty in 2014         Physical Exam:     /69   Pulse 104   Temp 98.4  F (36.9  C) (Oral)   Resp 18   Wt 55.2 kg (121 lb 9.6 oz)   LMP 10/09/2020   SpO2 97%   BMI 21.54 kg/m    Body mass index is 21.54 kg/m .    General Appearance: healthy and alert, no distress     HEENT: no thyromegaly, no palpable nodules or masses        Cardiovascular: regular rate and rhythm,  no gallops, rubs or murmurs     Respiratory: lungs clear, no rales, rhonchi or wheezes    Musculoskeletal: extremities non tender and without edema    Skin: no lesions or rashes     Neurological: normal gait, no gross defects     Psychiatric: appropriate mood and affect                               Hematological: normal cervical, supraclavicular and inguinal lymph nodes     Gastrointestinal:       abdomen soft, non-tender, non-distended, no organomegaly or masses    Genitourinary: External genitalia and urethral meatus appears normal.  Vagina is smooth without nodularity or masses.  Cervix appears normal and without lesions. Bimanual exam reveal no masses, nodularity or fullness.  Recto-vaginal exam confirms these findings.      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 She completed treatment on 10/2/15 with an exploratory laparotomy, RSO, and fertility sparing staging. She presents today for an annual surveillance visit.    A total of 20 minutes was spent with the patient, over 50% minutes of which were spent in counseling the patient and/or treatment planning.      Plan:     1) History of borderline mucinous tumor of the right ovary: H&P without abnormal findings,  pending. Pelvic US reviewed with patient and Dr. Stratton. No concern in regards to left ovary size per Dr. Stratton. Patient would like to have a repeat TVUS in three months and in agreement for surveillance. Will order for three months and RTC with TVUS and  in one year. Encouraged patient to monitor her pelvic pain and if it occurs outside of ovulation and menses and is sustained to let our office know. Her blood tinged mucous 1-2 weeks after menses is normal around the time of ovulation. Reviewed signs and symptoms for when she should contact the clinic or seek additional care. Patient to contact the clinic with any questions or concerns in the  interim. Pap with HPV due 10/2024.     2) Genetic testing: Risk factors have been assessed and the patient does not meet qualifications for genetic counseling based on NCCN guidelines.     3) Labs:  and pelvic ultrasound; Influenza vaccine today in clinic.    4) Health maintenance issues discussed today include to follow up with PCP for co-morbid conditions and non-gynecologic issues.     5) Thyroid: newly diagnosed Hashimoto's thyroiditis. Oscar has started Synthroid for this. She will continue to follow up with endocrinology.       Alma Edwards, MSN, [facility]  Women's Health Nurse Practitioner  Department of Ob/Gyn and Women's Health  Division of Gynecologic Oncology  [facility]  Pager: [phone]      CC  Patient Care Team:  Tamica Durán APRN CNP as PCP - General (Family Practice)  Myra Gifford APRN CNP as Nurse Practitioner (Gynecologic Oncology)  Joe Stratton MD as MD (Oncology)  Devan Mcpherson MD as Assigned PCP      Chief Complaint   Patient presents with     Blood Draw     VPT blood draw and vitals      Venipuncture labs drawn from right arm         Again, thank you for allowing me to participate in the care of your patient.        Sincerely,        CLAUDIA Gonzalez CNP

## 2020-10-30 DIAGNOSIS — R59.9 ENLARGED LYMPH NODES: ICD-10-CM

## 2020-10-30 DIAGNOSIS — E01.0 THYROMEGALY: Primary | ICD-10-CM

## 2020-11-15 NOTE — LETTER
2017       RE: Kelley Serra  [address] [facility] [address]     Dear Colleague,    Thank you for referring your patient, Kelley Serra, to the [facility]. Please see a copy of my visit note below.                Follow Up Notes on Referred Patient    Date: 2017       Dr. Joe Stratton MD   [facility]  [address] [place] [address]       RE: Kelley Serra  : 1990  JAYASHREE: 2017    Dear Dr. 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 She completed treatment on 10/2/15 with an exploratory laparotomy, RSO, and fertility sparing staging.  She is here today for a surveillance visit.     Brief Oncology History:  16: Abdominal pain, 30cm pelvic mass.  73.  10/2/15: Sent to OR for ex lap, RSO, and fertility sparing staging. 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 Elected observation at this time.  16:  13  16:  9, Pap NIL  10/31/16:  10  17:  13  17:  10  17:  pending.         Today she comes to clinic feeling generally well. She denies any vaginal bleeding, no changes in her bowel or bladder habits, no nausea/emesis, no lower extremity edema, and no difficulties eating or sleeping. She denies any abdominal discomfort/bloating, no fevers or chills, and no chest pain or shortness of breath. She does continue to have some night sweats but no hot flashes. She did have night sweats prior to her surgery for a couple of years. Her menses started today. She is current with seeing her PCP for her exam and has been seen for her night sweats. She is sexually active and using condoms; she is leery of OCPs and desires to use condoms for now. She is not intending any pregnancies. She developed keloid scarring along her incision right after surgery. She is  Subjective   Patient ID: Char is a 6 year old female   : 2014   MRN: [id number]     Chief Complaint   Patient presents with   • Imm/Inj     Flu Vaccine       HISTORY OF PRESENT ILLNESS: Nurse visit for vaccination.    Past Medical History:   Diagnosis Date   • Atopic eczema 2012    EpiCeram, Betamethasone   • Closed traumatic minimally displaced metaphyseal torus fracture of distal end of left radius 2020    referred to orthopedic surgeon   • E. coli UTI (urinary tract infection) 10/13/2013    2/3/2015, 2015 Amoxil/clav, TMP/SMZ   • Strep tonsillitis 2018    3/12/2018 Amoxicillin       Patient Active Problem List   Diagnosis   • Atopic eczema       No outpatient medications have been marked as taking for the 20 encounter (Nurse Only) with [facility] L[facility].       ALLERGIES:  No Known Allergies    No visits with results within 1 Day(s) from this visit.   Latest known visit with results is:   No results found for any previous visit.       ASSESSMENT:  1. Need for vaccination        PLAN:    Diagnoses and all orders for this visit:  Need for vaccination  -     INFLUENZA QUADRIVALENT SPLIT PRES FREE 0.5 ML VACC, IM (FLULAVAL,FLUARIX,FLUZONE)    VIS was given to parent who gave consent to vaccinate.  Patient was observed after administration and no side effects noted.    Instructions provided as documented in the AVS.     The parent indicated understanding of the diagnosis and agreed with the plan of care.    Return for any concerns.                   looking forward to her 2 year baljit.         Review of Systems     Constitutional:  Positive for night sweats. Negative for fever, chills, weight loss, weight gain, fatigue, decreased appetite, recent stressors, height loss, post-operative complications, incisional pain, hallucinations, increased energy, hyperactivity and confused.   HENT:  Negative for ear pain, hearing loss, tinnitus, nosebleeds, trouble swallowing, hoarse voice, mouth sores, sore throat, ear discharge, tooth pain, gum tenderness, taste disturbance, smell disturbance, hearing aid, bleeding gums, dry mouth, sinus pain, sinus congestion and neck mass.    Eyes:  Negative for double vision, pain, redness, eye pain, decreased vision, eye watering, eye bulging, eye dryness, flashing lights, spots, floaters, strabismus, tunnel vision, jaundice and eye irritation.   Respiratory:   Negative for cough, hemoptysis, sputum production, shortness of breath, wheezing, sleep disturbances due to breathing, snores loudly, respiratory pain, dyspnea on exertion, cough disturbing sleep and postural dyspnea.    Cardiovascular:  Negative for chest pain, dyspnea on exertion, palpitations, orthopnea, claudication, leg swelling, fingers/toes turn blue, hypertension, hypotension, syncope, history of heart murmur, chest pain on exertion, chest pain at rest, pacemaker, few scattered varicosities, leg pain, sleep disturbances due to breathing, tachycardia, light-headedness, exercise intolerance and edema.   Gastrointestinal:  Negative for heartburn, nausea, vomiting, abdominal pain, diarrhea, constipation, blood in stool, melena, rectal pain, bloating, hemorrhoids, bowel incontinence, jaundice, rectal bleeding, coffee ground emesis and change in stool.   Genitourinary:  Negative for bladder incontinence, dysuria, urgency, hematuria, flank pain, vaginal discharge, difficulty urinating, genital sores, dyspareunia, decreased libido, nocturia, voiding less frequently, arousal  difficulty, abnormal vaginal bleeding, excessive menstruation, menstrual changes, hot flashes, vaginal dryness and postmenopausal bleeding.   Musculoskeletal:  Positive for neck pain. Negative for myalgias, back pain, joint swelling, arthralgias, stiffness, muscle cramps, bone pain, muscle weakness and fracture.   Skin:  Negative for nail changes, itching, poor wound healing, rash, hair changes, skin changes, acne, warts, poor wound healing, scarring, flaky skin, Raynaud's phenomenon, sensitivity to sunlight and skin thickening.   Neurological:  Negative for dizziness, tingling, tremors, speech change, seizures, loss of consciousness, weakness, light-headedness, numbness, headaches, disturbances in coordination, extremity numbness, memory loss, difficulty walking and paralysis.   Endo/Heme:  Negative for anemia, swollen glands and bruises/bleeds easily.   Psychiatric/Behavioral:  Negative for depression, hallucinations, memory loss, decreased concentration, mood swings and panic attacks.    Breast:  Negative for breast discharge, breast mass, breast pain and nipple retraction.   Endocrine:  Negative for altered temperature regulation, polyphagia, polydipsia, unwanted hair growth and change in facial hair.          Past Medical History:    Past Medical History:   Diagnosis Date     Allergic rhinitis due to animal dander      Allergy to mold spores     2/26/14 skin tests pos. for:  dog(+)/DM/M only and only on intradermals--ie, minimally allergic     Asthma, mild intermittent     last inhaler usage >6 years ago (4/11)     Diagnostic skin and sensitization tests 2/26/14 skin tests pos. 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    treated with RSO         Past Surgical History:    Past Surgical History:   Procedure Laterality Date     COLONOSCOPY N/A 12/2/2015    Procedure:  COLONOSCOPY;  Surgeon: Aaron Garrison MD;  Location:  GI     ESOPHAGOSCOPY, GASTROSCOPY, DUODENOSCOPY (EGD), COMBINED N/A 11/23/2015    Procedure: COMBINED ESOPHAGOSCOPY, GASTROSCOPY, DUODENOSCOPY (EGD), BIOPSY SINGLE OR MULTIPLE;  Surgeon: Babs Cassidy MD;  Location:  GI     LAPAROTOMY EXPLORATORY  10/2/15    Right salpingo-oophorectomy, omentectomy, appendectomy, lymph node biopsies, cancer staging      LAPAROTOMY, STAGING, COMBINED N/A 10/2/2015    Procedure: COMBINED LAPAROTOMY, STAGING;  Surgeon: Duyen Thayer MD;  Location:  OR         There are no preventive care reminders to display for this patient.    Current Medications:     Current Outpatient Prescriptions   Medication Sig Dispense Refill     FLUoxetine (PROZAC) 10 MG capsule TAKE 1 CAPSULE(10 MG) BY MOUTH DAILY 30 capsule 5     ALPRAZolam (XANAX) 0.25 MG tablet Take 1 tablet (0.25 mg) by mouth 2 times daily as needed for anxiety 40 tablet 0     MULTIPLE VITAMINS PO take  by mouth.           Allergies:        Allergies   Allergen Reactions     Cleocin Rash     Keflex [Cephalexin Monohydrate] Rash     Zoloft         Social History:     Social History   Substance Use Topics     Smoking status: Never Smoker     Smokeless tobacco: Never Used     Alcohol use No       History   Drug Use No         Family History:       Family History   Problem Relation Age of Onset     DIABETES Mother      gestational     Thyroid Disease Mother      Blood Disease Mother      anemia     Cancer - colorectal Mother 54     CANCER Maternal Grandmother      Blood Disease Maternal Grandmother      cervical     Respiratory Maternal Grandmother      emphysema     DIABETES Maternal Grandfather      CEREBROVASCULAR DISEASE Maternal Grandfather      CANCER Maternal Grandfather      kidney     Breast Cancer Paternal Grandmother      DIABETES Paternal Grandfather      HEART DISEASE Father 56     had angioplasty         Physical Exam:     /61  " Pulse 80  Temp 99  F (37.2  C) (Oral)  Resp 16  Ht 1.6 m (5' 3\")  Wt 52.8 kg (116 lb 6.4 oz)  SpO2 99%  BMI 20.62 kg/m2  Body mass index is 20.62 kg/(m^2).    General Appearance: healthy and alert, no distress     HEENT: no thyromegaly, no palpable nodules or masses        Cardiovascular: regular rate and rhythm, no gallops, rubs or murmurs     Respiratory: lungs clear, no rales, rhonchi or wheezes, normal diaphragmatic excursion    Musculoskeletal: extremities non tender and without edema    Skin: no lesions or rashes; prominent keloid midline incision     Neurological: normal gait, no gross defects     Psychiatric: appropriate mood and affect                               Hematological: normal cervical, supraclavicular and inguinal lymph nodes     Gastrointestinal:       abdomen soft, non-tender, non-distended, no organomegaly or masses    Genitourinary: External genitalia and urethral meatus appears normal.  Vagina is smooth without nodularity or masses.  Cervix appears normal and without lesions; menses.  Bimanual exam reveal no masses, nodularity or fullness. No CMT.      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 She completed treatment on 10/2/15 with an exploratory laparotomy, RSO, and fertility sparing staging.  She is here today for a surveillance visit.    20 minutes were spent with this patient, over 50% of that time was spent in symptom management, treatment planning and in counseling and coordination of care.    Plan:     1.)        Patient to RTC in 3 months for her next surveillance visit and ; today's is pending. At that visit she will be 2 years post surgery and can extend her surveillance to every 6 months x 3 additional years. Reviewed signs and symptoms for when she should contact the clinic or seek additional care. Patient to contact the clinic with any questions or concerns in the interim.   "   2.) Genetic risk factors were assessed and the patient does not meet the qualifications for a referral.      3.) Labs and/or tests ordered include:  .      4.) Health maintenance issues addressed today include annual health maintenance and non-gynecologic issues with PCP. Encouraged to follow up with PCP regarding night sweats and to check her thyroid.     CLAUDIA Bender, WHNP-BC, ANP-BC  Women's Health Nurse Practitioner  Adult Nurse Pracitioner  Gynecologic Oncology          CC  Patient Care Team:  Tamica Durán APRN CNP as PCP - General (Family Practice)  Myra Gifford APRN CNP as Nurse Practitioner (Gyn-Onc)  Joe Stratton MD as MD (Oncology)

## 2020-11-16 ENCOUNTER — HEALTH MAINTENANCE LETTER (OUTPATIENT)
Age: 30
End: 2020-11-16

## 2020-12-03 DIAGNOSIS — E06.3 HASHIMOTO'S THYROIDITIS: ICD-10-CM

## 2020-12-03 DIAGNOSIS — D39.11 OVARIAN TUMOR OF BORDERLINE MALIGNANCY, RIGHT: ICD-10-CM

## 2020-12-03 PROCEDURE — 86304 IMMUNOASSAY TUMOR CA 125: CPT | Performed by: INTERNAL MEDICINE

## 2020-12-03 PROCEDURE — 84443 ASSAY THYROID STIM HORMONE: CPT | Performed by: INTERNAL MEDICINE

## 2020-12-03 PROCEDURE — 36415 COLL VENOUS BLD VENIPUNCTURE: CPT | Performed by: INTERNAL MEDICINE

## 2020-12-04 DIAGNOSIS — D39.11 OVARIAN TUMOR OF BORDERLINE MALIGNANCY, RIGHT: Primary | ICD-10-CM

## 2020-12-04 LAB
CANCER AG125 SERPL-ACNC: 24 U/ML (ref 0–30)
TSH SERPL DL<=0.005 MIU/L-ACNC: 2 MU/L (ref 0.4–4)

## 2020-12-04 RX ORDER — IOHEXOL 140 MG/ML
50 INJECTION INTRAVENOUS ONCE
Qty: 50 ML | Refills: 0 | Status: SHIPPED | OUTPATIENT
Start: 2020-12-04 | End: 2020-12-04

## 2020-12-04 NOTE — RESULT ENCOUNTER NOTE
"Spoke with patient and she does report abdominal pain unrelated to her menstrual cycle over the last month or two. She states that her periods are regular. She stated that the abdominal pain is the \"entire abdomen\" and right before a bowel movement. She states that the pain is \"so intense\". She is having a bowel movement daily and is regular. The pain is intermittent and goes away after she goes to the bathroom but will return a few times during the day unrelated to having a bm. She has been eliminating gluten lately in her diet and has increased her legume/bean intake. She rates her abdominal pain to be a 7/10. She states that this resolves after a bowel movement and she does not take any medication for this. She states that this has been going on 1-2 months. She denies bloating or irregular vaginal bleeding or bleeding with bowel movements. She denies any irregular vaginal bleeding, no changes in her bladder habits, no nausea/emesis, no lower extremity edema, and no difficulties  eating or sleeping, no fevers or chills.     Will order imaging and send message to scheduling given her symptoms and rise in ."

## 2020-12-08 ENCOUNTER — ANCILLARY PROCEDURE (OUTPATIENT)
Dept: CT IMAGING | Facility: CLINIC | Age: 30
End: 2020-12-08
Attending: OBSTETRICS & GYNECOLOGY
Payer: COMMERCIAL

## 2020-12-08 DIAGNOSIS — D39.11 OVARIAN TUMOR OF BORDERLINE MALIGNANCY, RIGHT: ICD-10-CM

## 2020-12-08 PROCEDURE — 71260 CT THORAX DX C+: CPT | Performed by: RADIOLOGY

## 2020-12-08 PROCEDURE — 74177 CT ABD & PELVIS W/CONTRAST: CPT | Performed by: RADIOLOGY

## 2020-12-08 RX ORDER — IOPAMIDOL 755 MG/ML
74 INJECTION, SOLUTION INTRAVASCULAR ONCE
Status: COMPLETED | OUTPATIENT
Start: 2020-12-08 | End: 2020-12-08

## 2020-12-08 RX ADMIN — IOPAMIDOL 74 ML: 755 INJECTION, SOLUTION INTRAVASCULAR at 12:27

## 2020-12-08 NOTE — RESULT ENCOUNTER NOTE
US reviewed. Spoke with radiologist at [facility] and confirmed that the calcified granuloma in left upper lung is benign and does not need to be followed. Explained this to Oscar over the phone along with her results showing a benign CT.   Will cancel Jan 2021 pelvic US and keep the October 2021 pelvic US. Message sent to scheduling.   She reports that she has been feeling better from an abdominal discomfort standpoint. Will keep her f/u as 10/2021. She is aware to call in the interim with any issues.

## 2020-12-10 ENCOUNTER — VIRTUAL VISIT (OUTPATIENT)
Dept: ENDOCRINOLOGY | Facility: CLINIC | Age: 30
End: 2020-12-10
Payer: COMMERCIAL

## 2020-12-10 DIAGNOSIS — E06.3 HASHIMOTO'S THYROIDITIS: ICD-10-CM

## 2020-12-10 DIAGNOSIS — R59.9 ENLARGED LYMPH NODES: Primary | ICD-10-CM

## 2020-12-10 PROCEDURE — 99213 OFFICE O/P EST LOW 20 MIN: CPT | Mod: 95 | Performed by: INTERNAL MEDICINE

## 2020-12-10 RX ORDER — LEVOTHYROXINE SODIUM 25 UG/1
37.5 TABLET ORAL DAILY
Qty: 45 TABLET | Refills: 11 | Status: SHIPPED | OUTPATIENT
Start: 2020-12-10 | End: 2021-06-18

## 2020-12-10 NOTE — LETTER
"    12/10/2020         RE: Kelley Serra  [address]  [place] [facility] [address]        Dear Colleague,    Thank you for referring your patient, Kelley Serra, to the [facility]. Please see a copy of my visit note below.    Kelley Serra is a 30 year old female who is being evaluated via a billable telephone visit.      The patient has been notified of following:     \"This telephone visit will be conducted via a call between you and your physician/provider. We have found that certain health care needs can be provided without the need for a physical exam.  This service lets us provide the care you need with a short phone conversation.  If a prescription is necessary we can send it directly to your pharmacy.  If lab work is needed we can place an order for that and you can then stop by our lab to have the test done at a later time.    Telephone visits are billed at different rates depending on your insurance coverage. During this emergency period, for some insurers they may be billed the same as an in-person visit.  Please reach out to your insurance provider with any questions.    If during the course of the call the physician/provider feels a telephone visit is not appropriate, you will not be charged for this service.\"    Patient has given verbal consent for Telephone visit?  Yes    What phone number would you like to be contacted at? [phone]    How would you like to obtain your AVS? Lucian    S:   Patient presents for evaluation of abnormal thyroid function tests.   She asked to have labs drawn due to concerns about \"brain fog\", dry skin, and fatigue over the last five years.   Over the last 15 months, she has noticed a \"tightness\" in her throat.     No recent changes in weight.   Sleeps 8 hours a night. She does not toss or turn.     Occasional palpitations when she is lying down.   No SOB.     Menses are regular. Notes they seeming to last longer recently.   No " children. No immediate plans for children.   No dysphagia or changes in speech.     She has a history of stage 1 ovarian cancer.   She has a right oophorectomy.     In 12/2020, she is on 25 mcg of levothyroxine daily.   Notes her eyelashes are not falling out as much since starting medication.   No significant change in fatigue.     ROS: 10 point ROS neg other than the symptoms noted above in the HPI.    A/P:   Abnormal thyroid function tests - Positive TPO antibody. Explained this means she will develop hypothyroidism in the next 10 years. Extensive discussion of thyroid hormone and normal physiology. Included was discussion of thyroid in relation to weight and energy. She is not actively pursuing pregnancy. As per her neck discomfort, this could be due to thyroid enlargement from Hashimoto's thyroiditis or a nodule.   US in 10/2020 showed small lymph nodes and possibly a parathyroid adenoma.   Her calcium and iPTH were normal.  TSH improved to 2.00 with 25 mcg of levothyroxine. Hair has improved.   -Increase levothyroxine to 1.5 tabs (37.5 mcg daily).   -Labs in 4 weeks.   -Repeat US in 1/2021 to monitor lymph nodes.     Due to the COVID 19 pandemic this visit was a telephone/video visit in order to help prevent spread of infection in this high risk patient and the general population. The patient gave verbal consent for the visit today.    Start time 0900  Stop time 0916  Total time 16  This visit would have been billed as 01315 as an E & M code     Devan Mcpherson MD on 12/10/2020 at 9:16 AM                    Again, thank you for allowing me to participate in the care of your patient.        Sincerely,        Devan Mcpherson MD

## 2020-12-10 NOTE — PROGRESS NOTES
"Kelley Serra is a 30 year old female who is being evaluated via a billable telephone visit.      The patient has been notified of following:     \"This telephone visit will be conducted via a call between you and your physician/provider. We have found that certain health care needs can be provided without the need for a physical exam.  This service lets us provide the care you need with a short phone conversation.  If a prescription is necessary we can send it directly to your pharmacy.  If lab work is needed we can place an order for that and you can then stop by our lab to have the test done at a later time.    Telephone visits are billed at different rates depending on your insurance coverage. During this emergency period, for some insurers they may be billed the same as an in-person visit.  Please reach out to your insurance provider with any questions.    If during the course of the call the physician/provider feels a telephone visit is not appropriate, you will not be charged for this service.\"    Patient has given verbal consent for Telephone visit?  Yes    What phone number would you like to be contacted at? [phone]    How would you like to obtain your AVS? Lucian    S:   Patient presents for evaluation of abnormal thyroid function tests.   She asked to have labs drawn due to concerns about \"brain fog\", dry skin, and fatigue over the last five years.   Over the last 15 months, she has noticed a \"tightness\" in her throat.     No recent changes in weight.   Sleeps 8 hours a night. She does not toss or turn.     Occasional palpitations when she is lying down.   No SOB.     Menses are regular. Notes they seeming to last longer recently.   No children. No immediate plans for children.   No dysphagia or changes in speech.     She has a history of stage 1 ovarian cancer.   She has a right oophorectomy.     In 12/2020, she is on 25 mcg of levothyroxine daily.   Notes her eyelashes are not falling out as " much since starting medication.   No significant change in fatigue.     ROS: 10 point ROS neg other than the symptoms noted above in the HPI.    A/P:   Abnormal thyroid function tests - Positive TPO antibody. Explained this means she will develop hypothyroidism in the next 10 years. Extensive discussion of thyroid hormone and normal physiology. Included was discussion of thyroid in relation to weight and energy. She is not actively pursuing pregnancy. As per her neck discomfort, this could be due to thyroid enlargement from Hashimoto's thyroiditis or a nodule.   US in 10/2020 showed small lymph nodes and possibly a parathyroid adenoma.   Her calcium and iPTH were normal.  TSH improved to 2.00 with 25 mcg of levothyroxine. Hair has improved.   -Increase levothyroxine to 1.5 tabs (37.5 mcg daily).   -Labs in 4 weeks.   -Repeat US in 1/2021 to monitor lymph nodes.     Due to the COVID 19 pandemic this visit was a telephone/video visit in order to help prevent spread of infection in this high risk patient and the general population. The patient gave verbal consent for the visit today.    Start time 0900  Stop time 0916  Total time 16  This visit would have been billed as [id number] as an E & M code     Devan Mcpherson MD on 12/10/2020 at 9:16 AM

## 2021-01-26 ENCOUNTER — ANCILLARY PROCEDURE (OUTPATIENT)
Dept: ULTRASOUND IMAGING | Facility: CLINIC | Age: 31
End: 2021-01-26
Attending: INTERNAL MEDICINE
Payer: COMMERCIAL

## 2021-01-26 DIAGNOSIS — R59.9 ENLARGED LYMPH NODES: ICD-10-CM

## 2021-01-26 DIAGNOSIS — E06.3 HASHIMOTO'S THYROIDITIS: ICD-10-CM

## 2021-01-26 LAB — TSH SERPL DL<=0.005 MIU/L-ACNC: 1.67 MU/L (ref 0.4–4)

## 2021-01-26 PROCEDURE — 84443 ASSAY THYROID STIM HORMONE: CPT | Performed by: INTERNAL MEDICINE

## 2021-01-26 PROCEDURE — 36415 COLL VENOUS BLD VENIPUNCTURE: CPT | Performed by: INTERNAL MEDICINE

## 2021-02-01 ENCOUNTER — VIRTUAL VISIT (OUTPATIENT)
Dept: ENDOCRINOLOGY | Facility: CLINIC | Age: 31
End: 2021-02-01
Payer: COMMERCIAL

## 2021-02-01 DIAGNOSIS — E55.9 VITAMIN D DEFICIENCY: ICD-10-CM

## 2021-02-01 DIAGNOSIS — R53.83 OTHER FATIGUE: ICD-10-CM

## 2021-02-01 DIAGNOSIS — E06.3 HASHIMOTO'S THYROIDITIS: Primary | ICD-10-CM

## 2021-02-01 DIAGNOSIS — R59.9 ENLARGED LYMPH NODES: ICD-10-CM

## 2021-02-01 DIAGNOSIS — Z78.9 VEGAN DIET: ICD-10-CM

## 2021-02-01 PROCEDURE — 99214 OFFICE O/P EST MOD 30 MIN: CPT | Mod: 95 | Performed by: INTERNAL MEDICINE

## 2021-02-01 NOTE — LETTER
"    2/1/2021         RE: Kelley Serra  [address]  [facility] [address]        Dear Colleague,    Thank you for referring your patient, Kelley Serra, to the [facility] ENDOCRINOLOGY. Please see a copy of my visit note below.    Oscar is a 30 year old who is being evaluated via a billable telephone visit.      What phone number would you like to be contacted at? [phone]  How would you like to obtain your AVS? Lucian    S:   Patient presents for evaluation of abnormal thyroid function tests.   She asked to have labs drawn due to concerns about \"brain fog\", dry skin, and fatigue over the last five years.   Over the last 15 months, she has noticed a \"tightness\" in her throat.     No recent changes in weight.   Sleeps 8 hours a night. She does not toss or turn.     Occasional palpitations when she is lying down.   No SOB.     Menses are regular. Notes they seeming to last longer recently.   No children. No immediate plans for children.   No dysphagia or changes in speech.     She has a history of stage 1 ovarian cancer.   She has a right oophorectomy.     In 12/2020, she is on 25 mcg of levothyroxine daily.   Notes her eyelashes are not falling out as much since starting medication.   No significant change in fatigue.     In 2/2021, taking 37.5 mcg of levothyroxine daily.   No change in fatigue.   She sleeps 8 hours a night. Not refreshed in the AM.   No snoring. No tossing and turning.   No change in weight.   Prior issues with IBS resolved with stopping dairy. Recently more GI upset.   She is a vegan. Takes B12 tablets.   No black or bloody stools.  Admits to feelings of depression but feels this is related to being upset about her health problems.   She was on prozac for ~ 2 years and did not feel it had an effect. She was not in therapy.     ROS: 10 point ROS neg other than the symptoms noted above in the HPI.    A/P:   Abnormal thyroid function tests - Positive TPO antibody. " Explained this means she will develop hypothyroidism in the next 10 years. Extensive discussion of thyroid hormone and normal physiology. Included was discussion of thyroid in relation to weight and energy. She is not actively pursuing pregnancy. As per her neck discomfort, this could be due to thyroid enlargement from Hashimoto's thyroiditis or a nodule.   US in 10/2020 showed small lymph nodes and possibly a parathyroid adenoma.   Her calcium and iPTH were normal.  TSH improved to 2.00 with 25 mcg of levothyroxine. Hair has improved.   US in 1/2021 was normal.   TSH 1.67 on 37.5 mcg of levothyroxine every day. No change in fatigue. We spoke about alternative diagnoses: vitamin D deficiency, iron deficiency, B12 deficiency, AI. Recent normal CBC.   -Check vitamin D, vitamin B12, iron, cortisol, ACTH.   -Check CMP.   -No change to levothyroxine today. If above normal, we will increase to 50 mcg daily.     Due to the COVID 19 pandemic this visit was a telephone/video visit in order to help prevent spread of infection in this high risk patient and the general population. The patient gave verbal consent for the visit today.    I have independently reviewed and interpreted labs, imaging as indicated.     Visit Start time  1130  Visit Stop time 1151  21 minutes spent on the date of the encounter doing chart review, history and exam, documentation and further activities as noted above.   If this were an in person visit, it would be billed as [id number].     Devan Mcpherson MD on 2/1/2021 at 11:52 AM        Again, thank you for allowing me to participate in the care of your patient.        Sincerely,        Devan Mcpherson MD

## 2021-02-01 NOTE — PROGRESS NOTES
"Oscar is a 30 year old who is being evaluated via a billable telephone visit.      What phone number would you like to be contacted at? [phone]  How would you like to obtain your AVS? Lucian    S:   Patient presents for evaluation of abnormal thyroid function tests.   She asked to have labs drawn due to concerns about \"brain fog\", dry skin, and fatigue over the last five years.   Over the last 15 months, she has noticed a \"tightness\" in her throat.     No recent changes in weight.   Sleeps 8 hours a night. She does not toss or turn.     Occasional palpitations when she is lying down.   No SOB.     Menses are regular. Notes they seeming to last longer recently.   No children. No immediate plans for children.   No dysphagia or changes in speech.     She has a history of stage 1 ovarian cancer.   She has a right oophorectomy.     In 12/2020, she is on 25 mcg of levothyroxine daily.   Notes her eyelashes are not falling out as much since starting medication.   No significant change in fatigue.     In 2/2021, taking 37.5 mcg of levothyroxine daily.   No change in fatigue.   She sleeps 8 hours a night. Not refreshed in the AM.   No snoring. No tossing and turning.   No change in weight.   Prior issues with IBS resolved with stopping dairy. Recently more GI upset.   She is a vegan. Takes B12 tablets.   No black or bloody stools.  Admits to feelings of depression but feels this is related to being upset about her health problems.   She was on prozac for ~ 2 years and did not feel it had an effect. She was not in therapy.     ROS: 10 point ROS neg other than the symptoms noted above in the HPI.    A/P:   Abnormal thyroid function tests - Positive TPO antibody. Explained this means she will develop hypothyroidism in the next 10 years. Extensive discussion of thyroid hormone and normal physiology. Included was discussion of thyroid in relation to weight and energy. She is not actively pursuing pregnancy. As per her neck " discomfort, this could be due to thyroid enlargement from Hashimoto's thyroiditis or a nodule.   US in 10/2020 showed small lymph nodes and possibly a parathyroid adenoma.   Her calcium and iPTH were normal.  TSH improved to 2.00 with 25 mcg of levothyroxine. Hair has improved.   US in 1/2021 was normal.   TSH 1.67 on 37.5 mcg of levothyroxine every day. No change in fatigue. We spoke about alternative diagnoses: vitamin D deficiency, iron deficiency, B12 deficiency, AI. Recent normal CBC.   -Check vitamin D, vitamin B12, iron, cortisol, ACTH.   -Check CMP.   -No change to levothyroxine today. If above normal, we will increase to 50 mcg daily.     Due to the COVID 19 pandemic this visit was a telephone/video visit in order to help prevent spread of infection in this high risk patient and the general population. The patient gave verbal consent for the visit today.    I have independently reviewed and interpreted labs, imaging as indicated.     Visit Start time  1130  Visit Stop time 1151  21 minutes spent on the date of the encounter doing chart review, history and exam, documentation and further activities as noted above.   If this were an in person visit, it would be billed as [id number].     Devan Mcpherson MD on 2/1/2021 at 11:52 AM

## 2021-02-04 DIAGNOSIS — Z78.9 VEGAN DIET: ICD-10-CM

## 2021-02-04 DIAGNOSIS — R53.83 OTHER FATIGUE: ICD-10-CM

## 2021-02-04 DIAGNOSIS — E55.9 VITAMIN D DEFICIENCY: ICD-10-CM

## 2021-02-04 LAB
ALBUMIN SERPL-MCNC: 4.4 G/DL (ref 3.4–5)
ALP SERPL-CCNC: 52 U/L (ref 40–150)
ALT SERPL W P-5'-P-CCNC: 20 U/L (ref 0–50)
ANION GAP SERPL CALCULATED.3IONS-SCNC: 7 MMOL/L (ref 3–14)
AST SERPL W P-5'-P-CCNC: 18 U/L (ref 0–45)
BILIRUB SERPL-MCNC: 0.5 MG/DL (ref 0.2–1.3)
BUN SERPL-MCNC: 10 MG/DL (ref 7–30)
CALCIUM SERPL-MCNC: 9.4 MG/DL (ref 8.5–10.1)
CHLORIDE SERPL-SCNC: 103 MMOL/L (ref 94–109)
CO2 SERPL-SCNC: 25 MMOL/L (ref 20–32)
CORTIS SERPL-MCNC: 9.6 UG/DL (ref 4–22)
CREAT SERPL-MCNC: 0.76 MG/DL (ref 0.52–1.04)
DEPRECATED CALCIDIOL+CALCIFEROL SERPL-MC: 40 UG/L (ref 20–75)
GFR SERPL CREATININE-BSD FRML MDRD: >90 ML/MIN/{1.73_M2}
GLUCOSE SERPL-MCNC: 94 MG/DL (ref 70–99)
IRON SATN MFR SERPL: 22 % (ref 15–46)
IRON SERPL-MCNC: 86 UG/DL (ref 35–180)
POTASSIUM SERPL-SCNC: 4 MMOL/L (ref 3.4–5.3)
PROT SERPL-MCNC: 8.1 G/DL (ref 6.8–8.8)
SODIUM SERPL-SCNC: 135 MMOL/L (ref 133–144)
TIBC SERPL-MCNC: 387 UG/DL (ref 240–430)
VIT B12 SERPL-MCNC: 1202 PG/ML (ref 193–986)

## 2021-02-04 PROCEDURE — 82607 VITAMIN B-12: CPT | Performed by: INTERNAL MEDICINE

## 2021-02-04 PROCEDURE — 82306 VITAMIN D 25 HYDROXY: CPT | Performed by: INTERNAL MEDICINE

## 2021-02-04 PROCEDURE — 83550 IRON BINDING TEST: CPT | Performed by: INTERNAL MEDICINE

## 2021-02-04 PROCEDURE — 82024 ASSAY OF ACTH: CPT | Performed by: INTERNAL MEDICINE

## 2021-02-04 PROCEDURE — 80053 COMPREHEN METABOLIC PANEL: CPT | Performed by: INTERNAL MEDICINE

## 2021-02-04 PROCEDURE — 82533 TOTAL CORTISOL: CPT | Performed by: INTERNAL MEDICINE

## 2021-02-04 PROCEDURE — 36415 COLL VENOUS BLD VENIPUNCTURE: CPT | Performed by: INTERNAL MEDICINE

## 2021-02-04 PROCEDURE — 83540 ASSAY OF IRON: CPT | Performed by: INTERNAL MEDICINE

## 2021-02-05 ENCOUNTER — TELEPHONE (OUTPATIENT)
Dept: ENDOCRINOLOGY | Facility: CLINIC | Age: 31
End: 2021-02-05

## 2021-02-05 DIAGNOSIS — R53.83 OTHER FATIGUE: Primary | ICD-10-CM

## 2021-02-05 LAB — ACTH PLAS-MCNC: <10 PG/ML

## 2021-02-05 NOTE — TELEPHONE ENCOUNTER
RN called to schedule patient a cosyntropin test. An apt was made for 2/19/21.    Patient had a question about her vitamin B12 level which was 1,202. It was normal 1 year ago and elevated 5 years ago at 1,291. She is wondering if there any significance to this or if it anything to be concerned about.    Ced BALDERRAMA RN....2/5/2021 3:02 PM

## 2021-02-08 NOTE — TELEPHONE ENCOUNTER
Per routing comment:    No concerns about the B12. It is a water soluble vitamin, any excess will be lost in the urine     Devan Mcpherson MD on 2/8/2021 at 7:36 AM

## 2021-02-09 NOTE — TELEPHONE ENCOUNTER
Patient returned the call and was informed of Dr. Mcpherson's message below. She verbalized understanding and has no further questions.    Ced BALDERRAMA RN....2/9/2021 9:57 AM

## 2021-02-19 ENCOUNTER — ALLIED HEALTH/NURSE VISIT (OUTPATIENT)
Dept: NURSING | Facility: CLINIC | Age: 31
End: 2021-02-19
Payer: COMMERCIAL

## 2021-02-19 DIAGNOSIS — R53.83 FATIGUE: Primary | ICD-10-CM

## 2021-02-19 DIAGNOSIS — R53.83 OTHER FATIGUE: ICD-10-CM

## 2021-02-19 LAB
CORTICOSTER 1H P 250 UG ACTH SERPL-SCNC: 28 UG/DL
CORTICOSTER 30M P 250 UG ACTH SERPL-SCNC: 23 UG/DL
CORTICOSTER SERPL-MCNC: 13.2 UG/DL (ref 4–22)

## 2021-02-19 PROCEDURE — 96372 THER/PROPH/DIAG INJ SC/IM: CPT

## 2021-02-19 PROCEDURE — 82533 TOTAL CORTISOL: CPT | Performed by: INTERNAL MEDICINE

## 2021-02-19 PROCEDURE — 36415 COLL VENOUS BLD VENIPUNCTURE: CPT | Performed by: INTERNAL MEDICINE

## 2021-02-19 PROCEDURE — 82533 TOTAL CORTISOL: CPT | Mod: 91 | Performed by: INTERNAL MEDICINE

## 2021-02-19 RX ORDER — COSYNTROPIN 0.25 MG/ML
0.25 INJECTION, POWDER, LYOPHILIZED, FOR SOLUTION INTRAMUSCULAR; INTRAVENOUS ONCE
Status: COMPLETED | OUTPATIENT
Start: 2021-02-19 | End: 2021-02-19

## 2021-02-19 RX ADMIN — COSYNTROPIN 0.25 MG: 0.25 INJECTION, POWDER, LYOPHILIZED, FOR SOLUTION INTRAMUSCULAR; INTRAVENOUS at 09:28

## 2021-02-19 NOTE — PROGRESS NOTES
Patient presented for cosyntropin stimulation testing. Lab was drawn at baseline prior to IM injection of cosyntropin. Labs were then drawn at 30 minutes and 60 minutes after the injection. Patient was advised that we will call with the results.      Clinic Administered Medication Documentation      Injectable Medication Documentation    Patient was given IM injection of 0.25mg Cosyntropin into left deltoid. Prior to medication administration, verified patients identity using patient s name and date of birth. Please see MAR and medication order for additional information. Patient instructed to report any adverse reaction to staff immediately .      Was entire vial of medication used? Yes  Vial/Syringe: Syringe  Expiration Date:  06/2021  Was this medication supplied by the patient? No     Diluent: 1 ml 0.9% sodium chloride  Exp date:11/1/21  Lot #: [id number]  NDC:[id number]    Ced BALDERRAMA RN....2/19/2021 9:26 AM

## 2021-02-22 DIAGNOSIS — E06.3 HASHIMOTO'S THYROIDITIS: Primary | ICD-10-CM

## 2021-02-22 RX ORDER — LEVOTHYROXINE SODIUM 50 UG/1
50 TABLET ORAL DAILY
Qty: 90 TABLET | Refills: 3 | Status: SHIPPED | OUTPATIENT
Start: 2021-02-22 | End: 2022-11-02

## 2021-05-23 ENCOUNTER — PRE VISIT (OUTPATIENT)
Dept: NEUROLOGY | Facility: CLINIC | Age: 31
End: 2021-05-23

## 2021-05-23 NOTE — TELEPHONE ENCOUNTER
FUTURE VISIT INFORMATION      FUTURE VISIT INFORMATION:    Date: 6/4/2021    Time: 7am    Location: [facility]  REFERRAL INFORMATION:    Referring provider:  Self    Referring providers clinic:      Reason for visit/diagnosis  Memory Change     RECORDS REQUESTED FROM:       Clinic name Comments Records Status Imaging Status   Internal Dr. Jimenez-10/6/2020    [facility]-4/25/2019    CT Head-5/7/2016 Epic PACS

## 2021-05-27 ASSESSMENT — ENCOUNTER SYMPTOMS
HYPERTENSION: 0
SLEEP DISTURBANCES DUE TO BREATHING: 0
LEG PAIN: 0
HYPOTENSION: 0
LIGHT-HEADEDNESS: 0
SYNCOPE: 0
PALPITATIONS: 0
EXERCISE INTOLERANCE: 0
ORTHOPNEA: 0

## 2021-05-28 DIAGNOSIS — E06.3 HASHIMOTO'S THYROIDITIS: ICD-10-CM

## 2021-05-28 LAB — TSH SERPL DL<=0.005 MIU/L-ACNC: 0.69 MU/L (ref 0.4–4)

## 2021-05-28 PROCEDURE — 84443 ASSAY THYROID STIM HORMONE: CPT | Performed by: INTERNAL MEDICINE

## 2021-05-28 PROCEDURE — 36415 COLL VENOUS BLD VENIPUNCTURE: CPT | Performed by: INTERNAL MEDICINE

## 2021-05-29 ENCOUNTER — MYC MEDICAL ADVICE (OUTPATIENT)
Dept: FAMILY MEDICINE | Facility: CLINIC | Age: 31
End: 2021-05-29

## 2021-05-29 DIAGNOSIS — K63.8219 SMALL INTESTINAL BACTERIAL OVERGROWTH: Primary | ICD-10-CM

## 2021-06-04 ENCOUNTER — OFFICE VISIT (OUTPATIENT)
Dept: NEUROLOGY | Facility: CLINIC | Age: 31
End: 2021-06-04
Payer: COMMERCIAL

## 2021-06-04 VITALS
OXYGEN SATURATION: 100 % | DIASTOLIC BLOOD PRESSURE: 70 MMHG | HEIGHT: 63 IN | RESPIRATION RATE: 16 BRPM | WEIGHT: 124 LBS | SYSTOLIC BLOOD PRESSURE: 107 MMHG | BODY MASS INDEX: 21.97 KG/M2 | HEART RATE: 84 BPM

## 2021-06-04 DIAGNOSIS — R41.89 SUBJECTIVE COGNITIVE IMPAIRMENT: ICD-10-CM

## 2021-06-04 DIAGNOSIS — M54.81 OCCIPITAL NEURALGIA OF LEFT SIDE: Primary | ICD-10-CM

## 2021-06-04 PROCEDURE — 99205 OFFICE O/P NEW HI 60 MIN: CPT | Performed by: PSYCHIATRY & NEUROLOGY

## 2021-06-04 ASSESSMENT — MIFFLIN-ST. JEOR: SCORE: 1241.59

## 2021-06-04 ASSESSMENT — PAIN SCALES - GENERAL: PAINLEVEL: NO PAIN (0)

## 2021-06-04 NOTE — PATIENT INSTRUCTIONS
Brain fog: No signs of memory deficits on exam today, but attention is poor and some issues with complex visual based tasks.  - See OT for MoCA and cognitive testing  - Consider serum studies in the future pending other tests    Neck pain, head pain, Hx of Headache, Hx of migraine:  Your symptoms at this moment are consistent with occipital neuralgia.  However, given your age/race/sex and history of atypical head pain, further evaluation with imaging of the brain and neck for other etiologies is needed along with treatment for occipital neuralgia.  - MRI brain and cervical spine  - Physiatry referral for lidocaine block of greater occipital nerve on the left

## 2021-06-04 NOTE — PROGRESS NOTES
[facility] Neurology Consultation    Kelley Serra MRN# [id number]   Age: 30 year old YOB: 1990     Requesting physician: Tamica Durán  [facility], [facility]     Reason for Consultation: memory changes      History of Presenting Symptoms:   Kelley Serra is a 30 year old female who presents today for evaluation of memory changes.  The patient has a pertinent medical history of Hashimotos's thyroiditis (+TPO, on levothyroxine), stage 1A ovarian cancer s/p R-oophorectomy,  And generalized anxiety.  During visits with endocrinology, the patient reported a 5 year history of cognitive/functional dysfunction, dry skin, and fatigue.  Prior treatment with prozac for 2 years wasn't helpful for depression, she is vegan and takes b12 supplements, and she sleeps 8 hours per night.    Today, the patient reports weird symptoms since 2014.  She has described difficulty with focus relating to information retention, saying the wrong words at times in conversation.  The patient's mother was diagnosed with cancer in 2014, which did cause some stress in the patient's life, but otherwise no major other events occurred at that time.       The patient has had headaches and migraines in her past.  She describes her last migraines occurring in 2016, and described waking up with extreme pain (not being able to see out of one eye (can't recall which eye)). The pain was throughout her whole head.  They would last a few hours, and go away with sleep and Excedrin.  She had associated nausea, tiredness, and felt flu-kelley.  These started in 2014 and went through 2016, and didn't correlate with menses.      Her headache history is longer than her migraine history.  She had a great deal of headaches as a child (5y/o).  She went through multiple w/ups through outside facilities and reports that she had imaging (I don't see any imaging of the head earlier than 2016).  Previously, headaches were described as head pressure on  the bi-temporal and frontal regions.  They lacked nausea, dizziness, photophobia, phonophobia, and were described as throbbing pain lasting a few hours.  The headaches weren't triggered by body position, light, time of day, stress, foods, lack of sleep.  The headaches changed from this presentation, to left sided headaches one year ago.  She has had headaches on the back left occipital portion of her her head.  These headaches are happening twice a week.  She doesn't describe any neck injury, MVA, or striking of her head.  There are no other associated symptoms with these headaches.  They are still described as throbbing pain that is relieved somewhat with acetaminophen.    The patient doesn't describe any seizure history as a child, had a normal development as a child, no history of meningitis or encephalitis, and has no family history of seizure disorders.  She does report increased fatigue and being tired easily with basic exercise.  She doesn't have any numbness/tingling/weakness in her legs/arms.  She has no deficits with swallowing.  She has no diplopia.        Past Medical History:     Past Medical History:   Diagnosis Date     Allergic rhinitis due to animal dander      Allergy to mold spores     2/26/14 skin tests pos. for:  dog(+)/DM/M only and only on intradermals--ie, minimally allergic     Asthma, mild intermittent     last inhaler usage >6 years ago (4/11)     Cancer (H) 9/2015    Stage 1A ovarian cancer     Diagnostic skin and sensitization tests 2/26/14 skin tests pos. 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    treated with RSO      Past Surgical History:     Past Surgical History:   Procedure Laterality Date     APPENDECTOMY  10/2/2015     BIOPSY  10/2/2015     COLONOSCOPY N/A 12/2/2015    Procedure: COLONOSCOPY;  Surgeon: Aaron Garrison MD;   "Location:  GI     ESOPHAGOSCOPY, GASTROSCOPY, DUODENOSCOPY (EGD), COMBINED N/A 11/23/2015    Procedure: COMBINED ESOPHAGOSCOPY, GASTROSCOPY, DUODENOSCOPY (EGD), BIOPSY SINGLE OR MULTIPLE;  Surgeon: Babs Cassidy MD;  Location:  GI     GYN SURGERY  10/2/2015     LAPAROTOMY EXPLORATORY  10/2/15    Right salpingo-oophorectomy, omentectomy, appendectomy, lymph node biopsies, cancer staging      LAPAROTOMY, STAGING, COMBINED N/A 10/2/2015    Procedure: COMBINED LAPAROTOMY, STAGING;  Surgeon: Duyen Thayer MD;  Location:  OR      Social History:     Tobacco Use     Smoking status: Never Smoker     Smokeless tobacco: Never Used   Substance Use Topics     Alcohol use: Yes     Comment: Socially     Drug use: No      Medications:     Current Outpatient Medications   Medication Sig     Cholecalciferol (VITAMIN D-3 PO)      fluocinonide (LIDEX) 0.05 % external cream Apply a thin layer to affected area BID x 2 weeks, tapering with improvement. Do not apply to face or body folds. (Patient not taking: Reported on 10/29/2020)     levothyroxine (SYNTHROID/LEVOTHROID) 25 MCG tablet Take 1.5 tablets (37.5 mcg) by mouth daily     levothyroxine (SYNTHROID/LEVOTHROID) 50 MCG tablet Take 1 tablet (50 mcg) by mouth daily     loratadine (CLARITIN) 10 MG tablet Take 1 tablet (10 mg) by mouth daily      Review of Systems:   A comprehensive 10 point review of systems (constitutional, ENT, cardiac, peripheral vascular, lymphatic, respiratory, GI, , Musculoskeletal, skin, Neurological) was performed and found to be negative except as described in this note.      Physical Exam:   Vitals: /70   Pulse 84   Resp 16   Ht 1.6 m (5' 3\")   Wt 56.2 kg (124 lb)   SpO2 100%   BMI 21.97 kg/m     General: Seated comfortably in no acute distress.  HEENT: Neck supple with normal range of motion. No paracervical muscle tenderness or tightness.  Optic discs sharp and vasculature normal on funduscopic exam. "   Extremities: no edema, no rash on feet noted, good blanchable.  Some neck pain with rotation.  Skin: No rashes  Neurologic:     Mental Status: Fully alert, attentive and oriented. Speech clear and fluent, no paraphasic errors. MINI-CO/5.  Difficulty with cube copy (failed multiple attempts).  Sentence repetition without error. Names 12 words that start with F without repeats or difficulties (slower kim and production in total though).       Cranial Nerves: Visual fields intact in all quadrants to numbers on fingers and movement. PERRL (no delay). EOMI with normal smooth pursuit (no nystagmus, no SALENA). Facial sensation intact/symmetric. Facial movements symmetric. Hearing not formally tested but intact to conversation. Palate elevation symmetric, uvula midline. No dysarthria. Shoulder shrug strong bilaterally. Tongue protrusion midline.     Motor: No tremors or other abnormal movements observed. Muscle tone normal throughout. No pronator drift. Normal/symmetric rapid finger tapping. Strength 5/5 throughout upper and lower extremities.     Deep Tendon Reflexes: 2+/symmetric throughout upper and lower extremities (brisk but not necessarily hyper-reflexic). No clonus. Toes downgoing bilaterally.     Sensory: Intact/symmetric to light touch, pinprick, temperature, vibration and proprioception throughout upper and lower extremities. Negative Romberg.      Coordination: Finger-nose-finger and heel-shin intact without dysmetria. Rapid alternating movements intact/symmetric with normal speed and rhythm.     Gait: Normal, steady casual gait. Able to walk on toes, heels and tandem without difficulty.         Data: Pertinent prior to visit   Imaging:  Head CT: 2016  Impression:  No abnormal intracranial contrast enhancing lesions noted    Laboratory:  2021 - 2021  B12, Adrenal corticotropin, cortisol, cosyntropin stimulation test - normal    10/6/2020  TPO - 259  Normal - T4, T3, , PTH, TSH,  Anti-thyroglobulin    4/29/2019  Normal - Vitamin D, CMP, ferritin, Iron and iron binding, B12    2016:  Lyme disease amalia - normal           Assessment and Plan:   Assessment:  Subjective cognitive deficits  Left occipital neuralgia  Hx of migraine and tension headache    The patient describes over 6 years of cognitive deficits which have progressed slightly but not to the point of leading to functional decline, as well as what seems to be occipital neuralgia.  Her cognitive screening today was without major deficit, but did show some impairment in visual-spatial tasks as well as some slowed word production.  Her clinical picture is not consistent with encephaloapthy or encphalitis caused by autoimmune reactions or paraneoplastic give the lack of fluctuations, prolonged duration without other focal deficits.  However, given the history of childhood migraine, eye pain that resolves w/in a few weeks, and diffuse cognitive concerns, MRI brain would be reasonable to rule out conditions like MS, encephalomalacia, or white matter diseases.      Her occipital neuralgia on the left is without a history of trauma, and is lacking other focal deficits concerning for vertebral dissection/radicular process/meylopathy.  I would have the patient undergo a lidocaine block of the occipital nerves (greater and lesser) on the left side through physiatry if thought reasonable through their service.  At the same time, to rule out certain autoimmune disorders, MRI cervical spine should be done.     Plan:  - MRI brain and cervical spine w/wout contrast  - Physiatrist for lidocaine nerve block for occipital neuralgia on the left  - OT for visual spatial and concentration evaluation of cognition    Pending the above tests, the patient may need expanded w/up including serum tests for encephalopathy/other etiologies of migraine/cognitive deficits.    Follow up in Neurology clinic in 4 weeks or earlier as needed should new concerns  brandon Still D.O.   of Neurology    Total time today (60 min) in this patient encounter was spent on pre-charting, counseling and/or coordination of care. We reviewed diagnostic results, impressions, and discussed other possible tests if symptoms do not improve. We discussed the implications of the diagnosis, as well as risks and benefits of management options. We reviewed treatment instructions and our scheduled follow-up as specified in the discharge plan. We also discussed the importance of compliance with the chosen course of treatment. The patient is in agreement with this plan and has no further questions.

## 2021-06-04 NOTE — NURSING NOTE
Chief Complaint   Patient presents with     Consult     [facility] New - Memory changes     Tejinder Navarro

## 2021-06-04 NOTE — LETTER
6/4/2021       RE: Kelley Serra  [address] [facility] [address]     Dear Colleague,    Thank you for referring your patient, Kelley Serra, to the [facility] NEUROLOGY CLINIC [place] at [facility]. Please see a copy of my visit note below.    [facility] Neurology Consultation    Kelley Serra MRN# [id number]   Age: 30 year old YOB: 1990     Requesting physician: Tamica Durán  [facility]     Reason for Consultation: memory changes      History of Presenting Symptoms:   Kelley Serra is a 30 year old female who presents today for evaluation of memory changes.  The patient has a pertinent medical history of Hashimotos's thyroiditis (+TPO, on levothyroxine), stage 1A ovarian cancer s/p R-oophorectomy,  And generalized anxiety.  During visits with endocrinology, the patient reported a 5 year history of cognitive/functional dysfunction, dry skin, and fatigue.  Prior treatment with prozac for 2 years wasn't helpful for depression, she is vegan and takes b12 supplements, and she sleeps 8 hours per night.    Today, the patient reports weird symptoms since 2014.  She has described difficulty with focus relating to information retention, saying the wrong words at times in conversation.  The patient's mother was diagnosed with cancer in 2014, which did cause some stress in the patient's life, but otherwise no major other events occurred at that time.       The patient has had headaches and migraines in her past.  She describes her last migraines occurring in 2016, and described waking up with extreme pain (not being able to see out of one eye (can't recall which eye)). The pain was throughout her whole head.  They would last a few hours, and go away with sleep and Excedrin.  She had associated nausea, tiredness, and felt flu-kelley.  These started in 2014 and went through 2016, and didn't correlate with menses.       Her headache history is longer than her migraine history.  She had a great deal of headaches as a child (5y/o).  She went through multiple w/ups through outside facilities and reports that she had imaging (I don't see any imaging of the head earlier than 2016).  Previously, headaches were described as head pressure on the bi-temporal and frontal regions.  They lacked nausea, dizziness, photophobia, phonophobia, and were described as throbbing pain lasting a few hours.  The headaches weren't triggered by body position, light, time of day, stress, foods, lack of sleep.  The headaches changed from this presentation, to left sided headaches one year ago.  She has had headaches on the back left occipital portion of her her head.  These headaches are happening twice a week.  She doesn't describe any neck injury, MVA, or striking of her head.  There are no other associated symptoms with these headaches.  They are still described as throbbing pain that is relieved somewhat with acetaminophen.    The patient doesn't describe any seizure history as a child, had a normal development as a child, no history of meningitis or encephalitis, and has no family history of seizure disorders.  She does report increased fatigue and being tired easily with basic exercise.  She doesn't have any numbness/tingling/weakness in her legs/arms.  She has no deficits with swallowing.  She has no diplopia.        Past Medical History:     Past Medical History:   Diagnosis Date     Allergic rhinitis due to animal dander      Allergy to mold spores     2/26/14 skin tests pos. for:  dog(+)/DM/M only and only on intradermals--ie, minimally allergic     Asthma, mild intermittent     last inhaler usage >6 years ago (4/11)     Cancer (H) 9/2015    Stage 1A ovarian cancer     Diagnostic skin and sensitization tests 2/26/14 skin tests pos. 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    treated with RSO      Past Surgical History:     Past Surgical History:   Procedure Laterality Date     APPENDECTOMY  10/2/2015     BIOPSY  10/2/2015     COLONOSCOPY N/A 12/2/2015    Procedure: COLONOSCOPY;  Surgeon: Aaron Garrison MD;  Location:  GI     ESOPHAGOSCOPY, GASTROSCOPY, DUODENOSCOPY (EGD), COMBINED N/A 11/23/2015    Procedure: COMBINED ESOPHAGOSCOPY, GASTROSCOPY, DUODENOSCOPY (EGD), BIOPSY SINGLE OR MULTIPLE;  Surgeon: Babs Cassidy MD;  Location:  GI     GYN SURGERY  10/2/2015     LAPAROTOMY EXPLORATORY  10/2/15    Right salpingo-oophorectomy, omentectomy, appendectomy, lymph node biopsies, cancer staging      LAPAROTOMY, STAGING, COMBINED N/A 10/2/2015    Procedure: COMBINED LAPAROTOMY, STAGING;  Surgeon: Duyen Thayer MD;  Location:  OR      Social History:     Tobacco Use     Smoking status: Never Smoker     Smokeless tobacco: Never Used   Substance Use Topics     Alcohol use: Yes     Comment: Socially     Drug use: No      Medications:     Current Outpatient Medications   Medication Sig     Cholecalciferol (VITAMIN D-3 PO)      fluocinonide (LIDEX) 0.05 % external cream Apply a thin layer to affected area BID x 2 weeks, tapering with improvement. Do not apply to face or body folds. (Patient not taking: Reported on 10/29/2020)     levothyroxine (SYNTHROID/LEVOTHROID) 25 MCG tablet Take 1.5 tablets (37.5 mcg) by mouth daily     levothyroxine (SYNTHROID/LEVOTHROID) 50 MCG tablet Take 1 tablet (50 mcg) by mouth daily     loratadine (CLARITIN) 10 MG tablet Take 1 tablet (10 mg) by mouth daily      Review of Systems:   A comprehensive 10 point review of systems (constitutional, ENT, cardiac, peripheral vascular, lymphatic, respiratory, GI, , Musculoskeletal, skin, Neurological) was performed and found to be negative except as described in this note.      Physical Exam:   Vitals: /70   " Pulse 84   Resp 16   Ht 1.6 m (5' 3\")   Wt 56.2 kg (124 lb)   SpO2 100%   BMI 21.97 kg/m     General: Seated comfortably in no acute distress.  HEENT: Neck supple with normal range of motion. No paracervical muscle tenderness or tightness.  Optic discs sharp and vasculature normal on funduscopic exam.   Extremities: no edema, no rash on feet noted, good blanchable.  Some neck pain with rotation.  Skin: No rashes  Neurologic:     Mental Status: Fully alert, attentive and oriented. Speech clear and fluent, no paraphasic errors. MINI-CO/5.  Difficulty with cube copy (failed multiple attempts).  Sentence repetition without error. Names 12 words that start with F without repeats or difficulties (slower kim and production in total though).       Cranial Nerves: Visual fields intact in all quadrants to numbers on fingers and movement. PERRL (no delay). EOMI with normal smooth pursuit (no nystagmus, no SALENA). Facial sensation intact/symmetric. Facial movements symmetric. Hearing not formally tested but intact to conversation. Palate elevation symmetric, uvula midline. No dysarthria. Shoulder shrug strong bilaterally. Tongue protrusion midline.     Motor: No tremors or other abnormal movements observed. Muscle tone normal throughout. No pronator drift. Normal/symmetric rapid finger tapping. Strength 5/5 throughout upper and lower extremities.     Deep Tendon Reflexes: 2+/symmetric throughout upper and lower extremities (brisk but not necessarily hyper-reflexic). No clonus. Toes downgoing bilaterally.     Sensory: Intact/symmetric to light touch, pinprick, temperature, vibration and proprioception throughout upper and lower extremities. Negative Romberg.      Coordination: Finger-nose-finger and heel-shin intact without dysmetria. Rapid alternating movements intact/symmetric with normal speed and rhythm.     Gait: Normal, steady casual gait. Able to walk on toes, heels and tandem without difficulty.         " Data: Pertinent prior to visit   Imaging:  Head CT: 2016  Impression:  No abnormal intracranial contrast enhancing lesions noted    Laboratory:  2/4/2021 - 2/19/2021  B12, Adrenal corticotropin, cortisol, cosyntropin stimulation test - normal    10/6/2020  TPO - 259  Normal - T4, T3, , PTH, TSH, Anti-thyroglobulin    4/29/2019  Normal - Vitamin D, CMP, ferritin, Iron and iron binding, B12    2016:  Lyme disease amalia - normal           Assessment and Plan:   Assessment:  Subjective cognitive deficits  Left occipital neuralgia  Hx of migraine and tension headache    The patient describes over 6 years of cognitive deficits which have progressed slightly but not to the point of leading to functional decline, as well as what seems to be occipital neuralgia.  Her cognitive screening today was without major deficit, but did show some impairment in visual-spatial tasks as well as some slowed word production.  Her clinical picture is not consistent with encephaloapthy or encphalitis caused by autoimmune reactions or paraneoplastic give the lack of fluctuations, prolonged duration without other focal deficits.  However, given the history of childhood migraine, eye pain that resolves w/in a few weeks, and diffuse cognitive concerns, MRI brain would be reasonable to rule out conditions like MS, encephalomalacia, or white matter diseases.      Her occipital neuralgia on the left is without a history of trauma, and is lacking other focal deficits concerning for vertebral dissection/radicular process/meylopathy.  I would have the patient undergo a lidocaine block of the occipital nerves (greater and lesser) on the left side through physiatry if thought reasonable through their service.  At the same time, to rule out certain autoimmune disorders, MRI cervical spine should be done.     Plan:  - MRI brain and cervical spine w/wout contrast  - Physiatrist for lidocaine nerve block for occipital neuralgia on the left  - OT for  visual spatial and concentration evaluation of cognition    Pending the above tests, the patient may need expanded w/up including serum tests for encephalopathy/other etiologies of migraine/cognitive deficits.    Follow up in Neurology clinic in 4 weeks or earlier as needed should new concerns arise.    JEAN CARLOS Still D.O.   of Neurology    Total time today (60 min) in this patient encounter was spent on pre-charting, counseling and/or coordination of care. We reviewed diagnostic results, impressions, and discussed other possible tests if symptoms do not improve. We discussed the implications of the diagnosis, as well as risks and benefits of management options. We reviewed treatment instructions and our scheduled follow-up as specified in the discharge plan. We also discussed the importance of compliance with the chosen course of treatment. The patient is in agreement with this plan and has no further questions.

## 2021-06-08 ENCOUNTER — ANCILLARY PROCEDURE (OUTPATIENT)
Dept: MRI IMAGING | Facility: CLINIC | Age: 31
End: 2021-06-08
Attending: PSYCHIATRY & NEUROLOGY
Payer: COMMERCIAL

## 2021-06-08 DIAGNOSIS — M54.81 OCCIPITAL NEURALGIA OF LEFT SIDE: ICD-10-CM

## 2021-06-08 DIAGNOSIS — R41.89 SUBJECTIVE COGNITIVE IMPAIRMENT: ICD-10-CM

## 2021-06-08 PROCEDURE — 70551 MRI BRAIN STEM W/O DYE: CPT | Mod: TC | Performed by: RADIOLOGY

## 2021-06-08 PROCEDURE — 72141 MRI NECK SPINE W/O DYE: CPT | Mod: TC | Performed by: RADIOLOGY

## 2021-06-16 NOTE — PROGRESS NOTES
Oscar is a 30 year old who is being evaluated via a billable video visit.      How would you like to obtain your AVS? Lucian  If the video visit is dropped, the invitation should be resent by: Lucian or else Text to cell phone: [phone]  Will anyone else be joining your video visit? No      Video Start Time: 1:40 pm     Assessment & Plan     Episodes of decreased attentiveness  We discussed the need for a neuropsychological evaluation to rule out ADHD or ADD , as it is hard to tell if inattentiveness is related to this vs anxiety or depression, I have given her the referral .  Aslo, she will be having an OT eval for the cognitive status   - MENTAL HEALTH REFERRAL  - Adult; Assessments and Testing; ADHD; [facility] [place]: [facility] [phone]; We will contact you to schedule the appointment or please call with any questions    JOHN PAUL (generalized anxiety disorder)  As above , states that currently it is controlled   Her last thyroid tests have been normal   Brain fog  Has done the neurology eval and work up and that was all normal , will do neuropsychological eval for possible ADHD or ADD , referral given today .      Follow up after the evaluation     Ariadne Jimenez MD  [facility] [place]    Subjective   Oscar is a 30 year old who presents for the following health issues     HPI     Pt here to discuss recent neurology visits regarding fatigue/brain fog/difficulty concentrating, sx's worsening since 2014.   Saw neurology and had a work up which was normal including brain MRI, she is also set up to see OT as part of the cognitive evaluation   Has not had formal evaluation before - only neurology appt.   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      Objective           Vitals:  No vitals were obtained today due to virtual visit.    Physical Exam   GENERAL: Healthy, alert and no distress  EYES: Eyes grossly normal to inspection.  No discharge or erythema, or obvious scleral/conjunctival abnormalities.  RESP: No audible wheeze, cough, or visible cyanosis.  No visible retractions or increased work of breathing.    SKIN: Visible skin clear. No significant rash, abnormal pigmentation or lesions.  NEURO: Cranial nerves grossly intact.  Mentation and speech appropriate for age.  PSYCH: Mentation appears normal, affect normal/bright, judgement and insight intact, normal speech and appearance well-groomed.    No results found for this or any previous visit (from the past 24 hour(s)).            Video-Visit Details    Type of service:  Video Visit    Video End Time:1:54 PM    Originating Location (pt. Location): Home    Distant Location (provider location):  [facility]     Platform used for Video Visit: Prosetta

## 2021-06-18 ENCOUNTER — VIRTUAL VISIT (OUTPATIENT)
Dept: ENDOCRINOLOGY | Facility: CLINIC | Age: 31
End: 2021-06-18
Payer: COMMERCIAL

## 2021-06-18 ENCOUNTER — VIRTUAL VISIT (OUTPATIENT)
Dept: FAMILY MEDICINE | Facility: CLINIC | Age: 31
End: 2021-06-18
Payer: COMMERCIAL

## 2021-06-18 DIAGNOSIS — E06.3 HASHIMOTO'S THYROIDITIS: Primary | ICD-10-CM

## 2021-06-18 DIAGNOSIS — R41.89 BRAIN FOG: ICD-10-CM

## 2021-06-18 DIAGNOSIS — R53.83 OTHER FATIGUE: ICD-10-CM

## 2021-06-18 DIAGNOSIS — R68.89 EPISODES OF DECREASED ATTENTIVENESS: Primary | ICD-10-CM

## 2021-06-18 DIAGNOSIS — F43.9 STRESS: ICD-10-CM

## 2021-06-18 DIAGNOSIS — F41.1 GAD (GENERALIZED ANXIETY DISORDER): ICD-10-CM

## 2021-06-18 PROCEDURE — 99214 OFFICE O/P EST MOD 30 MIN: CPT | Mod: 95 | Performed by: FAMILY MEDICINE

## 2021-06-18 PROCEDURE — 99214 OFFICE O/P EST MOD 30 MIN: CPT | Mod: 95 | Performed by: INTERNAL MEDICINE

## 2021-06-18 ASSESSMENT — ANXIETY QUESTIONNAIRES
5. BEING SO RESTLESS THAT IT IS HARD TO SIT STILL: NOT AT ALL
2. NOT BEING ABLE TO STOP OR CONTROL WORRYING: NOT AT ALL
GAD7 TOTAL SCORE: 1
1. FEELING NERVOUS, ANXIOUS, OR ON EDGE: SEVERAL DAYS
3. WORRYING TOO MUCH ABOUT DIFFERENT THINGS: NOT AT ALL
6. BECOMING EASILY ANNOYED OR IRRITABLE: NOT AT ALL
IF YOU CHECKED OFF ANY PROBLEMS ON THIS QUESTIONNAIRE, HOW DIFFICULT HAVE THESE PROBLEMS MADE IT FOR YOU TO DO YOUR WORK, TAKE CARE OF THINGS AT HOME, OR GET ALONG WITH OTHER PEOPLE: SOMEWHAT DIFFICULT
7. FEELING AFRAID AS IF SOMETHING AWFUL MIGHT HAPPEN: NOT AT ALL

## 2021-06-18 ASSESSMENT — PATIENT HEALTH QUESTIONNAIRE - PHQ9
SUM OF ALL RESPONSES TO PHQ QUESTIONS 1-9: 4
5. POOR APPETITE OR OVEREATING: NOT AT ALL

## 2021-06-18 NOTE — LETTER
"    6/18/2021         RE: Kelley Serra  [address]  [facility] [address]        Dear Colleague,    Thank you for referring your patient, Kelley Serra, to the [facility]. Please see a copy of my visit note below.    Oscar is a 30 year old who is being evaluated via a billable telephone visit.      What phone number would you like to be contacted at? [phone]  How would you like to obtain your AVS? Lucian    S:   Patient presents for evaluation of abnormal thyroid function tests.   She asked to have labs drawn due to concerns about \"brain fog\", dry skin, and fatigue over the last five years.   Over the last 15 months, she has noticed a \"tightness\" in her throat.     No recent changes in weight.   Sleeps 8 hours a night. She does not toss or turn.     Occasional palpitations when she is lying down.   No SOB.     Menses are regular. Notes they seeming to last longer recently.   No children. No immediate plans for children.   No dysphagia or changes in speech.     She has a history of stage 1 ovarian cancer.   She has a right oophorectomy.     In 12/2020, she is on 25 mcg of levothyroxine daily.   Notes her eyelashes are not falling out as much since starting medication.   No significant change in fatigue.     In 2/2021, taking 37.5 mcg of levothyroxine daily.   No change in fatigue.   She sleeps 8 hours a night. Not refreshed in the AM.   No snoring. No tossing and turning.   No change in weight.   Prior issues with IBS resolved with stopping dairy. Recently more GI upset.   She is a vegan. Takes B12 tablets.   No black or bloody stools.  Admits to feelings of depression but feels this is related to being upset about her health problems.   She was on prozac for ~ 2 years and did not feel it had an effect. She was not in therapy.     In 6/2021, fatigue unchanged. She was seen by Neurology recently and diagnosed with occipital neuralgia. Recommended for a lidocaine block but " "has not employed yet. She does not feel her HA's correlates with the fatigue. Taking 50 mcg of levothyroxine.     Her IBS seems to be better. Seeing GI later this summer to discuss possible diagnosis of SIBO.     On review, fatigue began in 2014 after her mother was diagnosed with cancer. She still worries \"a lot\" about her mother.     ROS: 10 point ROS neg other than the symptoms noted above in the HPI.    Exam:   Gen: In NAD.     A/P:   Abnormal thyroid function tests - Positive TPO antibody. Explained this means she will develop hypothyroidism in the next 10 years. Extensive discussion of thyroid hormone and normal physiology. Included was discussion of thyroid in relation to weight and energy. She is not actively pursuing pregnancy. As per her neck discomfort, this could be due to thyroid enlargement from Hashimoto's thyroiditis or a nodule.     US in 10/2020 showed small lymph nodes and possibly a parathyroid adenoma.   Her calcium and iPTH were normal.    TSH improved to 2.00 with 25 mcg of levothyroxine. Hair has improved.     US in 1/2021 was normal.     TSH 1.67 on 37.5 mcg of levothyroxine every day. No change in fatigue. We spoke about alternative diagnoses: vitamin D deficiency, iron deficiency, B12 deficiency, AI. Recent normal CBC.   In 6/2021, TSH remains stable. Remains fatigued.   -No change to levothyroxine dose.   -Labs in 3 months to follow for stability.     Fatigue - Normal cosyntropin stimulation test, vitamin D, B12, CMP, and iron in 2/2021. Discussed ongoing stress issues.   -Patient agreed to counseling referral.     Due to the COVID 19 pandemic this visit was a telephone/video visit in order to help prevent spread of infection in this high risk patient and the general population. The patient gave verbal consent for the visit today.    I have independently reviewed and interpreted labs, imaging as indicated.     Chart review/prep time 1  0740  Chart review/prep time 2 0745  Visit Start time " 0829  Visit Stop time 0897  21 minutes spent on the date of the encounter doing chart review, history and exam, documentation and further activities as noted above.   If this were a face to face visit, it would be billed as [id number].    Devan Mcpherson MD on 6/18/2021 at 8:45 AM            Again, thank you for allowing me to participate in the care of your patient.        Sincerely,        Devan Mcpherson MD

## 2021-06-18 NOTE — PROGRESS NOTES
"Oscar is a 30 year old who is being evaluated via a billable telephone visit.      What phone number would you like to be contacted at? [phone]  How would you like to obtain your AVS? Lucian JEFFREY:   Patient presents for evaluation of abnormal thyroid function tests.   She asked to have labs drawn due to concerns about \"brain fog\", dry skin, and fatigue over the last five years.   Over the last 15 months, she has noticed a \"tightness\" in her throat.     No recent changes in weight.   Sleeps 8 hours a night. She does not toss or turn.     Occasional palpitations when she is lying down.   No SOB.     Menses are regular. Notes they seeming to last longer recently.   No children. No immediate plans for children.   No dysphagia or changes in speech.     She has a history of stage 1 ovarian cancer.   She has a right oophorectomy.     In 12/2020, she is on 25 mcg of levothyroxine daily.   Notes her eyelashes are not falling out as much since starting medication.   No significant change in fatigue.     In 2/2021, taking 37.5 mcg of levothyroxine daily.   No change in fatigue.   She sleeps 8 hours a night. Not refreshed in the AM.   No snoring. No tossing and turning.   No change in weight.   Prior issues with IBS resolved with stopping dairy. Recently more GI upset.   She is a vegan. Takes B12 tablets.   No black or bloody stools.  Admits to feelings of depression but feels this is related to being upset about her health problems.   She was on prozac for ~ 2 years and did not feel it had an effect. She was not in therapy.     In 6/2021, fatigue unchanged. She was seen by Neurology recently and diagnosed with occipital neuralgia. Recommended for a lidocaine block but has not employed yet. She does not feel her HA's correlates with the fatigue. Taking 50 mcg of levothyroxine.     Her IBS seems to be better. Seeing GI later this summer to discuss possible diagnosis of SIBO.     On review, fatigue began in 2014 after her " "mother was diagnosed with cancer. She still worries \"a lot\" about her mother.     ROS: 10 point ROS neg other than the symptoms noted above in the HPI.    Exam:   Gen: In NAD.     A/P:   Abnormal thyroid function tests - Positive TPO antibody. Explained this means she will develop hypothyroidism in the next 10 years. Extensive discussion of thyroid hormone and normal physiology. Included was discussion of thyroid in relation to weight and energy. She is not actively pursuing pregnancy. As per her neck discomfort, this could be due to thyroid enlargement from Hashimoto's thyroiditis or a nodule.     US in 10/2020 showed small lymph nodes and possibly a parathyroid adenoma.   Her calcium and iPTH were normal.    TSH improved to 2.00 with 25 mcg of levothyroxine. Hair has improved.     US in 1/2021 was normal.     TSH 1.67 on 37.5 mcg of levothyroxine every day. No change in fatigue. We spoke about alternative diagnoses: vitamin D deficiency, iron deficiency, B12 deficiency, AI. Recent normal CBC.   In 6/2021, TSH remains stable. Remains fatigued.   -No change to levothyroxine dose.   -Labs in 3 months to follow for stability.     Fatigue - Normal cosyntropin stimulation test, vitamin D, B12, CMP, and iron in 2/2021. Discussed ongoing stress issues.   -Patient agreed to counseling referral.     Due to the COVID 19 pandemic this visit was a telephone/video visit in order to help prevent spread of infection in this high risk patient and the general population. The patient gave verbal consent for the visit today.    I have independently reviewed and interpreted labs, imaging as indicated.     Chart review/prep time 1  0740  Chart review/prep time 2 0745  Visit Start time 0829  Visit Stop time 0845  21 minutes spent on the date of the encounter doing chart review, history and exam, documentation and further activities as noted above.   If this were a face to face visit, it would be billed as [id number].    Devan Chase " MD [place] on 6/18/2021 at 8:45 AM

## 2021-06-19 ASSESSMENT — ANXIETY QUESTIONNAIRES: GAD7 TOTAL SCORE: 1

## 2021-06-23 ENCOUNTER — VIRTUAL VISIT (OUTPATIENT)
Dept: PSYCHOLOGY | Facility: CLINIC | Age: 31
End: 2021-06-23
Attending: FAMILY MEDICINE
Payer: COMMERCIAL

## 2021-06-23 DIAGNOSIS — F43.22 ADJUSTMENT DISORDER WITH ANXIETY: Primary | ICD-10-CM

## 2021-06-23 PROCEDURE — 90791 PSYCH DIAGNOSTIC EVALUATION: CPT | Mod: 95 | Performed by: PSYCHOLOGIST

## 2021-06-23 ASSESSMENT — ANXIETY QUESTIONNAIRES
5. BEING SO RESTLESS THAT IT IS HARD TO SIT STILL: NOT AT ALL
3. WORRYING TOO MUCH ABOUT DIFFERENT THINGS: MORE THAN HALF THE DAYS
2. NOT BEING ABLE TO STOP OR CONTROL WORRYING: SEVERAL DAYS
GAD7 TOTAL SCORE: 6
GAD7 TOTAL SCORE: 6
7. FEELING AFRAID AS IF SOMETHING AWFUL MIGHT HAPPEN: NOT AT ALL
6. BECOMING EASILY ANNOYED OR IRRITABLE: SEVERAL DAYS
4. TROUBLE RELAXING: NOT AT ALL
GAD7 TOTAL SCORE: 6
1. FEELING NERVOUS, ANXIOUS, OR ON EDGE: MORE THAN HALF THE DAYS
7. FEELING AFRAID AS IF SOMETHING AWFUL MIGHT HAPPEN: NOT AT ALL

## 2021-06-23 ASSESSMENT — COLUMBIA-SUICIDE SEVERITY RATING SCALE - C-SSRS
1. IN THE PAST MONTH, HAVE YOU WISHED YOU WERE DEAD OR WISHED YOU COULD GO TO SLEEP AND NOT WAKE UP?: NO
1. IN THE PAST MONTH, HAVE YOU WISHED YOU WERE DEAD OR WISHED YOU COULD GO TO SLEEP AND NOT WAKE UP?: NO
2. HAVE YOU ACTUALLY HAD ANY THOUGHTS OF KILLING YOURSELF LIFETIME?: NO
2. HAVE YOU ACTUALLY HAD ANY THOUGHTS OF KILLING YOURSELF?: NO

## 2021-06-23 ASSESSMENT — PATIENT HEALTH QUESTIONNAIRE - PHQ9
SUM OF ALL RESPONSES TO PHQ QUESTIONS 1-9: 7
SUM OF ALL RESPONSES TO PHQ QUESTIONS 1-9: 7
10. IF YOU CHECKED OFF ANY PROBLEMS, HOW DIFFICULT HAVE THESE PROBLEMS MADE IT FOR YOU TO DO YOUR WORK, TAKE CARE OF THINGS AT HOME, OR GET ALONG WITH OTHER PEOPLE: SOMEWHAT DIFFICULT

## 2021-06-23 NOTE — PROGRESS NOTES
[facility]  Provider Name:  Morenita Maya     Credentials:  PhD, LP    PATIENT'S NAME: Kelley Serra  PREFERRED NAME: Oscar  PRONOUNS: She/Her  MRN: [id number]  : 1990  ADDRESS: [address] [place] [id number]  [facility]  [id number]  DATE OF SERVICE: 21  START TIME: 1:00  END TIME: 1:30  PREFERRED PHONE: [phone]  May we leave a program related message: Yes  SERVICE MODALITY:  Video Visit:      Provider verified identity through the following two step process.  Patient provided:  Patient     Telemedicine Visit: The patient's condition can be safely assessed and treated via synchronous audio and visual telemedicine encounter.      Reason for Telemedicine Visit: Services only offered telehealth    Originating Site (Patient Location): Patient's home    Distant Site (Provider Location): Provider Home Office    Consent:  The patient/guardian has verbally consented to: the potential risks and benefits of telemedicine (video visit) versus in person care; bill my insurance or make self-payment for services provided; and responsibility for payment of non-covered services.     Patient would like the video invitation sent by:  My Chart    Mode of Communication:  Video Conference via Amwell    As the provider I attest to compliance with applicable laws and regulations related to telemedicine.    UNIVERSAL ADULT Mental Health DIAGNOSTIC ASSESSMENT    Identifying Information:  Patient is a 30 year old,   female.  The pronoun use throughout this assessment reflects the patient's chosen pronoun.  Patient was referred for an assessment by primary care provider .  Patient attended the session alone.     Chief Complaint:   The purpose of this evaluation is to: provide treatment recommendations and clarify diagnosis. Patient reported seeking services at this time for diagnostic assessment and recommendations for treatment.  Patient reported that  she has not  "completed a previous ADHD diagnostic assessment.  Patient has not received a previous diagnosis of ADHD. Patient reported that medication has not been prescribed medication to address these problems. Client is having a hard time focusing. This has gotten worse since 2014 (when her mother was diagnosed with cancer). She has \"brain fog\" and forgets words \"at the last second.\" It is hard to understand new information. She can't concentrate and she \"tunes people out\" when they are talking to her. She has to try really hard to focus. She has had a lot of medical testing to rule ou physical causes; she had an MRI last week to rule out issues. A neurologist recently determined her attention was poor and recommended testing for ADHD. It takes her longer to get things done now (things take twice as long to get done than it did in 2014). She used to be efficient and could multitask and now she is unable to do that and everything feels effortful.      Social/Family History:  Patient reported they grew up in [place], [place].  They were raised by biological parents.  Parents  when she was 14. They lived with her mother most of the time and saw her father once per week and every other weekend. She was the first born of two children. She has a younger brother and a half-sister (who is 11 years older). Her half-sister struggles with mental health issues, so they are not close. Patient reported that their childhood was \"really good.\" They spent a lot of time together and she has fond memories.  Her father is an alcoholic. He didn't drink at home but he was always gone with his friends. They spent a lot of time with their mother who would feel angry when her father wasn't home. Client knows there was infidelity (\"I knew my dad wasn't faithful to my mom and I just always knew it and could sense it and that caused anxiety as a teen as well\"). Once she got older, her mother told Client and her brother. Client might have overheard " them fighting or arguing at times. Patient described their current relationships with family of origin as close with parents and her brother..      The patient describes their cultural background as American.  Cultural influences and impact on patient's life structure, values, norms, and healthcare: Racial or Ethnic Self-Identification identifies as white.  Contextual influences on patient's health include: Individual Factors history of cancer. These factors will be addressed in the Preliminary Treatment plan.  Patient identified their preferred language to be English. Patient reported they does not need the assistance of an  or other support involved in therapy.     Patient reported had no significant delays in developmental tasks.   Patient's highest education level was college graduate. Patient identified the following learning problems: none reported.  Modifications will not be used to assist communication in therapy.  Patient reports they are  able to understand written materials.    Patient reported the following relationship history: one marriage.  Patient's current relationship status is  for 10 years.   Patient identified their sexual orientation as heterosexual.  Patient reported having zero children. Patient identified mother, father, pets, friends and spouse and brother as part of their support system.  Patient identified the quality of these relationships as stable and meaningful.      Patient's current living/housing situation involves staying in own home/apartment.  They live with her  and their bunnies and they report that housing is stable.     Patient is currently employed part time and reports they are able to function appropriately at work..  She was working full-time as a  and cut back to part time due to attention issues. It has been hard for her to focus. 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 She noted that the topics she works on vary and it depends on the day. Patient reports their finances are obtained through employment and spouse.  Patient does not identify finances as a current stressor.      Patient reported that they have not been involved with the legal system.   Patient denies being on probation / parole / under the jurisdiction of the court.      Personal and Family Medical History:   Patient does report a family history of mental health concerns.  Patient reports family history includes Blood Disease in her maternal grandmother and mother; Breast Cancer in her paternal grandmother; Cancer in her maternal grandfather and maternal grandmother; Cancer - colorectal (age of onset: 54) in her mother; Cerebrovascular Disease in her maternal grandfather and paternal grandfather; Colon Cancer in her mother; Coronary Artery Disease in her father; Diabetes in her maternal grandfather, mother, and paternal grandfather; Heart Disease (age of onset: 56) in her father; Respiratory in her maternal grandmother; Skin Cancer in her paternal grandfather; Thyroid Disease in her mother.. Her half sister has anxiety and Borderline Personality Disorder.    Patient does report Mental Health Diagnosis and/or Treatment. Patient reported the following previous diagnoses which include(s): an Anxiety Disorder.  Patient reported symptoms began in childhood (she has alwyas been \"stressed and a worrier\" since [employer] high).  She thinks that her parents divorce at age 14 was difficult. Her mother was diagnosed with cancer in 2014 and Client was diagnosed with cancer in 2015. Patient has received mental health services in the past: medication from PCP and therapy. She was prescribed Fluoxetine for anxiety a few years ago but it wasn't helpful. When her parents  when she was 14 years old she went to therapy for about a year. Client was in individual therapy (one session a few years ago).  Psychiatric " Hospitalizations: None.  Patient denies a history of civil commitment.  Currently, patient is receiving other mental health services. These include none.       Patient has had a physical exam to rule out medical causes for current symptoms.  Date of last physical exam was within the past year. Client was encouraged to follow up with PCP if symptoms were to develop. The patient has a [place] Primary Care Provider, who is named [facility]..  Patient reports the following current medical concerns: asthma - Hashimotos (Auto immune thyroid disease) diagnosed in September, headaches/migraines. She had a history of stage 1 ovarian cancer in 2015 (this brought on a lot of stress and anxiety and attention issues at that time (age 23). Her mother was diagnosed with cancer, then she found out she had cancer one year later. Patient denies any issues with pain.  There are not significant appetite / nutritional concerns / weight changes.  Patient does not report a history of head injury / trauma / cognitive impairment.      Patient reports current meds as:   Outpatient Medications Marked as Taking for the 6/23/21 encounter (Virtual Visit) with Morenita Maya, PhD   Medication Sig     BENFOTIAMINE PO Take 200 mg by mouth     Cholecalciferol (VITAMIN D-3 PO)      fluocinonide (LIDEX) 0.05 % external cream Apply a thin layer to affected area BID x 2 weeks, tapering with improvement. Do not apply to face or body folds.     LACTOBACILLUS PO 50 billion     levothyroxine (SYNTHROID/LEVOTHROID) 50 MCG tablet Take 1 tablet (50 mcg) by mouth daily     loratadine (CLARITIN) 10 MG tablet Take 1 tablet (10 mg) by mouth daily     MULTIPLE VITAMINS PO take  by mouth.     NONFORMULARY 40 billion CFU S. Bouladarii (probiotic)     Selenium 200 MCG TABS tablet Take 200 mcg by mouth daily     Turmeric (QC TUMERIC COMPLEX PO) 2400 mg curcurmin w/ black pepper       Medication Adherence:  Patient reports she is not prescribed  psychiatric medications.    Patient Allergies:    Allergies   Allergen Reactions     Cleocin Rash     Keflex [Cephalexin Monohydrate] Rash     Zoloft        Medical History:    Past Medical History:   Diagnosis Date     Allergic rhinitis due to animal dander      Allergy to mold spores     2/26/14 skin tests pos. for:  dog(+)/DM/M only and only on intradermals--ie, minimally allergic     Asthma, mild intermittent     last inhaler usage >6 years ago (4/11)     Cancer (H) 9/2015    Stage 1A ovarian cancer     Diagnostic skin and sensitization tests 2/26/14 skin tests pos. 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    treated with RSO         Current Mental Status Exam:   Appearance:  Appropriate    Eye Contact:  Good   Psychomotor:  Normal       Gait / station:  no problem  Attitude / Demeanor: Cooperative   Speech      Rate / Production: Normal/ Responsive      Volume:  Normal  volume      Language:  no problems and good  Mood:   Normal  Affect:   Appropriate    Thought Content: Clear   Thought Process: Goal Directed  Logical       Associations: No loosening of associations  Insight:   Good   Judgment:  Intact   Orientation:  All  Attention/concentration: Good    Rating Scales:    PHQ9:    PHQ-9 SCORE 4/26/2019 6/18/2021 6/23/2021   PHQ-9 Total Score - - -   PHQ-9 Total Score MyChart - - 7 (Mild depression)   PHQ-9 Total Score 4 4 7   ;    GAD7:    JOHN PAUL-7 SCORE 4/26/2019 6/18/2021 6/23/2021   Total Score - - 6 (mild anxiety)   Total Score 3 1 6     CGI:     First:Considering your total clinical experience with this particular patient population, how severe are the patient's symptoms at this time?: 3 (6/23/2021  1:39 PM)        Substance Use:  Patient did report a family history of substance use concerns; see medical history section for details. Her father is an alcoholic.  Patient has not received chemical dependency treatment in the past.  Patient has not ever been to detox.      Patient is not currently receiving any chemical dependency treatment. Patient reported the following problems as a result of their substance use:  none reported.    Patient reports using alcohol 1 times per week and has 1-2 mixed drinks at a time. Patient first started drinking at age 16.  Patient reported date of last use was last week.  Patient reports heaviest use is current use.  Patient denies using tobacco.  Patient denies using cannabis.  Patient reports using caffeine 1 times per day and drinks 1 at a time. Patient started using caffeine at age [age].  Patient reports using/abusing the following substances. Patient reported no other substance use.     CAGE- AID:    CAGE-AID Total Score 6/23/2021   Total Score 0   Total Score MyChart 0 (A total score of 2 or greater is considered clinically significant)       Substance Use: No symptoms    Based on the negative CAGE score and clinical interview there  are not indications of drug or alcohol abuse.    Significant Losses / Trauma / Abuse / Neglect Issues:   Patient did not serve in the .  There are indications or report of significant loss, trauma, abuse or neglect issues related to:  Her parents  when she was 14 years old; her mother was diagnosed with cancer in 2014; Client was diagnosed with cancer in 2015; hashimoto's diagnosis in September 2020  Concerns for possible neglect are not present.     Safety Assessment:   Current Safety Concerns:  Moody Suicide Severity Rating Scale (Lifetime/Recent)  Moody Suicide Severity Rating (Lifetime/Recent) 6/23/2021   1. Wish to be Dead (Lifetime) No   1. Wish to be Dead (Recent) No   2. Non-Specific Active Suicidal Thoughts (Lifetime) No   2. Non-Specific Active Suicidal Thoughts (Recent) No     Patient denies current homicidal ideation and behaviors.  Patient denies current self-injurious  ideation and behaviors.    Patient denied risk behaviors associated with substance use.  Patient denies any high risk behaviors associated with mental health symptoms.  Patient reports the following current concerns for their personal safety: None.  Patient reports there are not  firearms in the house.        History of Safety Concerns:  Patient denied a history of homicidal ideation.     Patient denied a history of personal safety concerns.    Patient denied a history of assaultive behaviors.    Patient denied a history of sexual assault behaviors.     Patient denied a history of risk behaviors associated with substance use.  Patient denies any history of high risk behaviors associated with mental health symptoms.    Risk Plan:  See Recommendations for Safety and Risk Management Plan    Review of Symptoms per patient report:  Depression: Change in sleep, Excessive or inappropriate guilt, Change in energy level, Difficulties concentrating and Psychomotor slowing or agitation  Laverne:  No Symptoms  Psychosis: No Symptoms  Anxiety: Excessive worry, Nervousness and Irritability  Panic:  Palpitations, Shortness of breath, Hot or cold flashes and sweaty; triggered if she is worried about her health or a loved one's health (when someone is sick); these happen once every couple of months (a few times per year)  Post Traumatic Stress Disorder:  Experienced traumatic event health issues   Eating Disorder: No Symptoms  ADD / ADHD:  Inattentive, Difficulties listening, Poor task completion and Distractibility  Conduct Disorder: No symptoms  Autism Spectrum Disorder: No symptoms  Obsessive Compulsive Disorder: No Symptoms    Patient reports the following compulsive behaviors and treatment history: none reported.      Diagnostic Criteria:   A. The development of emotional or behavioral symptoms in response to an identifiable stressor(s) occurring within 3 months of the onset of the stressor(s)  B. These symptoms or behaviors are  clinically significant, as evidenced by one or both of the following:       - Significant impairment in social, occupational, or other important areas of functioning  C. The stress-related disturbance does not meet criteria for another disorder & is not not an exacerbation of another mental disorder  D. The symptoms do not represent normal bereavement  E. Once the stressor or its consequences have terminated, the symptoms do not persist for more than an additional 6 months       * Adjustment Disorder with Anxiety: The predominant manfestations are symptoms such as nervousness, worry, or jitteriness, or, in children separation anxiety from major attachment figures    Functional Status:  Patient reports the following functional impairments: health maintenance, management of the household and or completion of tasks and work / vocational responsibilities.     WHODAS: No flowsheet data found.      Clinical Summary:  1. Reason for assessment: ADHD Evaluation  .  2. Psychosocial, Cultural and Contextual Factors: health issues; history of cancer; Hashimoto's  .  3. Principal DSM5 Diagnoses  (Sustained by DSM5 Criteria Listed Above):   Adjustment Disorders  309.24 (F43.22) With anxiety.  RULE OUT: ADHD  6. Prognosis: Expect Improvement and Maintain Current Status / Prevent Deterioration.  7. Likely consequences of symptoms if not treated: issues with work.  8. Client strengths include:  educated, employed, goal-focused, good listener, has a previous history of therapy, insightful, intelligent, motivated, support of family, friends and providers, supportive, wants to learn and willing to ask questions .     Recommendations:     1. Plan for Safety and Risk Management:   Recommended that patient call 911 or go to the local ED should there be a change in any of these risk factors..          Report to child / adult protection services was NA.      4. Resources/Service Plan:    services are not  indicated.   Modifications to assist communication are not indicated.   Additional disability accommodations are not indicated.      5. Collaboration:   Collaboration / coordination of treatment will be initiated with the following  support professionals: primary care physician.      6.  Referrals:   The following referral(s) will be initiated: NA. Next Scheduled Appointment: NA.     A Release of Information has been obtained for the following: primary care physician.    7. MOHSEN:    MOHSEN:  Discussed the general effects of drugs and alcohol on health and well-being. Provider gave patient printed information about the effects of chemical use on their health and well being. Recommendations:  NA.     8. Records:   These were reviewed at time of assessment.   Information in this assessment was obtained from the medical record and  provided by patient who is a good historian.    Patient will have open access to their mental health medical record.        Provider Name/ Credentials:  Morenita Maya, PhD, LP  June 23, 2021

## 2021-06-24 ENCOUNTER — DOCUMENTATION ONLY (OUTPATIENT)
Dept: PSYCHOLOGY | Facility: CLINIC | Age: 31
End: 2021-06-24

## 2021-06-24 ASSESSMENT — PATIENT HEALTH QUESTIONNAIRE - PHQ9: SUM OF ALL RESPONSES TO PHQ QUESTIONS 1-9: 7

## 2021-06-24 ASSESSMENT — ANXIETY QUESTIONNAIRES: GAD7 TOTAL SCORE: 6

## 2021-06-24 NOTE — PROGRESS NOTES
Client Name: Oscar Serra   MRN: [id number]  : 1990    Client completed the [place] Multiphasic Personality Inventory-2 (MMPI-2), a self-report personality inventory, as part of her evaluation. Validity scales indicate that the client responded in an open and consistent manner, resulting in a valid profile. The following results are likely to be an accurate reflection of client's current functioning. Client s responses suggest that she is reporting moderate levels of general emotional distress. Individuals with similar profiles tend to generally see themselves as well adjusted and as sufficiently able to cope with the difficulties they face that they feel little or no need to draw attention to them. They are preoccupied with and concerned about their physical functioning or general health status. They have a tendency to develop physical symptoms in response to stress. They likely experience withdrawal, lack of energy, drive, interest, and motivation. They seek to avoid other people, both individuals and groups, because they feel uneasy and awkward in such situations and because they say they are happier being alone. They likely hold excessive generosity about the motives of others. They may experience a sense of mental failure or decline and the depletion of energy needed to accomplish mental work. Thinking and problem-solving are experienced as effortful and as subject to going off course even when significant effort is made. Thinking may be viewed as impaired or unreliable; they may have a sense that  I can t seem to get my mind right.

## 2021-06-29 ENCOUNTER — DOCUMENTATION ONLY (OUTPATIENT)
Dept: PSYCHOLOGY | Facility: CLINIC | Age: 31
End: 2021-06-29

## 2021-06-29 NOTE — PROGRESS NOTES
"Client Name: Oscar Serra   MRN: [id number]  : 1990    Bertha Adult ADHD Rating Scale-IV: Self and Other Reports (BAARS-IV)  The BAARS-IV assesses for symptoms of ADHD that are experienced in one's daily life. This assessment measure includes self and collateral rating scales designed to provide information regarding current and childhood symptoms of ADHD including inattention, hyperactivity, and impulsivity. Self-report scores are reported as percentiles. Scores at the 76th-83rd percentile are considered marginal, scores at the 84th-92nd percentile are considered borderline, scores at the 93rd-95th percentile are considered mild, scores at the 96th-98th percentile are considered moderate, and those at the 99th percentile are considered severe. Collateral or \"other\" rating scales are reported as number of symptoms observed in comparison to those reported by the client. Norms and percentile scores are not available for collateral reports.      Current Symptoms Scale--Self Report:   Client completed the self-report inventory of current symptoms. The results indicate that the client's Total ADHD Score was 32 which places her in the 81st percentile for overall ADHD symptoms. In addition, the client endorsed the following occur \"often\" or \"very often\": 4/9 (95th percentile) Inattention symptoms, 0/9 (1-75th percentile) Hyperactivity/Impulsivity symptoms, and 6/9 (96th percentile) Sluggish Cognitive Tempo symptoms. Client indicated that the reported symptoms have resulted in impaired functioning in work and social relationships. Overall, the results suggest the client is reporting mild symptoms of inattention at this time.      Current Symptoms Scale--Other Report:  Client's  completed the collateral report inventory of current symptoms. Based on the collateral contact's observation of symptoms, the client demonstrates the following \"often\" or \"very often\": 1/9 Inattention symptoms, 0/5 Hyperactivity " "symptoms, 0/4 Impulsivity symptoms, and 4/9 Sluggish Cognitive Tempo symptoms. The client's Total ADHD Score was 22. The collateral contact indicated the client demonstrates impaired functioning in social relationships. The collateral- and self-report scores are discrepant; her  did not note significant symptoms of ADHD at this time.      Childhood Symptoms Scale--Self-Report:  Client completed the self-report inventory of childhood symptoms. The results indicate that the client's Total ADHD Score was 20 which places her in the 1-50th percentile for overall ADHD symptoms in childhood. In addition, the client endorsed having experienced the following \"often\" or \"very often\": 0/9 (1-50th percentile) Inattention symptoms and 0/9 (1-50th percentile) Hyperactivity-Impulsivity symptoms. Overall, the results suggest the Client did not report experiencing symptoms of ADHD in childhood.      Childhood Symptoms Scale--Other Report:  Client's mother completed the collateral report inventory of childhood symptoms. Based on the collateral contact's recollection of client's childhood symptoms, the client demonstrated the following \"often\" or \"very often\": 0/9 Inattention symptoms and 0/9 Hyperactivity-Impulsivity symptoms. The client's Total ADHD Score was 18. The collateral-report and self-report scores are similar and suggest Client did not experience some symptoms of ADHD in childhood.       Bertha Functional Impairment Scale: Self and Other Reports (BFIS)  The BFIS is used to assess an individuals' psychosocial impairment in major life/daily activities that may be due to a mental health disorder. This assessment measure includes self and collateral rating scales. Self-report scores are reported as percentiles. Scores at the 76th-83rd percentile are considered marginal, scores at the 84th-92nd percentile are considered borderline, scores at the 93rd-95th percentile are considered mild, scores at the 96th-98th " "percentile are considered moderate, and those at the 99th percentile are considered severe. Collateral or \"other\" rating scales are reported as number of symptoms observed in comparison to those reported by the client. Norms and percentile scores are not available for collateral reports.      Results indicate the client identified impairment (scores at or greater than 93rd percentile) in the following area: work. The client's Mean Impairment Score was 1.5 (1-50th percentile) indicating the client is not reporting impairment in functioning across domains. Client's  completed the collateral rating scale, which indicated similar results (e.g., Mean Impairment Score of 1.15). Her  identified impairment in the area of: social-strangers.       Bertha Deficits in Executive Functioning Scale (BDEFS)  The BDEFS is a measure used for evaluating dimensions of adult executive functioning in daily life. This assessment measure includes self and collateral rating scales. Self-report scores are reported as percentiles. Scores at the 76th-83rd percentile are considered marginal, scores at the 84th-92nd percentile are considered borderline, scores at the 93rd-95th percentile are considered mild, scores at the 96th-98th percentile are considered moderate, and those at the 99th percentile are considered severe. Collateral or \"other\" rating scales are reported as number of symptoms observed in comparison to those reported by the client. Norms and percentile scores are not available for collateral reports.      Results indicate the client's Total Executive Functioning Score was 161 (51-75th percentile). The ADHD-Executive Functioning Index score was 19 (51-75th percentile). These scores suggest the client does not have deficits in executive functioning. These deficits may be due to ADHD or another mental health disorder. Results indicate the client identified significant deficits in the following area: " self-organization/problem-solving (severe). Client's  completed the collateral rating scale, which indicated similar results. His scores were somewhat lower. He noted a deficit in the area of self-organization/problem-solving.     Generalized Anxiety Disorder Questionnaire (JOHN PAUL-7)  This questionnaire is designed to screen for anxiety in adults. Based on the client's score of 3, she is not reporting significant symptoms of anxiety at this time.      Patient Health Questionnaire- 9 (PHQ-9)   This questionnaire is designed to screen for depression in adults. Based on the client's score of 6, she is reporting mild symptoms of depression at this time. Symptoms endorsed include: little interest or pleasure in doing things; feeling tired or having little energy; poor concentration; and feeling fidgety/restless.

## 2021-06-30 ENCOUNTER — HOSPITAL ENCOUNTER (OUTPATIENT)
Dept: OCCUPATIONAL THERAPY | Facility: CLINIC | Age: 31
Setting detail: THERAPIES SERIES
End: 2021-06-30
Attending: PSYCHIATRY & NEUROLOGY
Payer: COMMERCIAL

## 2021-06-30 ENCOUNTER — VIRTUAL VISIT (OUTPATIENT)
Dept: PSYCHOLOGY | Facility: CLINIC | Age: 31
End: 2021-06-30
Attending: FAMILY MEDICINE
Payer: COMMERCIAL

## 2021-06-30 DIAGNOSIS — F43.22 ADJUSTMENT DISORDER WITH ANXIETY: Primary | ICD-10-CM

## 2021-06-30 DIAGNOSIS — R41.89 SUBJECTIVE COGNITIVE IMPAIRMENT: ICD-10-CM

## 2021-06-30 PROCEDURE — 97535 SELF CARE MNGMENT TRAINING: CPT | Mod: GO

## 2021-06-30 PROCEDURE — 97166 OT EVAL MOD COMPLEX 45 MIN: CPT | Mod: GO

## 2021-06-30 PROCEDURE — 96125 COGNITIVE TEST BY HC PRO: CPT | Mod: GO

## 2021-06-30 PROCEDURE — 90832 PSYTX W PT 30 MINUTES: CPT | Mod: 95 | Performed by: PSYCHOLOGIST

## 2021-06-30 NOTE — PROGRESS NOTES
Cognitive Assessment of [place]    SUMMARY OF TEST:  The Cognitive Assessment of [place] (CAM) is a standardized measure used to assess cognitive abilities and deficits.  The CAM is formatted to assess cognitive skills in the areas of attention, orientation, memory, following directions, temporal awareness, discrimination, sequencing, calculation, planning, verbal safety/judgment, problem solving and abstract reasoning.    DATE OF TESTIN2021    RESULTS OF TESTING:    The patient scores with no impairment in Attention, Remote memory, Recent memory, Visual neglect, Appropriate yes/no, One step verbal directions, Written directions, Immediate auditory memory, Temporal awareness, Matching, Object identification, Auditory recall/recognition, [place] recognition, Single digit math skills, Planning, Simple problem solving, Mental flexibility and Safety/judgment    The patient scores with mild impairment in Visual memory/sequencing backward, Motor recall/recognition, Auditory memory/sequencing backward and Money skills mental manipulation    The patient scores with moderate impairment in Immediate motor memory, Visual memory/sequencing forward, Auditory memory/sequencing forward, Moderate problem solving and Abstract reasoning    The patient scores with severe impairment in Multiple digit math skills and Complex problem solving    Test results comments:  Assessment administered in quiet environment with minimal distractions.     INTERPRETATION OF TEST RESULTS:      Patient performs well on basic, familiar tasks, such as remote/recent knowledge related to self, following directions, temporal awareness, matching/object identification, simple math skills, and safety/judgment. Patient was only able to copy 2/3 motor movements on immediate memory and had significant difficulty with visual memory and sequencing, which is consistent with previous visuospatial testing. Patient also had difficulty with auditory memory and  sequencing, often mixing up the order or including letters not used in the given sequence. Overall, greater difficulty noted with larger sequences of information (4+) versus delayed recall of 3 items. Significant difficulty also noted with multiple digit math skills. Patient noting math is not a strength even at baseline although she was unable to provide a correct answer for 3 of the 4 multiple digit problems and could not determine how to start division problem. Similarly required extra time for mental manipulation of coins/money. Performed well with simple problem-solving task but col[place] a mid-sized square for moderate problem-solving task. Was able to determine folding as alternative solution to square problem. On complex problem-solving, included 8/9 tasks and one task was out of order (did not list bakery last). Finally, difficulty determining abstract meaning of proverbs, relying on memory of phrases she has learned in the past rather than deducing meaning of current phrases given.    Factors affecting performance:    New or complex medical issue --cognitive changes of unknown etiology    Recommendations: Patient will benefit from skilled occupational therapy services to promote short-term/working memory, number manipulation skills, divided attention, and problem-solving skills to support higher level ADLs/IADLs, such as medication and financial management and work tasks.    TIME ADMINISTERING TEST: 32 min    TIME FOR INTERPRETATION AND PREPARATION OF REPORT: 20 min    TOTAL TIME: 52 min    Sravanthi Jones OTR/L

## 2021-06-30 NOTE — PROGRESS NOTES
06/30/21 0800   General Information   Start Of Care Date 06/30/21   Referring Physician Alexx Still, DO   Orders Evaluate and treat as indicated   Orders Date 06/04/21   Medical Diagnosis Subjective Cognitive Impairment   Onset of Illness/Injury or Date of Surgery 01/01/14  (Pt notes cognitive changes started in 2014)   Special Instructions Cognitive Assessment (visual spatial and concentration)   Surgical/Medical History Reviewed Yes   Additional Occupational Profile Info/Pertinent History of Current Problem Kelley Serra is a 30 year old female who presents to OP OT today for evaluation of cognitive changes.  The patient has a pertinent medical history of Hashimotos's thyroiditis (+TPO, on levothyroxine), stage 1A ovarian cancer s/p R-oophorectomy,  And generalized anxiety.  During visits with endocrinology, the patient reported a 5 year history of cognitive/functional dysfunction, dry skin, and fatigue.  Prior treatment with prozac for 2 years wasn't helpful for depression, she is vegan and takes b12 supplements, and she sleeps 8 hours per night. Today, the patient reports weird symptoms since 2014.  She has described difficulty with focus relating to information retention, saying the wrong words at times in conversation.  The patient's mother was diagnosed with cancer in 2014, which did cause some stress in the patient's life, but otherwise no major other events occurred at that time. The patient has had headaches and migraines in her past.  She describes her last migraines occurring in 2016, and described waking up with extreme pain (not being able to see out of one eye (can't recall which eye)). She does report increased fatigue and being tired easily with basic exercise. Her cognitive screen in early June 2021 was without major deficit, but did show some impairment in visual-spatial tasks as well as some slowed word production.   Comments/Observations Pt is very pleasant and engages  well in today's session.   Role/Living Environment   Current Community Support Family/friend caregiver   Patient role/Employment history Employed  ()   Community/Avocational Activities Has been doing writing pieces for small businesses.    Current Living Environment House  (With spouse)   Number of Stairs Within Home   (2 sets of stairs)   Prior Level - Transfers Independent   Prior Level - Ambulation Independent   Prior Level - ADLS Independent   Prior Responsibilities - IADL Meal Preparation;Housekeeping;Laundry;Shopping;Yardwork;Medication management;Finances;Driving;Work   Prior Level Comments IND with ADLs/IADLs   Role/Living Environment Comments Pt is working from home as  but this has been cognitively challenging at times. Is very independent with IADLs and enjoys yoga 3x/week.   Patient/family Goals Statement Better understand cognitive changes and how to overcome them   Pain   Patient currently in pain No   Fall Risk Screen   Fall screen completed by OT   Have you fallen 2 or more times in the past year? No   Have you fallen and had an injury in the past year? No   Abuse Screen (yes response referral indicated)   Feels Unsafe at Home or Work/School no   Feels Threatened by Someone no   Does Anyone Try to Keep You From Having Contact with Others or Doing Things Outside Your Home? no   Physical Signs of Abuse Present no   Cognitive Status Examination   Orientation Orientation to person, place and time   Level of Consciousness Alert   Follows Commands and Answers Questions 100% of the time;Able to follow single-step instructions;Able to follow multistep instructions   Personal Safety and Judgment Intact   Memory Impaired   Memory Comments See CAM results.   Attention Reports problems attending   Organization/Problem Solving Problem solving impaired   Executive Function Working memory impaired, decreased storage of information for performing tasks   Cognitive Comment Has had  "chronic brain fog that seems to have progressed ever since 2014 (this was the year her mom was diagnosed with cancer and then pt was dx next year). Has tried to make dietary changes, exercise, other lifestyle changes with no success. Concentration/attention difficulties. Go to say a word and says the wrong word. If reading directions, able to follow along (can read along with page) but if has to remember rules (Such as board game, difficulty remembering while playing). With brain fog, sometimes feels like \"head is full of cotton\" then perception/other senses feel \"off.\" See separate CAM note.   Visual Perception   Visual Perception No deficits were identified   Visual Perception Comments Denies vision changes. Occasionally wears glasses but doesn't need them for regular basis.   Strength   Strength Comments To be further assessed prn.   Hand Strength   Hand Strength Comments To be further assessed prn.   Coordination   Coordination Comments To be further assessed prn.   Functional Mobility   Ambulation IND   Transfer Skill   Level of [place]: Transfers independent   Bathing   Level of [place] - Bathing independent   Upper Body Dressing   Level of [place]: Dress Upper Body independent   Lower Body Dressing   Level of [place]: Dress Lower Body independent   Toileting   Level of [place]: Toilet independent   Grooming   Level of [place]: Grooming independent   Eating/Self-Feeding   Level of [place]: Eating independent   Activity Tolerance   Activity Tolerance Energy levels are a little down, feels she's a little fatigued. Thinks this is related to stress/trying to find solution. Does yoga 3x/week.   Instrumental Activities of Daily Living Assessment   IADL Assessment/Observations Medication management- does this on her own.Takes them directly out of bottle. No difficulties remembering to take these. 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   Meal Planning/Preparation Does a lot of the cooking. Loves to cook and feels this is going well.  helps clean up. Feels she is able to complete familiar recipes, no difficulty remembering. Able to follow along with new recipes. Multi-tasking with meal prep feels fairly comfortable, no major complaints.   Home/Financial Management Splits bill-paying and shopping with spouse. No challenges here. Has most of groceries delivered to house. Most bills online/automatic.   Communication/Computer Use Uses computer and phone all day (for work - ). This is going well -  is also very tech savvy - has help if she needs it.   Community Mobility Drives self. Not getting lost when going to/from familiar places. Uses GPS when traveling to new places.   Planned Therapy Interventions   Planned Therapy Interventions ADL training;IADL training;Cognitive skills;Cognitive performance testing;Community/work reintegration;Self care/Home management;Therapeutic activities    OT Goal 1   Goal Identifier Memory Compensation for ADLs/IADLs   Goal Description Patient will demonstrate improved memory skills by completing short-term memory tasks in occupational therapy with 90% accuracy using compensatory strategies for increased safety and independence with ADL/IADLS (keeping up schedule, remembering to take medications, turn off the stove when done, remember to switch laundry).   Target Date 09/22/21    OT Goal 2   Goal Identifier Problem-Solving for Higher Level ADLs/IADLs   Goal Description Pt will demonstrate the ability to complete moderate to complex tasks requiring numerical reasoning and problem-solving skills with 90% accuracy to complete self-care and home tasks safely and accurately (e.g. meal preparation, financial management, medication management, work).   Target  Date 09/22/21    OT Goal 3   Goal Identifier Divided Attention for Higher Level ADLs/IADLs   Goal Description Pt to be able to alternate between 2-3 different tasks ( eg bill paying, word puzzle and making phone calls) x 15 minutes with 90% accuracy on all tasks, to stimulate return to higher level work, cooking, ability to grocery shop, and maneuver with passengers in the community.   Target Date 09/22/21   OT Goal 4   Goal Identifier Energy Conservation   Goal Description Pt will demonstrate 3 energy conservation strategies (e.g. pacing, planning, prioritizing, sleep hygiene, etc.) to facilitate fatigue management with ADL/IADL tasks (e.g. laundry, meal preparation, walking/exercise).   Target Date 09/22/21   Clinical Impression   Criteria for Skilled Therapeutic Interventions Met Yes, treatment indicated   OT Diagnosis Impaired IND and safety with ADLs/IADLs   Influenced by the following impairments cognition (attention, memory, proble-solving), activity tolerance   Assessment of Occupational Performance 3-5 Performance Deficits   Identified Performance Deficits socialization, community engagement, hobbies/recreation, work   Clinical Decision Making (Complexity) Moderate complexity   Therapy Frequency 1x/week   Predicted Duration of Therapy Intervention (days/wks) 12 weeks (starting with 6 weeks scheduled)   Risks and Benefits of Treatment have been explained. Yes   Patient, Family & other staff in agreement with plan of care Yes   Clinical Impression Comments Pt will benefit from skilled OT services to address above deficits and promote IND with ADLs/IADLs   Education Assessment   Barriers To Learning Cognitive   Preferred Learning Style Listening;Reading;Demonstration;Pictures/video   Total Evaluation Time   OT Eval, Moderate Complexity Minutes (11575) 25     Sravanthi Jones, OTR/L

## 2021-06-30 NOTE — PROGRESS NOTES
"Progress Note     Client Name:  Oscar Serra Date: 6/30/2021         Service Type: Individual  Video Visit: Yes, the patient's condition can be safely assessed and treated via synchronous audio and visual telemedicine encounter.      Reason for Video Visit: Services only offered telehealth    Location of Originating Site: Patient's home    Distant Site: Provider Home Office     Session Start Time: 2:00  Session End Time: 2:20     Session Length: 20 minutes    Session #: 2     Attendees:  Client attended alone     Intervention: reviewed skills for coping with anxiety; motivational interviewing: explored potential barriers for making healthy changes    Identifying Information:  Client is a 30 year old, ,  female. Client was referred for a diagnostic assessment by PCP. The purpose of this evaluation is to: provide treatment recommendations and clarify diagnosis.  Client is currently  employed part time and reports she is able to function appropriately at work.. Client attended the session alone.       Client's Statement of Presenting Concern:  Client reported seeking services at this time for diagnostic assessment and recommendations for treatment. Client's presenting concerns include: Client is having a hard time focusing. This has gotten worse since 2014 (when her mother was diagnosed with cancer). She has \"brain fog\" and forgets words \"at the last second.\" It is hard to understand new information. She can't concentrate and she \"tunes people out\" when they are talking to her. She has to try really hard to focus. She has had a lot of medical testing to rule out physical causes; she had an MRI last week to rule out issues. A neurologist recently determined her attention was poor and recommended testing for ADHD. It takes her longer to get things done now (things take twice as long to get done than it did in 2014). She used to be efficient and could multitask and now she is unable to do that and everything " "feels effortful. She has trouble focusing on one thing at a time. Her mind jumps from one thing to the next (this is especially bad when she is anxious or emotionally upset - e.g., feeling nervous/anxious, conflict with someone, waiting for a test result). She finds it difficult to follow instructions unless they are written down. She can't focus if there are loud noises going on (e.g., construction outside, someone is using machine tools, people talking loudly around her). She tends to feel \"overstimulated\" in large/busy place (e.g., grocery stores, board game places when there is a lot going on around her - this feels overwhelming).  Client stated that symptoms have resulted in the following functional impairments: health maintenance, management of the household and or completion of tasks and work / vocational responsibilities.         History of Presenting Concern:  Client reported that she has not completed a previous ADHD diagnostic assessment.  Client has not received a previous diagnosis of ADHD.  Client reported that medication has not been prescribed medication to address these problems. Client reported that these problems began a few years ago. She stated, \"I never had these problems as a kid; they came on very suddenly.\" She feels she really started noticing this in 2014. She doesn't recall having these issues when she was younger. She stated, \"Could be the stress of everything going on with my mom's health and mind.\" She thinks these things have worsened as the years go by. She did not have chemotherapy or radiation for her cancer (\"just surgery\"). Client has not attempted to resolve these concerns in the past. Client reported that other professionals are not involved in providing support / services.       Social History:  As a child, client reported that she did not have problems related to ADHD in childhood. Client reported no difficulty with childhood peer relationships.  As a child, client reported " having regular and consistent sleep patterns.  Client reported currently experiencing regular and consistent sleep patterns.  Client reported sleeping approximately 7-8 hours per night. She might wake up feeling tired but she sleeps well. Client reported that she has not completed a sleep study.  Client reported having a well-balanced diet.  There are not significant nutritional concerns.  Client reported sporadic exercise patterns.      Client's highest education level was college graduate. Client graduated high school in 2006. She estimated she obtained mostly As and Bs. During the elementary, middle, and high school years, patient recalls academic strengths in the area of writing and English. Client reported experiencing academic problems in math and science. Client did not identify any learning problems. Client did not receive tutoring services during the school years. Client did not receive special education services. Client reported no particular problems during the school years. She doesn't recall struggling in school. She was able to focus and get her homework done on time. She was independent when it came to school work. Client did attend post-secondary school.  Client graduated from [employer] ([employer]) in 2011 with a degree in professional writing. She reported she did well and got good grades (mostly As and Bs) and turned assignments in on time. She was able to manage her time and study.    Client reported that she is currently employed part-time. She was working full-time as a  and cut back to part time due to attention issues. It has been hard for her to focus. 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 She noted that the topics she works on vary and it depends on the day. Client reported that the current job is a good fit for her skills and personality.  Client reported that she distractible behavior and poor time  "management (only recently) .  The client's work history includes:  work (out of college) - she always got good feedback from others and didn't struggle there. She also worked as a writer for a company for 6 years. The longest period of employment has been 6 years.  Client has not been terminated from a place of employment.       Risk Taking Behaviors:  Client reported no history of risk taking behaviors - she is the \"opposite\" of this and is always over-thinking things      Motor Vehicle Operation:  Client has received a 's license.  Client has not received any moving violations.  Client reported the following driving habits: attentive and cautious.  She is nervous about not seeing someone so she is anxious and very attentive. According to client, other people are comfortable riding as a passenger when she is driving.        Mental Status Assessment:  Appearance:   Appropriate   Eye Contact:   Good   Psychomotor Behavior: Normal   Attitude:   Cooperative   Orientation:   All  Speech   Rate / Production: Normal    Volume:  Normal   Mood:    Normal  Affect:    Appropriate   Thought Content:  Clear   Thought Form:  Coherent  Logical   Insight:    Good       Review of Symptoms:  Depression:     Change in sleep, Excessive or inappropriate guilt, Change in energy level, Difficulties concentrating and Psychomotor slowing or agitation  Laverne:             No Symptoms  Psychosis:       No Symptoms  Anxiety:           Excessive worry, Nervousness and Irritability  Panic:              Palpitations, Shortness of breath, Hot or cold flashes and sweaty; triggered if she is worried about her health or a loved one's health (when someone is sick); these happen once every couple of months (a few times per year)  Post Traumatic Stress Disorder:  Experienced traumatic event health issues   Eating Disorder:          No Symptoms  ADD / ADHD:              Inattentive, Difficulties listening, Poor task completion and " Distractibility  Conduct Disorder:       No symptoms  Autism Spectrum Disorder:     No symptoms  Obsessive Compulsive Disorder:       No Symptoms  Reckless Behavior: No symptoms        Safety Issues and Plan for Safety and Risk Management:  Client denies a history of suicidal ideation, suicide attempts, self-injurious behavior, homicidal ideation, homicidal behavior and and other safety concerns    Client denies current fears or concerns for personal safety.  Client denies current or recent suicidal ideation or behaviors.  Client denies current or recent homicidal ideation or behaviors.  Client denies current or recent self injurious behavior or ideation.  Client denies other safety concerns.  Client reports there are no firearms in the house.  Recommended that patient call 911 or go to the local ED should there be a change in any of these risk factors.        Diagnostic Criteria:  - Often has difficulty sustaining attention in tasks or play activities  - Often avoids, dislikes, or is reluctant to engage in tasks that require sustained mental effort  - Is often easily distractedby extraneous stimuli    A. The development of emotional or behavioral symptoms in response to an identifiable stressor(s) occurring within 3 months of the onset of the stressor(s)  B. These symptoms or behaviors are clinically significant, as evidenced by one or both of the following:       - Significant impairment in social, occupational, or other important areas of functioning  C. The stress-related disturbance does not meet criteria for another disorder & is not not an exacerbation of another mental disorder  D. The symptoms do not represent normal bereavement  E. Once the stressor or its consequences have terminated, the symptoms do not persist for more than an additional 6 months       * Adjustment Disorder with Anxiety: The predominant manfestations are symptoms such as nervousness, worry, or jitteriness, or, in children separation  anxiety from major attachment figures    Functional Status:  Client's symptoms are causing reduced functional status in the following areas: health maintenance, management of the household and or completion of tasks and work / vocational responsibilities.         DSM-5Diagnoses: (Sustained by DSM5 Criteria Listed Above)    Adjustment Disorders  309.24 (F43.22) With anxiety    RULE OUT: ADHD    Attendance Agreement:  Client has signed Attendance Agreement:No: unable to sign via telehealth      Preliminary Plan:  The client reports no currently identified Shinto, ethnic or cultural issues relevant to therapy.     services are not indicated.    Modifications to assist communication are not indicated.    Collaboration / coordination of treatment will be initiated with the following support professionals: primary care physician.    Referral to another professional/service is not indicated at this time.    A Release of Information is not needed at this time.    Client was given self and collaborative rating scales to be completed prior to the next appointment. Client consented to sending/receiving these measures via email.   Depression and anxiety rating scales were completed.  A third appointment was scheduled at this time. Scheduled feedback for 7/16.    Report to child / adult protection services was NA.    Patient will have open access to their mental health medical record.    Morenita Maya, PhD, LP  June 30, 2021

## 2021-07-01 ENCOUNTER — DOCUMENTATION ONLY (OUTPATIENT)
Dept: PSYCHOLOGY | Facility: CLINIC | Age: 31
End: 2021-07-01

## 2021-07-14 ENCOUNTER — HOSPITAL ENCOUNTER (OUTPATIENT)
Dept: OCCUPATIONAL THERAPY | Facility: CLINIC | Age: 31
Setting detail: THERAPIES SERIES
End: 2021-07-14
Attending: PSYCHIATRY & NEUROLOGY
Payer: COMMERCIAL

## 2021-07-14 PROCEDURE — 97535 SELF CARE MNGMENT TRAINING: CPT | Mod: GO

## 2021-07-16 ENCOUNTER — MYC MEDICAL ADVICE (OUTPATIENT)
Dept: FAMILY MEDICINE | Facility: CLINIC | Age: 31
End: 2021-07-16

## 2021-07-16 ENCOUNTER — VIRTUAL VISIT (OUTPATIENT)
Dept: PSYCHOLOGY | Facility: CLINIC | Age: 31
End: 2021-07-16
Payer: COMMERCIAL

## 2021-07-16 DIAGNOSIS — F43.22 ADJUSTMENT DISORDER WITH ANXIETY: Primary | ICD-10-CM

## 2021-07-16 PROCEDURE — 96131 PSYCL TST EVAL PHYS/QHP EA: CPT | Mod: GT | Performed by: PSYCHOLOGIST

## 2021-07-16 PROCEDURE — 96130 PSYCL TST EVAL PHYS/QHP 1ST: CPT | Mod: GT | Performed by: PSYCHOLOGIST

## 2021-07-16 NOTE — PROGRESS NOTES
Client Name: sOcar Serra Date: 7/16/2021    Service Type: Individual (ADHD Evaluation feedback session)     Session Start Time: 10:00 Session End Time: 10:20      Session Length: 20 minutes      Session #: (feedback)     Attendees: Client attended alone    Telemedicine Visit: The patient's condition can be safely assessed and treated via synchronous audio and visual telemedicine encounter.      Reason for Telemedicine Visit: Services only offered telehealth    Originating Site (Patient Location): Patient's home    Distant Site (Provider Location): Provider Home Office    Consent:  The patient/guardian has verbally consented to: the potential risks and benefits of telemedicine (video visit) versus in person care; bill my insurance or make self-payment for services provided; and responsibility for payment of non-covered services.     Mode of Communication:  Video Conference via [employer]    As the provider I attest to compliance with applicable laws and regulations related to telemedicine.          DATA        Treatment Objective(s) Addressed in This Session:   Provided feedback on ADHD evaluation. Reviewed test results in depth and answered client's questions. Client diagnosed with Adjustment Disorder with anxiety. This provider also completed full written report of evaluation, including integration of testing data, summary, and recommendations. Please see Documentation Only dated 7/1/21.     Progress on / Status of Treatment Objective(s) / Homework:   Completed      Intervention:  ADHD Evaluation feedback; Reviewed report (can be found in Documentation Only encounter dated 7/1/21); Client was appreciative of the feedback and expressed understanding of the diagnoses. Client reported she has taken Fluoxetine in the past and might have felt better while on this and may be open to discussing this again with her doctor. She feels she has been more tearful lately and more emotional and would like to address this. She  would also like an order placed for individual therapy (this will be placed today).         ASSESSMENT: Current Emotional / Mental Status (status of significant symptoms):  Risk status (Self / Other harm or suicidal ideation)  Client denies current fears or concerns for personal safety.  Client denies current or recent suicidal ideation or behaviors.  Client denies current or recent homicidal ideation or behaviors.  Client denies current or recent self-injurious behavior or ideation.  Client denies other safety concerns.  A safety and risk management plan has not been developed at this time, however client was given the after-hours number / 911 should there be a change in any of these risk factors.     Appearance: Unable to assess on phone   Eye Contact:  Unable to assess on phone  Psychomotor Behavior:  Unable to assess on phone  Attitude: Cooperative   Orientation: All  Speech  Rate / Production: Normal   Volume: Normal   Mood: Normal  Affect: Appropriate   Thought Content: Clear   Thought Form: Coherent Logical   Insight: Good      Medication Review:  Client is not currently prescribed psychiatric medications     Medication Compliance:  NA     Changes in Health Issues:  None reported     Chemical Use Review:  Substance Use: Chemical use reviewed, no active concerns identified      Tobacco Use: No current tobacco use.      Collateral Reports Completed:  Routed note to Care Team Member(s)     PLAN: (Homework, other)       Recommendations:    1. Schedule a medication evaluation with your physician/psychiatrist. Medications are often beneficial in treating anxiety and depression symptoms.      2. Access resources through websites, books, and articles such as those provided in the Adult ADHD Symptom Management handout.       3. Individual therapy is recommended. Therapies focused on identifying and challenging problematic thought and behavior patterns while increasing the use of healthy coping skills has been found to  be effective in treating anxiety and depression. It will be important to set goals in this therapy and work actively toward achieving short-term successes that lead to the completion of each goal. Action-oriented therapies, such as CBT and ACT are particularly recommended for the treatment of chronic anxiety and depression.     4. The use of behavioral strategies such as diaphragmatic breathing, guided imagery, and mindfulness is often helpful in the management of anxiety symptoms.     5. The following compensatory strategies may be useful to cope with reported inattention symptoms:   a. Maintaining a predictable routine and structured environment that incorporates prioritized checklists and reminders (e.g., Post-Its).  b. When completing tasks, try to focus on one task at a time and complete it in its entirety before moving on to the next task.  c. Minimize background distractions when working on complex tasks. For example, TV, radio or ongoing conversations in the background may hinder ability to focus on the task at hand.  d. Take regular breaks from tasks that require prolonged attention. In general, regular breaks from complex tasks can help prevent lapses in attention, which can result in errors.  e. Outline the steps required to complete a task prior to beginning it, which can help ensure an organized approach. Use the outline to refer to throughout the task as a reminder of the steps to be completed.        Morenita Maya, Ph.D.,   Licensed Psychologist        Psychological Testing   Billing/Services Summary       Testing Evaluation Services Base: 66027  (1st 60 mins) Add-on: 55626  (each addtl 60 mins)   Record Review and Clarify Referral Question   (12:45/1:00), (6/23/21) 15 minutes   Integration/Report Generation   (12:00/1:00), (6/24/21) - MMPI-2  (3:00/4:00) (6/29/21) - Bertha Scales  (3:00/4:00), (7/1/21) - Report 60 minutes  60 minutes  60 minutes     Interactive Feedback Session  (10:00/10:20),  (7/16/21) 20 minutes   Total Time: 215 minutes (3 hours, 35 minutes)   Total Units: 1 3           Diagnosis(es): (ICD-10)  296.36 (F33.42) Major Depressive Disorder, Recurrent Episode, In full remission

## 2021-07-16 NOTE — TELEPHONE ENCOUNTER
LS,  Pt saw you 6/18 in [facility] for attention/anxiety issues.  Please see below and advise.  Thanks,  Leticia Salmeron RN

## 2021-07-21 ASSESSMENT — ANXIETY QUESTIONNAIRES
1. FEELING NERVOUS, ANXIOUS, OR ON EDGE: MORE THAN HALF THE DAYS
GAD7 TOTAL SCORE: 6
GAD7 TOTAL SCORE: 6
6. BECOMING EASILY ANNOYED OR IRRITABLE: SEVERAL DAYS
4. TROUBLE RELAXING: NOT AT ALL
2. NOT BEING ABLE TO STOP OR CONTROL WORRYING: MORE THAN HALF THE DAYS
7. FEELING AFRAID AS IF SOMETHING AWFUL MIGHT HAPPEN: NOT AT ALL
5. BEING SO RESTLESS THAT IT IS HARD TO SIT STILL: NOT AT ALL
8. IF YOU CHECKED OFF ANY PROBLEMS, HOW DIFFICULT HAVE THESE MADE IT FOR YOU TO DO YOUR WORK, TAKE CARE OF THINGS AT HOME, OR GET ALONG WITH OTHER PEOPLE?: VERY DIFFICULT
7. FEELING AFRAID AS IF SOMETHING AWFUL MIGHT HAPPEN: NOT AT ALL
GAD7 TOTAL SCORE: 6
3. WORRYING TOO MUCH ABOUT DIFFERENT THINGS: SEVERAL DAYS

## 2021-07-21 ASSESSMENT — PATIENT HEALTH QUESTIONNAIRE - PHQ9
SUM OF ALL RESPONSES TO PHQ QUESTIONS 1-9: 7
10. IF YOU CHECKED OFF ANY PROBLEMS, HOW DIFFICULT HAVE THESE PROBLEMS MADE IT FOR YOU TO DO YOUR WORK, TAKE CARE OF THINGS AT HOME, OR GET ALONG WITH OTHER PEOPLE: SOMEWHAT DIFFICULT
SUM OF ALL RESPONSES TO PHQ QUESTIONS 1-9: 7

## 2021-07-21 NOTE — PROGRESS NOTES
Oscar is a 31 year old who is being evaluated via a billable video visit.      How would you like to obtain your AVS? Lucian  If the video visit is dropped, the invitation should be resent by: Lucian or else Text to cell phone: [phone]  Will anyone else be joining your video visit? No      Video Start Time: 4:45 pm     Assessment & Plan     Adjustment disorder with anxiety  Lack of focus and attention because of anxiety , we discussed different medications , she has had success with Prozac in the past but would like to start something different this time, would like to try Effexor , to help with focus more , will start at 37.5 mg daily for a week then go to the 75 mg daily and recheck with a OV with me in four weeks , discussed pros and cons on the medication and what to watch for etc   - venlafaxine (EFFEXOR-XR) 37.5 MG 24 hr capsule; Take 1 capsule (37.5 mg) by mouth daily Take one 37.5 mg daily for a week , then start taking 75 mg daily dose  - venlafaxine (EFFEXOR-XR) 75 MG 24 hr capsule; Take 1 capsule (75 mg) by mouth daily Start taking this after one week on the 37.5 mg capsule      RTC if no improving or worsening.  Follow up in one month sooner if any worsening or new symptoms develop .  Pt is aware  and comfortable with the current plan.      Ariadne Jimenez MD  [facility]   Oscar is a 31 year old who presents for the following health issues     HPI     Pt here to discuss starting medication for anxiety, depression, inattention - dx mild depression and adjustment disorder w/anxiety, ruled out ADHD/ADD. Previously tried fluoxetine but found it was not helpful, wants to try venlafaxine.     Depression and Anxiety Follow-Up    How are you doing with your depression since your last visit? Worsened     How are you doing with your anxiety since your last visit?  Worsened     Are you having other symptoms that might be associated with depression or anxiety? Yes:  More  Tearfullness    Have you had a significant life event? No     Do you have any concerns with your use of alcohol or other drugs? No    Social History     Tobacco Use     Smoking status: Never Smoker     Smokeless tobacco: Never Used   Substance Use Topics     Alcohol use: Yes     Comment: Socially     Drug use: No     PHQ 4/26/2019 6/18/2021 6/23/2021   PHQ-9 Total Score 4 4 7   Q9: Thoughts of better off dead/self-harm past 2 weeks Not at all Not at all Not at all     JOHN PAUL-7 SCORE 4/26/2019 6/18/2021 6/23/2021   Total Score - - 6 (mild anxiety)   Total Score 3 1 6       Answers for HPI/ROS submitted by the patient on 7/21/2021  If you checked off any problems, how difficult have these problems made it for you to do your work, take care of things at home, or get along with other people?: Somewhat difficult  PHQ9 TOTAL SCORE: 7  JOHN PAUL 7 TOTAL SCORE: 6        Last PHQ-9 7/21/2021   1.  Little interest or pleasure in doing things 0   2.  Feeling down, depressed, or hopeless 1   3.  Trouble falling or staying asleep, or sleeping too much 0   4.  Feeling tired or having little energy 1   5.  Poor appetite or overeating 0   6.  Feeling bad about yourself 2   7.  Trouble concentrating 2   8.  Moving slowly or restless 1   Q9: Thoughts of better off dead/self-harm past 2 weeks 0   PHQ-9 Total Score 7   Difficulty at work, home, or with people -     JOHN PAUL-7  7/21/2021   1. Feeling nervous, anxious, or on edge 2   2. Not being able to stop or control worrying 2   3. Worrying too much about different things 1   4. Trouble relaxing 0   5. Being so restless that it is hard to sit still 0   6. Becoming easily annoyed or irritable 1   7. Feeling afraid, as if something awful might happen 0   JOHN PAUL-7 Total Score 6   If you checked any problems, how difficult have they made it for you to do your work, take care of things at home, or get along with other people? -       Suicide Assessment Five-step Evaluation and Treatment (SAFE-T)      How  many servings of fruits and vegetables do you eat daily?  4 or more    On average, how many sweetened beverages do you drink each day (Examples: soda, juice, sweet tea, etc.  Do NOT count diet or artificially sweetened beverages)?   0    How many days per week do you exercise enough to make your heart beat faster? 3 or less    How many minutes a day do you exercise enough to make your heart beat faster? 30 - 60    How many days per week do you miss taking your medication? 0       She has had the neuropsychological evaluation and found to have Adjustment disorder with anxiety   She has scheduled a therapy appointment , is here to discussed medications, she used to be on Prozac but ran out of the Rx a year ago and initially did not think she needed it but now has been having more symptoms of anxiety , tearful and emotional    Review of Systems   Constitutional, HEENT, cardiovascular, pulmonary, GI, , musculoskeletal, neuro, skin, endocrine and psych systems are negative, except as otherwise noted.      Objective           Vitals:  No vitals were obtained today due to virtual visit.    Physical Exam   GENERAL: Healthy, alert and no distress  EYES: Eyes grossly normal to inspection.  No discharge or erythema, or obvious scleral/conjunctival abnormalities.  RESP: No audible wheeze, cough, or visible cyanosis.  No visible retractions or increased work of breathing.    SKIN: Visible skin clear. No significant rash, abnormal pigmentation or lesions.  NEURO: Cranial nerves grossly intact.  Mentation and speech appropriate for age.  PSYCH: Mentation appears normal, affect normal/bright, judgement and insight intact, normal speech and appearance well-groomed.    No results found for this or any previous visit (from the past 24 hour(s)).            Video-Visit Details    Type of service:  Video Visit    Video End Time:4:58 PM    Originating Location (pt. Location): Home    Distant Location (provider location):  [facility]  [facility]     Platform used for Video Visit: Miller

## 2021-07-22 ASSESSMENT — ANXIETY QUESTIONNAIRES: GAD7 TOTAL SCORE: 6

## 2021-07-23 ENCOUNTER — VIRTUAL VISIT (OUTPATIENT)
Dept: FAMILY MEDICINE | Facility: CLINIC | Age: 31
End: 2021-07-23
Payer: COMMERCIAL

## 2021-07-23 DIAGNOSIS — F43.22 ADJUSTMENT DISORDER WITH ANXIETY: Primary | ICD-10-CM

## 2021-07-23 PROCEDURE — 99214 OFFICE O/P EST MOD 30 MIN: CPT | Mod: GT | Performed by: FAMILY MEDICINE

## 2021-07-23 RX ORDER — VENLAFAXINE HYDROCHLORIDE 37.5 MG/1
37.5 CAPSULE, EXTENDED RELEASE ORAL DAILY
Qty: 7 CAPSULE | Refills: 0 | Status: SHIPPED | OUTPATIENT
Start: 2021-07-23 | End: 2021-10-08

## 2021-07-23 RX ORDER — VENLAFAXINE HYDROCHLORIDE 75 MG/1
75 CAPSULE, EXTENDED RELEASE ORAL DAILY
Qty: 30 CAPSULE | Refills: 1 | Status: SHIPPED | OUTPATIENT
Start: 2021-07-23 | End: 2022-01-12

## 2021-07-23 ASSESSMENT — PATIENT HEALTH QUESTIONNAIRE - PHQ9: SUM OF ALL RESPONSES TO PHQ QUESTIONS 1-9: 7

## 2021-07-28 ENCOUNTER — HOSPITAL ENCOUNTER (OUTPATIENT)
Dept: OCCUPATIONAL THERAPY | Facility: CLINIC | Age: 31
Setting detail: THERAPIES SERIES
End: 2021-07-28
Attending: PSYCHIATRY & NEUROLOGY
Payer: COMMERCIAL

## 2021-07-28 PROCEDURE — 97535 SELF CARE MNGMENT TRAINING: CPT | Mod: GO

## 2021-07-30 ENCOUNTER — MYC MEDICAL ADVICE (OUTPATIENT)
Dept: FAMILY MEDICINE | Facility: CLINIC | Age: 31
End: 2021-07-30

## 2021-08-04 ENCOUNTER — HOSPITAL ENCOUNTER (OUTPATIENT)
Dept: OCCUPATIONAL THERAPY | Facility: CLINIC | Age: 31
Setting detail: THERAPIES SERIES
End: 2021-08-04
Attending: PSYCHIATRY & NEUROLOGY
Payer: COMMERCIAL

## 2021-08-04 PROCEDURE — 97535 SELF CARE MNGMENT TRAINING: CPT | Mod: GO

## 2021-08-04 PROCEDURE — 97530 THERAPEUTIC ACTIVITIES: CPT | Mod: GO

## 2021-08-10 ENCOUNTER — VIRTUAL VISIT (OUTPATIENT)
Dept: GASTROENTEROLOGY | Facility: CLINIC | Age: 31
End: 2021-08-10
Attending: FAMILY MEDICINE
Payer: COMMERCIAL

## 2021-08-10 DIAGNOSIS — R14.2 FLATULENCE, ERUCTATION AND GAS PAIN: ICD-10-CM

## 2021-08-10 DIAGNOSIS — R14.3 FLATULENCE, ERUCTATION AND GAS PAIN: ICD-10-CM

## 2021-08-10 DIAGNOSIS — R14.0 ABDOMINAL BLOATING: Primary | ICD-10-CM

## 2021-08-10 DIAGNOSIS — R14.1 FLATULENCE, ERUCTATION AND GAS PAIN: ICD-10-CM

## 2021-08-10 PROCEDURE — 99203 OFFICE O/P NEW LOW 30 MIN: CPT | Mod: GT | Performed by: PHYSICIAN ASSISTANT

## 2021-08-10 NOTE — PROGRESS NOTES
GASTROENTEROLOGY NEW PATIENT VIDEO VISIT     Video Start Time: 1:05 PM    CC/REFERRING MD:    [facility], [facility]  Ariadne Jimenez MD    REASON FOR CONSULTATION:   Referred by Dr. Ariadne Jimenez for Consult (SIBO)      HISTORY OF PRESENT ILLNESS:    Kelley Serra is 31 year old female who presents for evaluation of SIBO. She has had GI issues for a number of years. She recalls having trouble as early as 2006. She had a number of tests done at that time and was given diagnosis of IBS. Over the years she has been able to mostly manage her symptoms with adjusting her diet. She does however currently have abdominal gas and bloating that is very significant and was concerned that these symptoms may be related to SIBO.       Her bloating and gas symptoms occur daily. She does occ have diarrhea but has been able to manage this by avoiding dairy. She may occ have diarrhea once every few months. She does have abdominal pain which does improve with BM's. She in general is having one Bm a day. There is no blood or black colored stools. She occ has reflux issues, a few times a month which usually resolves with tums. No nausea or vomiting. No dysphagia.     In addition to avoiding dairy, she avoids sugar, meat and gluten. She reports eating very healthy.     She does have personal history of ovarian cancer in 2015 s/p exploratory laparotomy and RSO. She is followed annually by oncology. She underwent a colonoscopy in 2015 for concerns related to the ovarian mass. Her colonoscopy was unremarkable. She does have a family hx of colon cancer in her mother who was diagnosed at age 54. Patient denies blood in stool or unexplained weight loss.      Oscar  has a past medical history of Allergic rhinitis due to animal dander, Allergy to mold spores, Asthma, mild intermittent, Cancer (H) (9/2015), Diagnostic skin and sensitization tests (2/26/14 skin tests pos. 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    She  has a past surgical history that includes Laparotomy exploratory (10/2/15); Laparotomy, staging, combined (N/A, 10/2/2015); Esophagoscopy, gastroscopy, duodenoscopy (EGD), combined (N/A, 11/23/2015); Colonoscopy (N/A, 12/2/2015); appendectomy (10/2/2015); biopsy (10/2/2015); and GYN surgery (10/2/2015).    She  reports that she has never smoked. She has never used smokeless tobacco. She reports current alcohol use. She reports that she does not use drugs.    Her family history includes Blood Disease in her maternal grandmother and mother; Breast Cancer in her paternal grandmother; Cancer in her maternal grandfather and maternal grandmother; Cancer - colorectal (age of onset: 54) in her mother; Cerebrovascular Disease in her maternal grandfather and paternal grandfather; Colon Cancer in her mother; Coronary Artery Disease in her father; Diabetes in her maternal grandfather, mother, and paternal grandfather; Heart Disease (age of onset: 56) in her father; Respiratory in her maternal grandmother; Skin Cancer in her paternal grandfather; Substance Abuse in her father; Thyroid Disease in her mother.    ALLERGIES:  Cleocin, Keflex [cephalexin monohydrate], and Zoloft      PERTINENT MEDICATIONS:    Current Outpatient Medications:      BENFOTIAMINE PO, Take 200 mg by mouth, Disp: , Rfl:      LACTOBACILLUS PO, 50 billion, Disp: , Rfl:      levothyroxine (SYNTHROID/LEVOTHROID) 50 MCG tablet, Take 1 tablet (50 mcg) by mouth daily, Disp: 90 tablet, Rfl: 3     loratadine (CLARITIN) 10 MG tablet, Take 1 tablet (10 mg) by mouth daily, Disp: 30 tablet, Rfl: 1     NONFORMULARY, 40 billion CFU S. Bouladarii (probiotic), Disp: , Rfl:      Selenium 200 MCG TABS tablet, Take 200 mcg by mouth daily, Disp: , Rfl:      Turmeric (QC TUMERIC COMPLEX PO), 2400 mg curcurmin w/ black pepper, Disp: , Rfl:       venlafaxine (EFFEXOR-XR) 37.5 MG 24 hr capsule, Take 1 capsule (37.5 mg) by mouth daily Take one 37.5 mg daily for a week , then start taking 75 mg daily dose, Disp: 7 capsule, Rfl: 0     venlafaxine (EFFEXOR-XR) 75 MG 24 hr capsule, Take 1 capsule (75 mg) by mouth daily Start taking this after one week on the 37.5 mg capsule, Disp: 30 capsule, Rfl: 1     Cholecalciferol (VITAMIN D-3 PO), , Disp: , Rfl:      fluocinonide (LIDEX) 0.05 % external cream, Apply a thin layer to affected area BID x 2 weeks, tapering with improvement. Do not apply to face or body folds. (Patient not taking: Reported on 7/23/2021), Disp: 30 g, Rfl: 0     MULTIPLE VITAMINS PO, take  by mouth. (Patient not taking: Reported on 7/23/2021), Disp: , Rfl:       PHYSICAL EXAMINATION:  Constitutional: aaox3, cooperative, pleasant, not dyspneic/diaphoretic, no acute distress      GENERAL: Healthy, alert and no distress  EYES: Eyes grossly normal to inspection.  No discharge or erythema, or obvious scleral/conjunctival abnormalities.  RESP: No audible wheeze, cough, or visible cyanosis.  No visible retractions or increased work of breathing.    SKIN: Visible skin clear. No significant rash, abnormal pigmentation or lesions.  NEURO: Cranial nerves grossly intact.  Mentation and speech appropriate for age.  PSYCH: Mentation appears normal, affect normal/bright, judgement and insight intact, normal speech and appearance well-groomed.          PERTINENT STUDIES: (I personally reviewed these laboratory studies today)  Most recent CBC:   Recent Labs   Lab Test 10/06/20  1423 04/29/19  0850   WBC 7.1 5.1   HGB 13.7 14.1   HCT 41.9 42.5    229     Most recent hepatic panel:  Recent Labs   Lab Test 02/04/21  1023 04/29/19  0850   ALT 20 12   AST 18 14     Most recent creatinine:  Recent Labs   Lab Test 02/04/21  1023 04/29/19  0850   CR 0.76 0.75       TSH   Date Value Ref Range Status   05/28/2021 0.69 0.40 - 4.00 mU/L Final          ASSESSMENT/PLAN:    1. Abdominal bloating  - Adult Gastro Ref - Procedure Only; Future  2. Flatulence, eructation and gas pain      Kelley Serra is a 31 year old female who presents for evaluation of abdominal bloating and gas. She does have history of IBS for a number of years. She has been able to manage most of her IBS symptoms with avoiding dietary triggers. She however has had significant abdominal bloating and gas despite following her typical diet. She is concerned that she could have a concurrent SIBO. Recommended testing for SIBO, orders placed.     In the interim recommended continuing with her management of IBS. Can take gas-x as needed.       Thank you for this consultation.  It was a pleasure to participate in the care of this patient; please contact us with any further questions.      This note was created with voice recognition software, and while reviewed for accuracy, typos may remain.         30 minutes spent on the date of the encounter doing chart review, history and exam, documentation and further activities per the note    Carmen Alarcon PA-C  Gastroenterology  [facility]       Video-Visit Details    Type of service:  Video Visit    Video End Time:1:19 PM    Originating Location (pt. Location): Home    Distant Location (provider location):  [facility]     Platform used for Video Visit: Miller

## 2021-08-10 NOTE — PROGRESS NOTES
Oscar is a 31 year old who is being evaluated via a billable video visit.      How would you like to obtain your AVS? MyChart  If the video visit is dropped, the invitation should be resent by: Text to cell phone: [phone]  Will anyone else be joining your video visit? No      Shyla White LPN on 8/10/21 at 1:02 PM

## 2021-08-10 NOTE — PATIENT INSTRUCTIONS
It was a pleasure visiting with you today.     Please schedule your SIBO breath test at [facility]. If you have not heard from the scheduling office within 2 business days, please call [phone] to schedule.     Carmen Alarcon PA-C  Gastroenterology  [facility]

## 2021-08-11 ENCOUNTER — HOSPITAL ENCOUNTER (OUTPATIENT)
Dept: OCCUPATIONAL THERAPY | Facility: CLINIC | Age: 31
Setting detail: THERAPIES SERIES
End: 2021-08-11
Attending: PSYCHIATRY & NEUROLOGY
Payer: COMMERCIAL

## 2021-08-11 PROCEDURE — 97535 SELF CARE MNGMENT TRAINING: CPT | Mod: GO

## 2021-08-18 ENCOUNTER — HOSPITAL ENCOUNTER (OUTPATIENT)
Dept: OCCUPATIONAL THERAPY | Facility: CLINIC | Age: 31
Setting detail: THERAPIES SERIES
End: 2021-08-18
Attending: PSYCHIATRY & NEUROLOGY
Payer: COMMERCIAL

## 2021-08-18 PROCEDURE — 97535 SELF CARE MNGMENT TRAINING: CPT | Mod: GO

## 2021-08-25 ENCOUNTER — HOSPITAL ENCOUNTER (OUTPATIENT)
Dept: OCCUPATIONAL THERAPY | Facility: CLINIC | Age: 31
Setting detail: THERAPIES SERIES
End: 2021-08-25
Attending: PSYCHIATRY & NEUROLOGY
Payer: COMMERCIAL

## 2021-08-25 PROCEDURE — 97535 SELF CARE MNGMENT TRAINING: CPT | Mod: GO

## 2021-08-25 NOTE — PROGRESS NOTES
Outpatient Occupational Therapy Discharge Note     Patient: Kelley Serra  : 1990    Beginning/End Dates of Reporting Period:  2021 to 2021    Referring Provider: Alexx Still DO Therapy Diagnosis: Subjective cognitive impairment; Impaired IND and safety with ADLs/IADLs    Client Self Report: Patient reports significant improvement with thinking abilities, including speed of cognitive processing, numerical reasoning, complex problem solving, and attention to detail. Is continuing to complete HEP cognitive exercises, including Elevate bird, which she reports she enjoys and feels is helping her. With Elevate, particularly, she has noticed she has completed division problems in her head and is getting increasingly faster with solving problems. Pt is in agreement with today's discharge from OP OT and will follow-up with OTR with any questions/concerns in the near future prn.    Objective Measurements:      Objective Measure: CAM   Details: Re-assessment of cognitive skills, including memory, sequencing, money/math skills, foresight/planning, problem-solving, social awareness/judgement, and abstract reasoning. Pt scores with moderate impairment in visual memory and sequencing forward (4/7, same as evaluation) and moderate concrete problem solving (2/3, same as evaluation). Scores with mild impairment in visual memory and sequencing backward (4/7, same as evaluation), auditory recall/recognition (2.5/3, gets 2 of 3 words then recognizes third from list, reports she forgot word when focusing hard on visual memory section; was 3/3 at time of evaluation), auditory memory and sequencing (6/7, improved from 5/7), auditory memory and sequencing (4/7, was 5/7 at evaluation), and abstract reasoning (5/6, improved from 4/6 at evaluation). Scored with no impairment in auditory and motor immediate memory (improved from 1/2 on motor memory at evaluation), motor recall recognition (improved  from 2.5/3 at evaluation), mental manipulation (improved from 6/7 at evaluation), multiple digit math (improved from 1/4 at evaluation), mental flexibility (same as evaluation), and complex concrete problem-solving (improved from 1/3 at evaluation). Pt with significant improvement in areas of numerical reasoning, mental manipulation of information, abstract reasoning/cognitive flexibility, and complex problem-solving/attention to detail. Pt also with increased speed of cognitive processing noted with 6 min 30 sec completion time for complex problem solving paragraph compared to double the time required upon initial testing. Also greater attention to detail noted with pt getting 9/9 tasks all in order with complex problem solving (was 8/9 with 1 out of order at evaluation).          Goals:     Goal Identifier Memory Compensation for ADLs/IADLs   Goal Description Patient will demonstrate improved memory skills by completing short-term memory tasks in occupational therapy with 90% accuracy using compensatory strategies for increased safety and independence with ADL/IADLS (keeping up schedule, remembering to take medications, turn off the stove when done, remember to switch laundry).   Target Date 09/22/21   Date Met  08/11/21   Progress (detail required for progress note): Goal met. On 8/11/2021, pt recalled speed card game with 100% accuracy and had previously demonstrated teach back with 90+%. Pt educated in compensatory strategies for memory and continues to follow through with HEP for cognitive stimulation.     Goal Identifier Problem-Solving for Higher Level ADLs/IADLs   Goal Description Pt will demonstrate the ability to complete moderate to complex tasks requiring numerical reasoning and problem-solving skills with 90% accuracy to complete self-care and home tasks safely and accurately (e.g. meal preparation, financial management, medication management, work).   Target Date 09/22/21   Date Met  08/25/21    Progress (detail required for progress note): Goal met. Pt demonstrating, on average, % accuracy on increasingly difficult numerical reasoning/problem-solving tasks between sessions. On 8/25/21, pt completed complex concrete problem-solving on CAM with 100% accuracy and improved speed of completion.     Goal Identifier Divided Attention for Higher Level ADLs/IADLs   Goal Description Pt to be able to alternate between 2-3 different tasks ( eg bill paying, word puzzle and making phone calls) x 15 minutes with 90% accuracy on all tasks, to stimulate return to higher level work, cooking, ability to grocery shop, and maneuver with passengers in the community.   Target Date 09/22/21   Date Met  08/18/21   Progress (detail required for progress note): Goal met. Pt demonstrating ability to divide attention between multiple tasks and maintain 90-95% accuracy with all tasks when completing [employer] activity, demonstrating skills needed for higher level ADLs/IADLs and work.     Goal Identifier Energy Conservation   Goal Description Pt will demonstrate 3 energy conservation strategies (e.g. pacing, planning, prioritizing, sleep hygiene, etc.) to facilitate fatigue management with ADL/IADL tasks (e.g. laundry, meal preparation, walking/exercise).   Target Date 09/22/21   Date Met  08/11/21   Progress (detail required for progress note): Goal met. Pt reporting improved energy with implementation and adherence to cognitive HEP. Brief education and handout provided regarding Tips to Energy Conservation - pt with no further questions or concerns on this topic.       Plan:  Discharge from therapy.    Discharge:    Reason for Discharge: Patient has met all goals over the course of 7 total OP OT visits. See CAM results in objective measures above regarding progress on cognitive measures. Pt demonstrating overall improvement in areas of numerical reasoning, mental manipulation of information, abstract reasoning/cognitive  flexibility, and complex problem-solving/attention to detail, as well as divided attention and memory strategies between time of evaluation to discharge.    Equipment Issued: n/a    Discharge Plan: Patient to continue home program (handout provided) and follow up with OTR prn with any questions or concerns.    Sravanthi Jones, OTR/L

## 2021-08-31 NOTE — TELEPHONE ENCOUNTER
Patient messaged through [employer] to schedule follow up appointment.     Babs Tran RN 8/31/2021 at 11:07 AM

## 2021-09-01 DIAGNOSIS — Z11.59 ENCOUNTER FOR SCREENING FOR OTHER VIRAL DISEASES: ICD-10-CM

## 2021-09-08 ENCOUNTER — OFFICE VISIT (OUTPATIENT)
Dept: FAMILY MEDICINE | Facility: CLINIC | Age: 31
End: 2021-09-08
Payer: COMMERCIAL

## 2021-09-08 VITALS
WEIGHT: 123.4 LBS | HEART RATE: 81 BPM | OXYGEN SATURATION: 100 % | TEMPERATURE: 98.5 F | RESPIRATION RATE: 20 BRPM | BODY MASS INDEX: 21.07 KG/M2 | DIASTOLIC BLOOD PRESSURE: 74 MMHG | HEIGHT: 64 IN | SYSTOLIC BLOOD PRESSURE: 114 MMHG

## 2021-09-08 DIAGNOSIS — R61 NIGHT SWEATS: ICD-10-CM

## 2021-09-08 DIAGNOSIS — E55.9 VITAMIN D DEFICIENCY: ICD-10-CM

## 2021-09-08 DIAGNOSIS — E03.9 ACQUIRED HYPOTHYROIDISM: ICD-10-CM

## 2021-09-08 DIAGNOSIS — F43.22 ADJUSTMENT DISORDER WITH ANXIETY: Primary | ICD-10-CM

## 2021-09-08 LAB — T3FREE SERPL-MCNC: 3.1 PG/ML (ref 2.3–4.2)

## 2021-09-08 PROCEDURE — 84481 FREE ASSAY (FT-3): CPT | Performed by: FAMILY MEDICINE

## 2021-09-08 PROCEDURE — 84439 ASSAY OF FREE THYROXINE: CPT | Performed by: FAMILY MEDICINE

## 2021-09-08 PROCEDURE — 84443 ASSAY THYROID STIM HORMONE: CPT | Performed by: FAMILY MEDICINE

## 2021-09-08 PROCEDURE — 82306 VITAMIN D 25 HYDROXY: CPT | Performed by: FAMILY MEDICINE

## 2021-09-08 PROCEDURE — 36415 COLL VENOUS BLD VENIPUNCTURE: CPT | Performed by: FAMILY MEDICINE

## 2021-09-08 PROCEDURE — 99214 OFFICE O/P EST MOD 30 MIN: CPT | Performed by: FAMILY MEDICINE

## 2021-09-08 RX ORDER — VENLAFAXINE HYDROCHLORIDE 37.5 MG/1
37.5 CAPSULE, EXTENDED RELEASE ORAL DAILY
Qty: 7 CAPSULE | Refills: 0 | Status: SHIPPED | OUTPATIENT
Start: 2021-09-08 | End: 2021-10-08

## 2021-09-08 ASSESSMENT — ANXIETY QUESTIONNAIRES
6. BECOMING EASILY ANNOYED OR IRRITABLE: NOT AT ALL
IF YOU CHECKED OFF ANY PROBLEMS ON THIS QUESTIONNAIRE, HOW DIFFICULT HAVE THESE PROBLEMS MADE IT FOR YOU TO DO YOUR WORK, TAKE CARE OF THINGS AT HOME, OR GET ALONG WITH OTHER PEOPLE: NOT DIFFICULT AT ALL
3. WORRYING TOO MUCH ABOUT DIFFERENT THINGS: SEVERAL DAYS
7. FEELING AFRAID AS IF SOMETHING AWFUL MIGHT HAPPEN: NOT AT ALL
1. FEELING NERVOUS, ANXIOUS, OR ON EDGE: NOT AT ALL
5. BEING SO RESTLESS THAT IT IS HARD TO SIT STILL: NOT AT ALL
GAD7 TOTAL SCORE: 1
2. NOT BEING ABLE TO STOP OR CONTROL WORRYING: NOT AT ALL

## 2021-09-08 ASSESSMENT — PATIENT HEALTH QUESTIONNAIRE - PHQ9
SUM OF ALL RESPONSES TO PHQ QUESTIONS 1-9: 6
5. POOR APPETITE OR OVEREATING: NOT AT ALL

## 2021-09-08 ASSESSMENT — MIFFLIN-ST. JEOR: SCORE: 1241.8

## 2021-09-08 NOTE — PROGRESS NOTES
Assessment & Plan     Adjustment disorder with anxiety  We discussed tapering down of effexor to 37.5 for a week then start the prozac 20 mg daily   - venlafaxine (EFFEXOR-XR) 37.5 MG 24 hr capsule; Take 1 capsule (37.5 mg) by mouth daily  - FLUoxetine (PROZAC) 20 MG capsule; Take 1 capsule (20 mg) by mouth daily After stopping the venlaxafine 37.5 mg start the fluoxetine 20 mg daily  Will follow up in 3 to 4 weeks sooner if any concerns   Night sweats  As above     Acquired hypothyroidism  Will check her thyroid , pt has seen an endocrinologist in the past   - TSH with free T4 reflex; Future  - T3, Free; Future  - T4, free; Future  - T3, Free  - T4, free  - TSH    Vitamin D deficiency  Will check vitamin D levels   - Vitamin D Deficiency; Future  - Vitamin D Deficiency          RTC if no improving or worsening.  Pt is aware  and comfortable with the current plan.      Ariadne Jimenez MD  [facility]    Flori Moore is a 31 year old who presents for the following health issues     HPI     Medication Followup of venlafaxine XR 75 mg    Taking Medication as prescribed: yes    Side Effects:  Night sweats, sexual side effects    Medication Helping Symptoms:  Yes    Pt would like to discuss alternative to venlafaxine d/t SEs, also requesting thyroid/hormone labs.   Wakes up in sweats , this has been going on for about a month , 37. 5 mg of the Effexor low dose not so bad about a month ago when moving to the 75 mg she started to have the night sweats , night worse but in general sweats and is hot all day , she takes the Effexor at 9 am   Hx of prozac use and no side effects when on that   Anxiety Follow-Up- as above     How are you doing with your anxiety since your last visit? Improved     Are you having other symptoms that might be associated with anxiety? No    Have you had a significant life event? No     Are you feeling depressed? No    Do you have any concerns with your use of alcohol or  other drugs? No    Social History     Tobacco Use     Smoking status: Never Smoker     Smokeless tobacco: Never Used   Vaping Use     Vaping Use: Never used   Substance Use Topics     Alcohol use: Yes     Comment: Socially     Drug use: No     JOHN PAUL-7 SCORE 6/18/2021 6/23/2021 7/21/2021   Total Score - 6 (mild anxiety) 6 (mild anxiety)   Total Score 1 6 6     PHQ 6/18/2021 6/23/2021 7/21/2021   PHQ-9 Total Score 4 7 7   Q9: Thoughts of better off dead/self-harm past 2 weeks Not at all Not at all Not at all     Last PHQ-9 9/8/2021   1.  Little interest or pleasure in doing things 0   2.  Feeling down, depressed, or hopeless 0   3.  Trouble falling or staying asleep, or sleeping too much 3   4.  Feeling tired or having little energy 1   5.  Poor appetite or overeating 0   6.  Feeling bad about yourself 0   7.  Trouble concentrating 2   8.  Moving slowly or restless 0   Q9: Thoughts of better off dead/self-harm past 2 weeks 0   PHQ-9 Total Score 6   Difficulty at work, home, or with people Somewhat difficult     JOHN PAUL-7  9/8/2021   1. Feeling nervous, anxious, or on edge 0   2. Not being able to stop or control worrying 0   3. Worrying too much about different things 1   4. Trouble relaxing 0   5. Being so restless that it is hard to sit still 0   6. Becoming easily annoyed or irritable 0   7. Feeling afraid, as if something awful might happen 0   JOHN PAUL-7 Total Score 1   If you checked any problems, how difficult have they made it for you to do your work, take care of things at home, or get along with other people? Not difficult at all         How many servings of fruits and vegetables do you eat daily?  4 or more    On average, how many sweetened beverages do you drink each day (Examples: soda, juice, sweet tea, etc.  Do NOT count diet or artificially sweetened beverages)?   0    How many days per week do you exercise enough to make your heart beat faster? 5    How many minutes a day do you exercise enough to make your  "heart beat faster? 30 - 60    How many days per week do you miss taking your medication? 0           Review of Systems   Constitutional, HEENT, cardiovascular, pulmonary, GI, , musculoskeletal, neuro, skin, endocrine and psych systems are negative, except as otherwise noted.      Objective    /74   Pulse 81   Temp 98.5  F (36.9  C)   Resp 20   Ht 1.613 m (5' 3.5\")   Wt 56 kg (123 lb 6.4 oz)   LMP 08/25/2021 (Approximate)   SpO2 100%   BMI 21.52 kg/m    Body mass index is 21.52 kg/m .  Physical Exam   GENERAL: healthy, alert and no distress  EYES: Eyes grossly normal to inspection, PERRL and conjunctivae and sclerae normal  NECK: no adenopathy, no asymmetry, masses, or scars and thyroid normal to palpation  RESP: lungs clear to auscultation - no rales, rhonchi or wheezes  CV: regular rate and rhythm, normal S1 S2, no S3 or S4, no murmur, click or rub, no peripheral edema and peripheral pulses strong  MS: no gross musculoskeletal defects noted, no edema  NEURO: Normal strength and tone, mentation intact and speech normal    Results for orders placed or performed in visit on 09/08/21   T3, Free     Status: Normal   Result Value Ref Range    T3 Free 3.1 2.3 - 4.2 pg/mL   T4, free     Status: Normal   Result Value Ref Range    Free T4 1.16 0.76 - 1.46 ng/dL   Vitamin D Deficiency     Status: Normal   Result Value Ref Range    Vitamin D, Total (25-Hydroxy) 33 20 - 75 ug/L    Narrative    Season, race, dietary intake, and treatment affect the concentration of 25-hydroxy-Vitamin D. Values may decrease during winter months and increase during summer months. Values 20-29 ug/L may indicate Vitamin D insufficiency and values <20 ug/L may indicate Vitamin D deficiency.    Vitamin D determination is routinely performed by an immunoassay specific for 25 hydroxyvitamin D3.  If an individual is on vitamin D2(ergocalciferol) supplementation, please specify 25 OH vitamin D2 and D3 level determination by LCMSMS test " [id number].     TSH     Status: Abnormal   Result Value Ref Range    TSH 0.01 (L) 0.40 - 4.00 mU/L

## 2021-09-09 LAB
DEPRECATED CALCIDIOL+CALCIFEROL SERPL-MC: 33 UG/L (ref 20–75)
T4 FREE SERPL-MCNC: 1.16 NG/DL (ref 0.76–1.46)
TSH SERPL DL<=0.005 MIU/L-ACNC: 0.01 MU/L (ref 0.4–4)

## 2021-09-09 ASSESSMENT — ANXIETY QUESTIONNAIRES: GAD7 TOTAL SCORE: 1

## 2021-09-12 ENCOUNTER — HEALTH MAINTENANCE LETTER (OUTPATIENT)
Age: 31
End: 2021-09-12

## 2021-09-13 ENCOUNTER — TELEPHONE (OUTPATIENT)
Dept: ENDOCRINOLOGY | Facility: CLINIC | Age: 31
End: 2021-09-13

## 2021-09-13 DIAGNOSIS — E06.3 HASHIMOTO'S THYROIDITIS: Primary | ICD-10-CM

## 2021-09-13 PROBLEM — E03.9 ACQUIRED HYPOTHYROIDISM: Status: ACTIVE | Noted: 2021-09-13

## 2021-09-13 PROBLEM — F43.22 ADJUSTMENT DISORDER WITH ANXIETY: Status: ACTIVE | Noted: 2021-09-13

## 2021-09-13 PROBLEM — R61 NIGHT SWEATS: Status: ACTIVE | Noted: 2021-09-13

## 2021-09-13 RX ORDER — LEVOTHYROXINE SODIUM 75 UG/1
37.5 TABLET ORAL DAILY
Qty: 15 TABLET | Refills: 11 | Status: SHIPPED | OUTPATIENT
Start: 2021-09-13 | End: 2022-09-09

## 2021-09-13 NOTE — TELEPHONE ENCOUNTER
Pt sent the following message in a request to schedule appt.    To: [facility] Reception Pool   Subject: RE: Appointment Request                            Thank you! A video visit on 10/20 will work for me. Would it be possible to ask Dr. Mcpherson if I should reduce my levothyroxine dose? I'm currently on 50 mcg daily. I'm a little concerned since my TSH is so low and I don't know if I should continue the current dose for another month and a half. I have been sleeping very poorly over the past month and don't know if the low TSH is responsible for that.       Thank you!      Oscar Serra     Please advise.  Pooja PAIGE RN BSN PHN  Specialty Clinics

## 2021-10-07 ASSESSMENT — ANXIETY QUESTIONNAIRES
5. BEING SO RESTLESS THAT IT IS HARD TO SIT STILL: NOT AT ALL
7. FEELING AFRAID AS IF SOMETHING AWFUL MIGHT HAPPEN: NOT AT ALL
1. FEELING NERVOUS, ANXIOUS, OR ON EDGE: SEVERAL DAYS
2. NOT BEING ABLE TO STOP OR CONTROL WORRYING: NOT AT ALL
3. WORRYING TOO MUCH ABOUT DIFFERENT THINGS: NOT AT ALL
GAD7 TOTAL SCORE: 1
8. IF YOU CHECKED OFF ANY PROBLEMS, HOW DIFFICULT HAVE THESE MADE IT FOR YOU TO DO YOUR WORK, TAKE CARE OF THINGS AT HOME, OR GET ALONG WITH OTHER PEOPLE?: NOT DIFFICULT AT ALL
4. TROUBLE RELAXING: NOT AT ALL
7. FEELING AFRAID AS IF SOMETHING AWFUL MIGHT HAPPEN: NOT AT ALL
6. BECOMING EASILY ANNOYED OR IRRITABLE: NOT AT ALL

## 2021-10-07 ASSESSMENT — PATIENT HEALTH QUESTIONNAIRE - PHQ9
SUM OF ALL RESPONSES TO PHQ QUESTIONS 1-9: 2
SUM OF ALL RESPONSES TO PHQ QUESTIONS 1-9: 2
10. IF YOU CHECKED OFF ANY PROBLEMS, HOW DIFFICULT HAVE THESE PROBLEMS MADE IT FOR YOU TO DO YOUR WORK, TAKE CARE OF THINGS AT HOME, OR GET ALONG WITH OTHER PEOPLE: SOMEWHAT DIFFICULT

## 2021-10-07 NOTE — PROGRESS NOTES
Assessment & Plan     Adjustment disorder with anxiety  Transition from Effexor 37.5 mg to the 20 mg of Prozac daily and has been on this dose for three weeks now , we discussed staying on the same dose for another month and then will follow up again , her anxiety is better , has some fatigue - see below   - EMOTIONAL / BEHAVIORAL ASSESSMENT  - FLUoxetine (PROZAC) 20 MG capsule; Take 1 capsule (20 mg) by mouth daily After stopping the venlaxafine 37.5 mg start the fluoxetine 20 mg daily    Dry eyes  Will check for possible Sjogrens per her ophthalmologist recommendation and will follow up after the results are back   - SSA Ro NILO Antibody IgG; Future  - SSB La NILO Antibody IgG; Future  - SSA Ro NILO Antibody IgG  - SSB La NILO Antibody IgG    Fatigue, unspecified type  She has seen eye specialist and has dry eyes , is given some eye drops for this and we are doing a work up for Sjogrens   - SSA Ro NILO Antibody IgG; Future  - SSB La NILO Antibody IgG; Future  - ESR: Erythrocyte sedimentation rate; Future  - CBC with platelets; Future  - SSA Ro NILO Antibody IgG  - SSB La NILO Antibody IgG  - ESR: Erythrocyte sedimentation rate  - CBC with platelets    Hashimoto's thyroiditis  Per endocrinologist , they changed the dose of the synthroid over a month ago   - TSH with free T4 reflex  RTC if no improving or worsening.  Pt is aware  and comfortable with the current plan.  Will follow up in a month on the anxiety     Ariadne Jimenez MD  [facility]    Flori Moore is a 31 year old who presents for the following health issues     HPI   Still fatigued and some night sweats , she has severe dry eyes , saw eye specialist and he thought she would need the work up for possible Sjogrens   Eye doc Dr Landin at [facility] , has told her she has severe dry eyes- now she is usingThera tears 6x a day for dry eye and if not better will try the Restasis   About three weeks ago went down on the synthroid  from 50 mcg to 37.5 mcg and she has been on this dose as well as the Prozac 20 mg since then too   Anxiety Follow-Up    How are you doing with your anxiety since your last visit? Improved     Are you having other symptoms that might be associated with anxiety? Yes:  fatigue    Have you had a significant life event? No     Are you feeling depressed? No    Do you have any concerns with your use of alcohol or other drugs? No    Social History     Tobacco Use     Smoking status: Never Smoker     Smokeless tobacco: Never Used   Vaping Use     Vaping Use: Never used   Substance Use Topics     Alcohol use: Yes     Comment: Socially     Drug use: No     JOHN PAUL-7 SCORE 6/23/2021 7/21/2021 9/8/2021   Total Score 6 (mild anxiety) 6 (mild anxiety) -   Total Score 6 6 1     PHQ 6/23/2021 7/21/2021 9/8/2021   PHQ-9 Total Score 7 7 6   Q9: Thoughts of better off dead/self-harm past 2 weeks Not at all Not at all Not at all     Last PHQ-9 10/7/2021   1.  Little interest or pleasure in doing things 0   2.  Feeling down, depressed, or hopeless 0   3.  Trouble falling or staying asleep, or sleeping too much 0   4.  Feeling tired or having little energy 1   5.  Poor appetite or overeating 0   6.  Feeling bad about yourself 0   7.  Trouble concentrating 1   8.  Moving slowly or restless 0   Q9: Thoughts of better off dead/self-harm past 2 weeks 0   PHQ-9 Total Score 2   Difficulty at work, home, or with people -     JOHN PAUL-7  10/7/2021   1. Feeling nervous, anxious, or on edge 1   2. Not being able to stop or control worrying 0   3. Worrying too much about different things 0   4. Trouble relaxing 0   5. Being so restless that it is hard to sit still 0   6. Becoming easily annoyed or irritable 0   7. Feeling afraid, as if something awful might happen 0   JOHN PAUL-7 Total Score 1   If you checked any problems, how difficult have they made it for you to do your work, take care of things at home, or get along with other people? -       Hypothyroidism  Follow-up      Since last visit, patient describes the following symptoms: Weight stable, no hair loss, no skin changes, no constipation, no loose stools      How many servings of fruits and vegetables do you eat daily?  4 or more    On average, how many sweetened beverages do you drink each day (Examples: soda, juice, sweet tea, etc.  Do NOT count diet or artificially sweetened beverages)?   0    How many days per week do you exercise enough to make your heart beat faster? 7    How many minutes a day do you exercise enough to make your heart beat faster? 30 - 60    How many days per week do you miss taking your medication? 0          Review of Systems   Constitutional, HEENT, cardiovascular, pulmonary, GI, , musculoskeletal, neuro, skin, endocrine and psych systems are negative, except as otherwise noted.      Objective    There were no vitals taken for this visit.  There is no height or weight on file to calculate BMI.  Physical Exam   GENERAL: healthy, alert and no distress  EYES: Eyes grossly normal to inspection, PERRL and conjunctivae and sclerae normal  NECK: no adenopathy, no asymmetry, masses, or scars and thyroid normal to palpation  RESP: lungs clear to auscultation - no rales, rhonchi or wheezes  CV: regular rate and rhythm, normal S1 S2, no S3 or S4, no murmur, click or rub, no peripheral edema and peripheral pulses strong  ABDOMEN: soft, nontender, no hepatosplenomegaly, no masses and bowel sounds normal  MS: no gross musculoskeletal defects noted, no edema    Results for orders placed or performed in visit on 10/08/21 (from the past 24 hour(s))   ESR: Erythrocyte sedimentation rate   Result Value Ref Range    Erythrocyte Sedimentation Rate 4 0 - 20 mm/hr   CBC with platelets   Result Value Ref Range    WBC Count 6.1 4.0 - 11.0 10e3/uL    RBC Count 4.81 3.80 - 5.20 10e6/uL    Hemoglobin 13.6 11.7 - 15.7 g/dL    Hematocrit 42.4 35.0 - 47.0 %    MCV 88 78 - 100 fL    MCH 28.3 26.5 - 33.0 pg    MCHC  32.1 31.5 - 36.5 g/dL    RDW 13.3 10.0 - 15.0 %    Platelet Count 263 150 - 450 10e3/uL

## 2021-10-08 ENCOUNTER — OFFICE VISIT (OUTPATIENT)
Dept: FAMILY MEDICINE | Facility: CLINIC | Age: 31
End: 2021-10-08
Payer: COMMERCIAL

## 2021-10-08 VITALS
BODY MASS INDEX: 21.49 KG/M2 | WEIGHT: 125.9 LBS | TEMPERATURE: 97.5 F | SYSTOLIC BLOOD PRESSURE: 112 MMHG | OXYGEN SATURATION: 100 % | DIASTOLIC BLOOD PRESSURE: 73 MMHG | HEART RATE: 70 BPM | HEIGHT: 64 IN

## 2021-10-08 DIAGNOSIS — E06.3 HASHIMOTO'S THYROIDITIS: ICD-10-CM

## 2021-10-08 DIAGNOSIS — R53.83 FATIGUE, UNSPECIFIED TYPE: ICD-10-CM

## 2021-10-08 DIAGNOSIS — H04.123 DRY EYES: ICD-10-CM

## 2021-10-08 DIAGNOSIS — F43.22 ADJUSTMENT DISORDER WITH ANXIETY: Primary | ICD-10-CM

## 2021-10-08 LAB
ERYTHROCYTE [DISTWIDTH] IN BLOOD BY AUTOMATED COUNT: 13.3 % (ref 10–15)
ERYTHROCYTE [SEDIMENTATION RATE] IN BLOOD BY WESTERGREN METHOD: 4 MM/HR (ref 0–20)
HCT VFR BLD AUTO: 42.4 % (ref 35–47)
HGB BLD-MCNC: 13.6 G/DL (ref 11.7–15.7)
MCH RBC QN AUTO: 28.3 PG (ref 26.5–33)
MCHC RBC AUTO-ENTMCNC: 32.1 G/DL (ref 31.5–36.5)
MCV RBC AUTO: 88 FL (ref 78–100)
PLATELET # BLD AUTO: 263 10E3/UL (ref 150–450)
RBC # BLD AUTO: 4.81 10E6/UL (ref 3.8–5.2)
WBC # BLD AUTO: 6.1 10E3/UL (ref 4–11)

## 2021-10-08 PROCEDURE — 96127 BRIEF EMOTIONAL/BEHAV ASSMT: CPT | Performed by: FAMILY MEDICINE

## 2021-10-08 PROCEDURE — 84443 ASSAY THYROID STIM HORMONE: CPT | Performed by: FAMILY MEDICINE

## 2021-10-08 PROCEDURE — 36415 COLL VENOUS BLD VENIPUNCTURE: CPT | Performed by: FAMILY MEDICINE

## 2021-10-08 PROCEDURE — 86235 NUCLEAR ANTIGEN ANTIBODY: CPT | Mod: 59 | Performed by: FAMILY MEDICINE

## 2021-10-08 PROCEDURE — 86235 NUCLEAR ANTIGEN ANTIBODY: CPT | Performed by: FAMILY MEDICINE

## 2021-10-08 PROCEDURE — 99214 OFFICE O/P EST MOD 30 MIN: CPT | Performed by: FAMILY MEDICINE

## 2021-10-08 PROCEDURE — 85027 COMPLETE CBC AUTOMATED: CPT | Performed by: FAMILY MEDICINE

## 2021-10-08 PROCEDURE — 85652 RBC SED RATE AUTOMATED: CPT | Performed by: FAMILY MEDICINE

## 2021-10-08 ASSESSMENT — PATIENT HEALTH QUESTIONNAIRE - PHQ9: SUM OF ALL RESPONSES TO PHQ QUESTIONS 1-9: 2

## 2021-10-08 ASSESSMENT — ANXIETY QUESTIONNAIRES: GAD7 TOTAL SCORE: 1

## 2021-10-08 ASSESSMENT — MIFFLIN-ST. JEOR: SCORE: 1253.21

## 2021-10-11 LAB — TSH SERPL DL<=0.005 MIU/L-ACNC: 1.13 MU/L (ref 0.4–4)

## 2021-10-12 LAB
ENA SS-A AB SER IA-ACNC: <0.5 U/ML
ENA SS-A AB SER IA-ACNC: NEGATIVE
ENA SS-B IGG SER IA-ACNC: <0.6 U/ML
ENA SS-B IGG SER IA-ACNC: NEGATIVE

## 2021-10-19 PROBLEM — F32.9 MAJOR DEPRESSION: Status: RESOLVED | Noted: 2018-09-17 | Resolved: 2020-10-06

## 2021-10-19 ASSESSMENT — ENCOUNTER SYMPTOMS
FATIGUE: 1
HALLUCINATIONS: 0
INCREASED ENERGY: 0
DOUBLE VISION: 0
EYE REDNESS: 0
WEIGHT GAIN: 0
ALTERED TEMPERATURE REGULATION: 0
DECREASED APPETITE: 0
EYE IRRITATION: 0
WEIGHT LOSS: 0
EYE WATERING: 0
EYE PAIN: 0
CHILLS: 0
POLYPHAGIA: 0
NIGHT SWEATS: 1
FEVER: 0
POLYDIPSIA: 0

## 2021-10-20 ENCOUNTER — VIRTUAL VISIT (OUTPATIENT)
Dept: ENDOCRINOLOGY | Facility: CLINIC | Age: 31
End: 2021-10-20
Payer: COMMERCIAL

## 2021-10-20 DIAGNOSIS — R53.83 OTHER FATIGUE: ICD-10-CM

## 2021-10-20 DIAGNOSIS — E06.3 HASHIMOTO'S THYROIDITIS: Primary | ICD-10-CM

## 2021-10-20 PROCEDURE — 99214 OFFICE O/P EST MOD 30 MIN: CPT | Mod: GT | Performed by: INTERNAL MEDICINE

## 2021-10-20 RX ORDER — LIOTHYRONINE SODIUM 5 UG/1
5 TABLET ORAL DAILY
Qty: 30 TABLET | Refills: 11 | Status: SHIPPED | OUTPATIENT
Start: 2021-10-20 | End: 2022-04-13

## 2021-10-20 NOTE — PROGRESS NOTES
"S:   Patient presents for evaluation of abnormal thyroid function tests.   She asked to have labs drawn due to concerns about \"brain fog\", dry skin, and fatigue over the last five years.   Over the last 15 months, she has noticed a \"tightness\" in her throat.     No recent changes in weight.   Sleeps 8 hours a night. She does not toss or turn.     Occasional palpitations when she is lying down.   No SOB.     Menses are regular. Notes they seeming to last longer recently.   No children. No immediate plans for children.   No dysphagia or changes in speech.     She has a history of stage 1 ovarian cancer.   She has a right oophorectomy.     In 12/2020, she is on 25 mcg of levothyroxine daily.   Notes her eyelashes are not falling out as much since starting medication.   No significant change in fatigue.     In 2/2021, taking 37.5 mcg of levothyroxine daily.   No change in fatigue.   She sleeps 8 hours a night. Not refreshed in the AM.   No snoring. No tossing and turning.   No change in weight.   Prior issues with IBS resolved with stopping dairy. Recently more GI upset.   She is a vegan. Takes B12 tablets.   No black or bloody stools.  Admits to feelings of depression but feels this is related to being upset about her health problems.   She was on prozac for ~ 2 years and did not feel it had an effect. She was not in therapy.     In 6/2021, fatigue unchanged. She was seen by Neurology recently and diagnosed with occipital neuralgia. Recommended for a lidocaine block but has not employed yet. She does not feel her HA's correlates with the fatigue. Taking 50 mcg of levothyroxine.     Her IBS seems to be better. Seeing GI later this summer to discuss possible diagnosis of SIBO.     On review, fatigue began in 2014 after her mother was diagnosed with cancer. She still worries \"a lot\" about her mother.     In 10/2021, continues on 37.5 mcg levothyroxine daily.   Since our last visit, she was started on effexor. She " developed night sweats. Changed to prozac but night sweats continue. Regular menses. No recent change in weight. Her headaches he improved since starting accupucnture.     Answers for HPI/ROS submitted by the patient on 10/19/2021  General Symptoms: Yes  Skin Symptoms: No  HENT Symptoms: No  EYE SYMPTOMS: Yes  HEART SYMPTOMS: No  LUNG SYMPTOMS: No  INTESTINAL SYMPTOMS: No  URINARY SYMPTOMS: No  GYNECOLOGIC SYMPTOMS: No  BREAST SYMPTOMS: No  SKELETAL SYMPTOMS: No  BLOOD SYMPTOMS: No  NERVOUS SYSTEM SYMPTOMS: No  MENTAL HEALTH SYMPTOMS: No  Fever: No  Loss of appetite: No  Weight loss: No  Weight gain: No  Fatigue: Yes  Night sweats: Yes  Chills: No  Increased stress: No  Excessive hunger: No  Excessive thirst: No  Feeling hot or cold when others believe the temperature is normal: No  Loss of height: No  Post-operative complications: No  Surgical site pain: No  Hallucinations: No  Change in or Loss of Energy: No  Hyperactivity: No  Confusion: No  Eye pain: No  Vision loss: No  Dry eyes: Yes  Watery eyes: No  Eye bulging: No  Double vision: No  Flashing of lights: No  Spots: No  Floaters: No  Redness: No  Crossed eyes: No  Tunnel Vision: No  Yellowing of eyes: No  Eye irritation: No      ROS: 10 point ROS neg other than the symptoms noted above in the HPI.    Exam:   GENERAL: Healthy, alert and no distress  EYES: Eyes grossly normal to inspection.  No discharge or erythema, or obvious scleral/conjunctival abnormalities.  RESP: No audible wheeze, cough, or visible cyanosis.  No visible retractions or increased work of breathing.    SKIN: Visible skin clear. No significant rash, abnormal pigmentation or lesions.  NEURO: Cranial nerves grossly intact.  Mentation and speech appropriate for age.  PSYCH: Mentation appears normal, affect normal/bright, judgement and insight intact, normal speech and appearance well-groomed.     A/P:   Abnormal thyroid function tests - Positive TPO antibody. Explained this means she will  develop hypothyroidism in the next 10 years. Extensive discussion of thyroid hormone and normal physiology. Included was discussion of thyroid in relation to weight and energy. She is not actively pursuing pregnancy. As per her neck discomfort, this could be due to thyroid enlargement from Hashimoto's thyroiditis or a nodule.     US in 10/2020 showed small lymph nodes and possibly a parathyroid adenoma.   Her calcium and iPTH were normal.    TSH improved to 2.00 with 25 mcg of levothyroxine. Hair has improved.     US in 1/2021 was normal.     TSH 1.67 on 37.5 mcg of levothyroxine every day. No change in fatigue. We spoke about alternative diagnoses: vitamin D deficiency, iron deficiency, B12 deficiency, AI. Recent normal CBC.   In 6/2021, TSH remains stable. Remains fatigued.   In 10/2021, she is asking about combination therapy with T3. Discussed how no conclusive data that combination therapy is superior to levothyroxine.   -No change to levothyroxine dose.   -Start liothyronine (T3) 5 mcg in the AM.   -Labs in 6-8 weeks.     Fatigue - Normal cosyntropin stimulation test, vitamin D, B12, CMP, and iron in 2/2021. Discussed ongoing stress issues.   In 10/2021, issue persists.   -Continue counseling and anxiety medication.   -Thyroid as above.     Start 3:36 PM   Stop 3:55 PM   [facility]   Patient at Home    Devan Mcpherson MD on 10/20/2021 at 3:56 PM

## 2021-10-20 NOTE — LETTER
"    10/20/2021         RE: eKlley Serra  [address] [id number]        Dear Colleague,    Thank you for referring your patient, Kelley Serra, to the [facility]. Please see a copy of my visit note below.    S:   Patient presents for evaluation of abnormal thyroid function tests.   She asked to have labs drawn due to concerns about \"brain fog\", dry skin, and fatigue over the last five years.   Over the last 15 months, she has noticed a \"tightness\" in her throat.     No recent changes in weight.   Sleeps 8 hours a night. She does not toss or turn.     Occasional palpitations when she is lying down.   No SOB.     Menses are regular. Notes they seeming to last longer recently.   No children. No immediate plans for children.   No dysphagia or changes in speech.     She has a history of stage 1 ovarian cancer.   She has a right oophorectomy.     In 12/2020, she is on 25 mcg of levothyroxine daily.   Notes her eyelashes are not falling out as much since starting medication.   No significant change in fatigue.     In 2/2021, taking 37.5 mcg of levothyroxine daily.   No change in fatigue.   She sleeps 8 hours a night. Not refreshed in the AM.   No snoring. No tossing and turning.   No change in weight.   Prior issues with IBS resolved with stopping dairy. Recently more GI upset.   She is a vegan. Takes B12 tablets.   No black or bloody stools.  Admits to feelings of depression but feels this is related to being upset about her health problems.   She was on prozac for ~ 2 years and did not feel it had an effect. She was not in therapy.     In 6/2021, fatigue unchanged. She was seen by Neurology recently and diagnosed with occipital neuralgia. Recommended for a lidocaine block but has not employed yet. She does not feel her HA's correlates with the fatigue. Taking 50 mcg of levothyroxine.     Her IBS seems to be better. Seeing GI later this summer to discuss possible diagnosis " "of SIBO.     On review, fatigue began in 2014 after her mother was diagnosed with cancer. She still worries \"a lot\" about her mother.     In 10/2021, continues on 37.5 mcg levothyroxine daily.   Since our last visit, she was started on effexor. She developed night sweats. Changed to prozac but night sweats continue. Regular menses. No recent change in weight. Her headaches he improved since starting accupucnture.     Answers for HPI/ROS submitted by the patient on 10/19/2021  General Symptoms: Yes  Skin Symptoms: No  HENT Symptoms: No  EYE SYMPTOMS: Yes  HEART SYMPTOMS: No  LUNG SYMPTOMS: No  INTESTINAL SYMPTOMS: No  URINARY SYMPTOMS: No  GYNECOLOGIC SYMPTOMS: No  BREAST SYMPTOMS: No  SKELETAL SYMPTOMS: No  BLOOD SYMPTOMS: No  NERVOUS SYSTEM SYMPTOMS: No  MENTAL HEALTH SYMPTOMS: No  Fever: No  Loss of appetite: No  Weight loss: No  Weight gain: No  Fatigue: Yes  Night sweats: Yes  Chills: No  Increased stress: No  Excessive hunger: No  Excessive thirst: No  Feeling hot or cold when others believe the temperature is normal: No  Loss of height: No  Post-operative complications: No  Surgical site pain: No  Hallucinations: No  Change in or Loss of Energy: No  Hyperactivity: No  Confusion: No  Eye pain: No  Vision loss: No  Dry eyes: Yes  Watery eyes: No  Eye bulging: No  Double vision: No  Flashing of lights: No  Spots: No  Floaters: No  Redness: No  Crossed eyes: No  Tunnel Vision: No  Yellowing of eyes: No  Eye irritation: No      ROS: 10 point ROS neg other than the symptoms noted above in the HPI.    Exam:   GENERAL: Healthy, alert and no distress  EYES: Eyes grossly normal to inspection.  No discharge or erythema, or obvious scleral/conjunctival abnormalities.  RESP: No audible wheeze, cough, or visible cyanosis.  No visible retractions or increased work of breathing.    SKIN: Visible skin clear. No significant rash, abnormal pigmentation or lesions.  NEURO: Cranial nerves grossly intact.  Mentation and speech " appropriate for age.  PSYCH: Mentation appears normal, affect normal/bright, judgement and insight intact, normal speech and appearance well-groomed.     A/P:   Abnormal thyroid function tests - Positive TPO antibody. Explained this means she will develop hypothyroidism in the next 10 years. Extensive discussion of thyroid hormone and normal physiology. Included was discussion of thyroid in relation to weight and energy. She is not actively pursuing pregnancy. As per her neck discomfort, this could be due to thyroid enlargement from Hashimoto's thyroiditis or a nodule.     US in 10/2020 showed small lymph nodes and possibly a parathyroid adenoma.   Her calcium and iPTH were normal.    TSH improved to 2.00 with 25 mcg of levothyroxine. Hair has improved.     US in 1/2021 was normal.     TSH 1.67 on 37.5 mcg of levothyroxine every day. No change in fatigue. We spoke about alternative diagnoses: vitamin D deficiency, iron deficiency, B12 deficiency, AI. Recent normal CBC.   In 6/2021, TSH remains stable. Remains fatigued.   In 10/2021, she is asking about combination therapy with T3. Discussed how no conclusive data that combination therapy is superior to levothyroxine.   -No change to levothyroxine dose.   -Start liothyronine (T3) 5 mcg in the AM.   -Labs in 6-8 weeks.     Fatigue - Normal cosyntropin stimulation test, vitamin D, B12, CMP, and iron in 2/2021. Discussed ongoing stress issues.   In 10/2021, issue persists.   -Continue counseling and anxiety medication.   -Thyroid as above.     Start 3:36 PM   Stop 3:55 PM   [facility]   Patient at Home    Devan Mcpherson MD on 10/20/2021 at 3:56 PM        Again, thank you for allowing me to participate in the care of your patient.        Sincerely,        Devan Mcpherson MD

## 2021-10-29 ENCOUNTER — LAB (OUTPATIENT)
Dept: LAB | Facility: CLINIC | Age: 31
End: 2021-10-29
Attending: OBSTETRICS & GYNECOLOGY
Payer: COMMERCIAL

## 2021-10-29 ENCOUNTER — ANCILLARY PROCEDURE (OUTPATIENT)
Dept: ULTRASOUND IMAGING | Facility: CLINIC | Age: 31
End: 2021-10-29
Attending: OBSTETRICS & GYNECOLOGY
Payer: COMMERCIAL

## 2021-10-29 DIAGNOSIS — D39.11 OVARIAN TUMOR OF BORDERLINE MALIGNANCY, RIGHT: ICD-10-CM

## 2021-10-29 DIAGNOSIS — E06.3 HASHIMOTO'S THYROIDITIS: Primary | ICD-10-CM

## 2021-10-29 DIAGNOSIS — D39.11 OVARIAN TUMOR OF BORDERLINE MALIGNANCY, RIGHT: Primary | ICD-10-CM

## 2021-10-29 LAB — CANCER AG125 SERPL-ACNC: 10 U/ML (ref 0–30)

## 2021-10-29 PROCEDURE — 36415 COLL VENOUS BLD VENIPUNCTURE: CPT

## 2021-10-29 PROCEDURE — 76856 US EXAM PELVIC COMPLETE: CPT | Mod: GC | Performed by: RADIOLOGY

## 2021-10-29 PROCEDURE — 86304 IMMUNOASSAY TUMOR CA 125: CPT

## 2021-10-29 PROCEDURE — 76830 TRANSVAGINAL US NON-OB: CPT | Mod: GC | Performed by: RADIOLOGY

## 2021-10-29 NOTE — NURSING NOTE
Chief Complaint   Patient presents with     Blood Draw     Labs drawn via  by rn in lab.      Labs collected from venipuncture by RN.     Dominick Keys RN

## 2021-11-17 ASSESSMENT — ENCOUNTER SYMPTOMS
HOT FLASHES: 0
HALLUCINATIONS: 0
EYE REDNESS: 0
WEIGHT LOSS: 0
EYE IRRITATION: 0
DIARRHEA: 0
NAUSEA: 0
EYE PAIN: 0
FEVER: 0
JAUNDICE: 0
INCREASED ENERGY: 0
DECREASED APPETITE: 0
EYE WATERING: 0
BLOOD IN STOOL: 0
FATIGUE: 1
RECTAL PAIN: 0
POLYDIPSIA: 0
CHILLS: 0
DECREASED LIBIDO: 0
ALTERED TEMPERATURE REGULATION: 0
DOUBLE VISION: 0
ABDOMINAL PAIN: 1
VOMITING: 0
BOWEL INCONTINENCE: 0
BLOATING: 0
HEARTBURN: 1
NIGHT SWEATS: 1
WEIGHT GAIN: 0
POLYPHAGIA: 0
CONSTIPATION: 0

## 2021-11-21 ASSESSMENT — ENCOUNTER SYMPTOMS
SPEECH CHANGE: 0
SMELL DISTURBANCE: 0
STIFFNESS: 0
TINGLING: 0
DEPRESSION: 0
LOSS OF CONSCIOUSNESS: 0
TASTE DISTURBANCE: 0
TROUBLE SWALLOWING: 0
MYALGIAS: 0
MUSCLE WEAKNESS: 0
SPUTUM PRODUCTION: 0
HEMATURIA: 0
ABDOMINAL PAIN: 0
SORE THROAT: 0
HYPERTENSION: 0
HYPOTENSION: 0
NECK MASS: 0
DECREASED LIBIDO: 0
MEMORY LOSS: 0
POLYDIPSIA: 0
HOARSE VOICE: 0
NAUSEA: 0
DECREASED CONCENTRATION: 0
WEIGHT GAIN: 0
NIGHT SWEATS: 0
PANIC: 0
PALPITATIONS: 0
NERVOUS/ANXIOUS: 0
EYE PAIN: 0
WEIGHT LOSS: 0
DOUBLE VISION: 0
CLAUDICATION: 0
SKIN CHANGES: 0
BLOOD IN STOOL: 0
PARALYSIS: 0
COUGH DISTURBING SLEEP: 0
INCREASED ENERGY: 0
NECK PAIN: 0
ALTERED TEMPERATURE REGULATION: 0
CONSTIPATION: 0
FATIGUE: 0
BREAST MASS: 0
HEARTBURN: 0
LIGHT-HEADEDNESS: 0
MUSCLE CRAMPS: 0
JAUNDICE: 0
CHILLS: 0
NUMBNESS: 0
BREAST PAIN: 0
SEIZURES: 0
RECTAL BLEEDING: 0
RESPIRATORY PAIN: 0
SNORES LOUDLY: 0
TREMORS: 0
POLYPHAGIA: 0
ORTHOPNEA: 0
BRUISES/BLEEDS EASILY: 0
EYE IRRITATION: 0
BLOATING: 0
DIFFICULTY URINATING: 0
FLANK PAIN: 0
VOMITING: 0
POSTURAL DYSPNEA: 0
DISTURBANCES IN COORDINATION: 0
SYNCOPE: 0
EYE WATERING: 0
BOWEL INCONTINENCE: 0
DIZZINESS: 0
HOT FLASHES: 0
EYE REDNESS: 0
WHEEZING: 0
SHORTNESS OF BREATH: 0
NAIL CHANGES: 0
DIARRHEA: 0
HEMOPTYSIS: 0
WEAKNESS: 0
TACHYCARDIA: 0
DYSURIA: 0
HALLUCINATIONS: 0
EXTREMITY NUMBNESS: 0
HEADACHES: 0
POOR WOUND HEALING: 0
DYSPNEA ON EXERTION: 0
INSOMNIA: 0
SWOLLEN GLANDS: 0
JOINT SWELLING: 0
EXERCISE INTOLERANCE: 0
FEVER: 0
LEG SWELLING: 0
LEG PAIN: 0
RECTAL PAIN: 0
SINUS PAIN: 0
DECREASED APPETITE: 0
SLEEP DISTURBANCES DUE TO BREATHING: 0
COUGH: 0
SINUS CONGESTION: 0
BACK PAIN: 0
ARTHRALGIAS: 0

## 2021-11-21 NOTE — PROGRESS NOTES
Follow Up Notes on Referred Patient    Date: 2021       Dr. Joe Stratton, MD  [address] [place] [address]       RE: Kelley Serra  : 1990  JAYASHREE: 2021    Dear Dr. 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 She completed treatment on 10/2/15 with an exploratory laparotomy, RSO, and fertility sparing staging. She presents today for an annual surveillance visit.    Brief Oncology History:  9/29/15: Abdominal pain, 30cm pelvic mass.  73.  10/2/15: Sent to OR for ex lap, RSO, and fertility sparing staging. 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 Elected observation at this time.  16:  13  16:  9, Pap NIL  10/31/16:  10  17:  13  17:  10  17:  10  10/27/17:  10  10/28/17: Pelvic ultrasound:  IMPRESSION:   1. Normal sonographic evaluation of the uterus and left ovary.  2. Trace free fluid in the pelvis which may be physiologic.   18:  9  10/20/18: Pelvic US impression:  Right ovary is surgically absent. Uterus, endometrial  stripe and left ovary are unremarkable. Small amount of free pelvic  fluid is likely physiologic.     10/21/19: Pelvic US:  IMPRESSION:  1. 1.6 cm complex left ovarian cyst.  2. Otherwise unremarkable pelvis post right oophorectomy.     10/30/19:  10, Pap/HPV NILM HPV negative    19: Pelvic US:   IMPRESSION:  1. 1.7 cm simple cyst in the left ovary, likely a dominant follicle.  The complex cyst that was present in the left ovary on the comparison  study dated 10/21/2019 is no longer visualized and was likely a functional hemorrhagic cyst.  2. Unremarkable appearance of the uterus.  3. Prior right oophorectomy.    10/26/2020: Pelvic US:   FINDINGS:  UTERUS: 7.3 x 3.4 x 4.7 cm. Several small nabothian cysts are  "noted  within the cervix. Otherwise unremarkable in size and position with no  masses.     ENDOMETRIUM: 11 mm. Normal smooth endometrium. There is a small amount  of nonspecific fluid within the endocervical canal.     RIGHT OVARY: The right ovary is not visualized, and is surgically  absent by history.      LEFT OVARY: 5 x 1.9 x 2.9 cm. Normal with flow demonstrated.     Small amount of nonspecific free fluid in the pelvis.                                                                      IMPRESSION:  1.  Postoperative changes of prior right oophorectomy.  2.  Small amount of free fluid in the pelvis is nonspecific, and may  be physiologic.  3.  Small amount of fluid within the endocervical canal is also  nonsignificant, and could be related to the presence of blood  products. Please clinically correlate.     TEE KRAMER MD    10/29/2020:  24  10/29/2021:  10   10/29/2021: TVUS: IMPRESSION:  1.  Surgical changes of right oophorectomy without evidence of recurrence.  2.  Endometrial stripe is slightly thickened with prominent vascularity without discrete focal polyp. This is most likely related to stage of menstrual cycle with LMP 10/1/2021 noted by the technologist. Correlate with any abnormal vaginal bleeding or pelvic symptoms. Could consider short-term follow-up pelvic ultrasound after next 2 menstrual cycles to reevaluate.    11/22/2021: Pap with HPV pending           Today Oscar comes to the clinic overall feeling well. She states that her menstrual cycle is due this Saturday. Pelvic pain continues at the start of period at the right lower quadrant abdomen and will have pain across abdomen intermittently throughout period. She does have pain during bowel movements during menstrual cycle that is new and started over the last year - shooting pain bilateral abdomen, feels \"paralyzed for a few seconds\". She denies blood in stools. Has had colonscopy 5 years ago (mother has had colon cancer) and " endoscopy which were both normal.  She takes ibuprofen for this which controls the pain. Periods are 9 days in length, periods will start one day and then stop and then return for 9 days, flow is average (2-3 tampons per day, not soaking). No intermenstrual bleeding or bleeding after intercourse. Some bloody discharge during ovulation about one week after her period. Thick tachy light brown. This happens monthly and lasts for about one day. No birth control as  had vasetctomy a few years ago. No pelvic pain outside of period. She denies any abnormal vaginal bleeding, no changes in her bowel or bladder habits, no nausea/emesis, no lower extremity edema, and no difficulties eating or sleeping. She denies any abdominal discomfort/bloating, no fevers or chills, and no chest pain or shortness of breath. She continues to see Endocrine for Hashimoto's.           Review of Systems     Constitutional:  Negative for fever, chills, weight loss, weight gain, fatigue, decreased appetite, night sweats, recent stressors, height gain, height loss, post-operative complications, incisional pain, hallucinations, increased energy, hyperactivity and confused.   HENT:  Negative for ear pain, hearing loss, tinnitus, nosebleeds, trouble swallowing, hoarse voice, mouth sores, sore throat, ear discharge, tooth pain, gum tenderness, taste disturbance, smell disturbance, hearing aid, bleeding gums, dry mouth, sinus pain, sinus congestion and neck mass.    Eyes:  Negative for double vision, pain, redness, eye pain, decreased vision, eye watering, eye bulging, eye dryness, flashing lights, spots, floaters, strabismus, tunnel vision, jaundice and eye irritation.   Respiratory:   Negative for cough, hemoptysis, sputum production, shortness of breath, wheezing, sleep disturbances due to breathing, snores loudly, respiratory pain, dyspnea on exertion, cough disturbing sleep and postural dyspnea.    Cardiovascular:  Negative for chest pain,  dyspnea on exertion, palpitations, orthopnea, claudication, leg swelling, fingers/toes turn blue, hypertension, hypotension, syncope, history of heart murmur, chest pain on exertion, chest pain at rest, pacemaker, few scattered varicosities, leg pain, sleep disturbances due to breathing, tachycardia, light-headedness, exercise intolerance and edema.   Gastrointestinal:  Negative for heartburn, nausea, vomiting, abdominal pain, diarrhea, constipation, blood in stool, melena, rectal pain, bloating, hemorrhoids, bowel incontinence, jaundice, rectal bleeding, coffee ground emesis and change in stool.   Genitourinary:  Positive for abnormal vaginal bleeding. Negative for bladder incontinence, dysuria, urgency, hematuria, flank pain, vaginal discharge, difficulty urinating, genital sores, dyspareunia, decreased libido, nocturia, voiding less frequently, arousal difficulty, excessive menstruation, menstrual changes, hot flashes, vaginal dryness and postmenopausal bleeding.   Musculoskeletal:  Negative for myalgias, back pain, joint swelling, arthralgias, stiffness, muscle cramps, neck pain, bone pain, muscle weakness and fracture.   Skin:  Negative for nail changes, itching, poor wound healing, rash, hair changes, skin changes, acne, warts, poor wound healing, scarring, flaky skin, Raynaud's phenomenon, sensitivity to sunlight and skin thickening.   Neurological:  Negative for dizziness, tingling, tremors, speech change, seizures, loss of consciousness, weakness, light-headedness, numbness, headaches, disturbances in coordination, extremity numbness, memory loss, difficulty walking and paralysis.   Endo/Heme:  Negative for anemia, swollen glands and bruises/bleeds easily.   Psychiatric/Behavioral:  Negative for depression, hallucinations, memory loss, decreased concentration, mood swings and panic attacks.    Breast:  Negative for breast discharge, breast mass, breast pain and nipple retraction.   Endocrine:  Negative for  altered temperature regulation, polyphagia, polydipsia, unwanted hair growth and change in facial hair.        Past Medical History:    Past Medical History:   Diagnosis Date     Allergic rhinitis due to animal dander      Allergy to mold spores     2/26/14 skin tests pos. for:  dog(+)/DM/M only and only on intradermals--ie, minimally allergic     Asthma, mild intermittent     last inhaler usage >6 years ago (4/11)     Cancer (H) 9/2015    Stage 1A ovarian cancer     Diagnostic skin and sensitization tests 2/26/14 skin tests pos. 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    treated with RSO     Thyroid disease 10/2020    Hashimoto's disease         Past Surgical History:    Past Surgical History:   Procedure Laterality Date     APPENDECTOMY  10/2/2015     BIOPSY  10/2/2015     COLONOSCOPY N/A 12/2/2015    Procedure: COLONOSCOPY;  Surgeon: Aaron Garrison MD;  Location:  GI     ESOPHAGOSCOPY, GASTROSCOPY, DUODENOSCOPY (EGD), COMBINED N/A 11/23/2015    Procedure: COMBINED ESOPHAGOSCOPY, GASTROSCOPY, DUODENOSCOPY (EGD), BIOPSY SINGLE OR MULTIPLE;  Surgeon: Babs Cassidy MD;  Location:  GI     GYN SURGERY  10/2/2015     LAPAROTOMY EXPLORATORY  10/2/15    Right salpingo-oophorectomy, omentectomy, appendectomy, lymph node biopsies, cancer staging      LAPAROTOMY, STAGING, COMBINED N/A 10/2/2015    Procedure: COMBINED LAPAROTOMY, STAGING;  Surgeon: Duyen Thayer MD;  Location:  OR         Health Maintenance Due   Topic Date Due     ADVANCE CARE PLANNING  Never done     HIV SCREENING  Never done     HEPATITIS B IMMUNIZATION (2 of 3 - 3-dose primary series) 01/26/2004     HEPATITIS C SCREENING  Never done     PREVENTIVE CARE VISIT  04/25/2020     DTAP/TDAP/TD IMMUNIZATION (4 - Td or Tdap) 09/02/2021       Current Medications:     Current  Outpatient Medications   Medication Sig Dispense Refill     Cholecalciferol (VITAMIN D-3 PO)        FLUoxetine (PROZAC) 20 MG capsule Take 1 capsule (20 mg) by mouth daily After stopping the venlaxafine 37.5 mg start the fluoxetine 20 mg daily 30 capsule 3     LACTOBACILLUS PO 50 billion       levothyroxine (SYNTHROID/LEVOTHROID) 50 MCG tablet Take 1 tablet (50 mcg) by mouth daily 90 tablet 3     levothyroxine (SYNTHROID/LEVOTHROID) 75 MCG tablet Take 0.5 tablets (37.5 mcg) by mouth daily 15 tablet 11     liothyronine (CYTOMEL) 5 MCG tablet Take 1 tablet (5 mcg) by mouth daily 30 tablet 11     loratadine (CLARITIN) 10 MG tablet Take 1 tablet (10 mg) by mouth daily 30 tablet 1     MULTIPLE VITAMINS PO        NONFORMULARY 40 billion CFU S. Bouladarii (probiotic)       Selenium 200 MCG TABS tablet Take 200 mcg by mouth daily       Turmeric (QC TUMERIC COMPLEX PO) 2400 mg curcurmin w/ black pepper       venlafaxine (EFFEXOR-XR) 75 MG 24 hr capsule Take 1 capsule (75 mg) by mouth daily Start taking this after one week on the 37.5 mg capsule 30 capsule 1         Allergies:        Allergies   Allergen Reactions     Cleocin Rash     Keflex [Cephalexin Monohydrate] Rash     Zoloft         Social History:     Social History     Tobacco Use     Smoking status: Never Smoker     Smokeless tobacco: Never Used   Substance Use Topics     Alcohol use: Yes     Comment: socially       History   Drug Use Unknown         Family History:     The patient's family history is notable for     Family History   Problem Relation Age of Onset     Diabetes Mother         gestational     Thyroid Disease Mother      Blood Disease Mother         anemia     Cancer - colorectal Mother 54     Colon Cancer Mother         Doing well     Cancer Maternal Grandmother      Blood Disease Maternal Grandmother         cervical     Respiratory Maternal Grandmother         emphysema     Diabetes Maternal Grandfather              Cerebrovascular Disease  "Maternal Grandfather      Cancer Maternal Grandfather         kidney     Breast Cancer Paternal Grandmother              Diabetes Paternal Grandfather              Cerebrovascular Disease Paternal Grandfather      Skin Cancer Paternal Grandfather      Heart Disease Father 56        had angioplasty     Coronary Artery Disease Father         Had angioplasty in 2014     Substance Abuse Father         Has abused alcohol since before I was born     Mental Illness Sister         Borderline personality disorder         Physical Exam:     BP 95/67 (BP Location: Right arm, Patient Position: Sitting, Cuff Size: Adult Regular)   Pulse 104   Temp 98.5  F (36.9  C) (Oral)   Resp 16   Ht 1.613 m (5' 3.5\")   Wt 59.5 kg (131 lb 1.6 oz)   SpO2 97%   BMI 22.86 kg/m    Body mass index is 22.86 kg/m .    General Appearance: healthy and alert, no distress     HEENT: no thyromegaly, no palpable nodules or masses        Cardiovascular: regular rate and rhythm, no gallops, rubs or murmurs     Respiratory: lungs clear, no rales, rhonchi or wheezes    Musculoskeletal: extremities non tender and without edema    Skin: no lesions or rashes     Neurological: normal gait, no gross defects     Psychiatric: appropriate mood and affect                               Hematological: normal cervical, supraclavicular and inguinal lymph nodes     Gastrointestinal:       abdomen soft, non-tender, non-distended, no organomegaly or masses    Genitourinary: External genitalia and urethral meatus appears normal.  Vagina is smooth without nodularity or masses. Cervical os erythematous without lesions (pap obtained). Bimanual exam reveal no masses, nodularity or fullness.  Recto-vaginal exam confirms these findings.      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 She completed treatment on 10/2/15 with an exploratory laparotomy, RSO, and " fertility sparing staging. She presents today for an annual surveillance visit.    A total of 30 minutes was spent with the patient, over 50% minutes of which were spent in counseling the patient and/or treatment planning.      Plan:     1) History of borderline mucinous tumor of the right ovary:  stable and normal. Pelvic US reviewed with patient and Dr. Pierre. Will need to repeat TVUS in two months per MD due to thickened endometrium (likely related to menses); pt does not need endo bx as having regular periods and flow without intermenstrual bleeding. Will order TVUS and a return visit with Dr. Stratton in two months to discuss and have baseline MD visit. Pap smear obtained today due to cervical os erythema (likely related to hormonal changes but will rule out with pap and hpv testing). Encouraged patient to monitor her pelvic pain and if it occurs outside of ovulation and menses and is sustained to let our office know. Her blood tinged mucous 1-2 weeks after menses is normal around the time of ovulation. Reviewed signs and symptoms for when she should contact the clinic or seek additional care. Patient to contact the clinic with any questions or concerns in the interim.     2) Genetic testing: Risk factors have been assessed and the patient does not meet qualifications for genetic counseling based on NCCN guidelines.     3) Labs:  and pelvic ultrasound reviewed today; pap with hpv obtained today. CT CAP within one week per Dr Lowe to evaluate the ongoing new abdominal pain with bowel movements during menses.     4) Health maintenance issues discussed today include to follow up with PCP for co-morbid conditions and non-gynecologic issues.     5) Thyroid: newly diagnosed Hashimoto's thyroiditis. Oscar has started Synthroid for this. She will continue to follow up with endocrinology.           CLAUDIA Faye, NP-BC  Women's Health Nurse Practitioner  Division of Gynecologic Oncology  [facility]        CC  Patient Care Team:  Ariadne Jimenez MD as PCP - General (Family Medicine)  Myra Gifford APRN CNP as Nurse Practitioner (Gynecologic Oncology)  Miguel A Mercer MD as MD (Oncology)  Alexx Still DO as MD (Neurology)  Alexx Still DO as Assigned Neuroscience Provider  Alma Edwards APRN CNP as Assigned Cancer Care Provider  Devan Mcpherson MD as Assigned Endocrinology Provider  Ariadne Jimenez MD as Assigned PCP  Romina Strauss PA-C as Physician Assistant (Dermatology)  MIGUEL A MERCER

## 2021-11-22 ENCOUNTER — ONCOLOGY VISIT (OUTPATIENT)
Dept: ONCOLOGY | Facility: CLINIC | Age: 31
End: 2021-11-22
Attending: OBSTETRICS & GYNECOLOGY
Payer: COMMERCIAL

## 2021-11-22 VITALS
DIASTOLIC BLOOD PRESSURE: 67 MMHG | HEIGHT: 64 IN | TEMPERATURE: 98.5 F | WEIGHT: 131.1 LBS | BODY MASS INDEX: 22.38 KG/M2 | OXYGEN SATURATION: 97 % | HEART RATE: 104 BPM | RESPIRATION RATE: 16 BRPM | SYSTOLIC BLOOD PRESSURE: 95 MMHG

## 2021-11-22 DIAGNOSIS — R93.89 ENDOMETRIAL THICKENING ON ULTRASOUND: ICD-10-CM

## 2021-11-22 DIAGNOSIS — D39.11 OVARIAN TUMOR OF BORDERLINE MALIGNANCY, RIGHT: Primary | ICD-10-CM

## 2021-11-22 DIAGNOSIS — R10.84 ABDOMINAL PAIN, GENERALIZED: ICD-10-CM

## 2021-11-22 PROCEDURE — G0463 HOSPITAL OUTPT CLINIC VISIT: HCPCS | Mod: 25

## 2021-11-22 PROCEDURE — 87624 HPV HI-RISK TYP POOLED RSLT: CPT | Performed by: OBSTETRICS & GYNECOLOGY

## 2021-11-22 PROCEDURE — G0463 HOSPITAL OUTPT CLINIC VISIT: HCPCS

## 2021-11-22 PROCEDURE — G0145 SCR C/V CYTO,THINLAYER,RESCR: HCPCS | Performed by: OBSTETRICS & GYNECOLOGY

## 2021-11-22 PROCEDURE — 99214 OFFICE O/P EST MOD 30 MIN: CPT | Performed by: OBSTETRICS & GYNECOLOGY

## 2021-11-22 ASSESSMENT — MIFFLIN-ST. JEOR: SCORE: 1276.8

## 2021-11-22 ASSESSMENT — PAIN SCALES - GENERAL: PAINLEVEL: NO PAIN (0)

## 2021-11-22 NOTE — LETTER
2021         RE: Kelley Serra   [facility] [address]        Dear Colleague,    Thank you for referring your patient, Kelley Serra, to the [facility]. Please see a copy of my visit note below.                   Follow Up Notes on Referred Patient    Date: 2021       Dr. Joe Stratton MD  [address] [place] [address]       RE: Kelley Serra  : 1990  JAYASHREE: 2021    Dear Dr. 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 She completed treatment on 10/2/15 with an exploratory laparotomy, RSO, and fertility sparing staging. She presents today for an annual surveillance visit.    Brief Oncology History:  9/29/15: Abdominal pain, 30cm pelvic mass.  73.  10/2/15: Sent to OR for ex lap, RSO, and fertility sparing staging. 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 Elected observation at this time.  16:  13  16:  9, Pap NIL  10/31/16:  10  17:  13  17:  10  17:  10  10/27/17:  10  10/28/17: Pelvic ultrasound:  IMPRESSION:   1. Normal sonographic evaluation of the uterus and left ovary.  2. Trace free fluid in the pelvis which may be physiologic.   18:  9  10/20/18: Pelvic US impression:  Right ovary is surgically absent. Uterus, endometrial  stripe and left ovary are unremarkable. Small amount of free pelvic  fluid is likely physiologic.     10/21/19: Pelvic US:  IMPRESSION:  1. 1.6 cm complex left ovarian cyst.  2. Otherwise unremarkable pelvis post right oophorectomy.     10/30/19:  10, Pap/HPV NILM HPV negative    19: Pelvic US:   IMPRESSION:  1. 1.7 cm simple cyst in the left ovary, likely a dominant follicle.  The complex cyst that was present in the left ovary on the comparison  study dated  10/21/2019 is no longer visualized and was likely a functional hemorrhagic cyst.  2. Unremarkable appearance of the uterus.  3. Prior right oophorectomy.    10/26/2020: Pelvic US:   FINDINGS:  UTERUS: 7.3 x 3.4 x 4.7 cm. Several small nabothian cysts are noted  within the cervix. Otherwise unremarkable in size and position with no  masses.     ENDOMETRIUM: 11 mm. Normal smooth endometrium. There is a small amount  of nonspecific fluid within the endocervical canal.     RIGHT OVARY: The right ovary is not visualized, and is surgically  absent by history.      LEFT OVARY: 5 x 1.9 x 2.9 cm. Normal with flow demonstrated.     Small amount of nonspecific free fluid in the pelvis.                                                                      IMPRESSION:  1.  Postoperative changes of prior right oophorectomy.  2.  Small amount of free fluid in the pelvis is nonspecific, and may  be physiologic.  3.  Small amount of fluid within the endocervical canal is also  nonsignificant, and could be related to the presence of blood  products. Please clinically correlate.     TEE KRAMER MD    10/29/2020:  24  10/29/2021:  10   10/29/2021: TVUS: IMPRESSION:  1.  Surgical changes of right oophorectomy without evidence of recurrence.  2.  Endometrial stripe is slightly thickened with prominent vascularity without discrete focal polyp. This is most likely related to stage of menstrual cycle with LMP 10/1/2021 noted by the technologist. Correlate with any abnormal vaginal bleeding or pelvic symptoms. Could consider short-term follow-up pelvic ultrasound after next 2 menstrual cycles to reevaluate.    11/22/2021: Pap with HPV pending           Today Oscar comes to the clinic overall feeling well. She states that her menstrual cycle is due this Saturday. Pelvic pain continues at the start of period at the right lower quadrant abdomen and will have pain across abdomen intermittently throughout period. She does have pain  "during bowel movements during menstrual cycle that is new and started over the last year - shooting pain bilateral abdomen, feels \"paralyzed for a few seconds\". She denies blood in stools. Has had colonscopy 5 years ago (mother has had colon cancer) and endoscopy which were both normal.  She takes ibuprofen for this which controls the pain. Periods are 9 days in length, periods will start one day and then stop and then return for 9 days, flow is average (2-3 tampons per day, not soaking). No intermenstrual bleeding or bleeding after intercourse. Some bloody discharge during ovulation about one week after her period. Thick tachy light brown. This happens monthly and lasts for about one day. No birth control as  had vasetctomy a few years ago. No pelvic pain outside of period. She denies any abnormal vaginal bleeding, no changes in her bowel or bladder habits, no nausea/emesis, no lower extremity edema, and no difficulties eating or sleeping. She denies any abdominal discomfort/bloating, no fevers or chills, and no chest pain or shortness of breath. She continues to see Endocrine for Hashimoto's.           Review of Systems     Constitutional:  Negative for fever, chills, weight loss, weight gain, fatigue, decreased appetite, night sweats, recent stressors, height gain, height loss, post-operative complications, incisional pain, hallucinations, increased energy, hyperactivity and confused.   HENT:  Negative for ear pain, hearing loss, tinnitus, nosebleeds, trouble swallowing, hoarse voice, mouth sores, sore throat, ear discharge, tooth pain, gum tenderness, taste disturbance, smell disturbance, hearing aid, bleeding gums, dry mouth, sinus pain, sinus congestion and neck mass.    Eyes:  Negative for double vision, pain, redness, eye pain, decreased vision, eye watering, eye bulging, eye dryness, flashing lights, spots, floaters, strabismus, tunnel vision, jaundice and eye irritation.   Respiratory:   Negative " for cough, hemoptysis, sputum production, shortness of breath, wheezing, sleep disturbances due to breathing, snores loudly, respiratory pain, dyspnea on exertion, cough disturbing sleep and postural dyspnea.    Cardiovascular:  Negative for chest pain, dyspnea on exertion, palpitations, orthopnea, claudication, leg swelling, fingers/toes turn blue, hypertension, hypotension, syncope, history of heart murmur, chest pain on exertion, chest pain at rest, pacemaker, few scattered varicosities, leg pain, sleep disturbances due to breathing, tachycardia, light-headedness, exercise intolerance and edema.   Gastrointestinal:  Negative for heartburn, nausea, vomiting, abdominal pain, diarrhea, constipation, blood in stool, melena, rectal pain, bloating, hemorrhoids, bowel incontinence, jaundice, rectal bleeding, coffee ground emesis and change in stool.   Genitourinary:  Positive for abnormal vaginal bleeding. Negative for bladder incontinence, dysuria, urgency, hematuria, flank pain, vaginal discharge, difficulty urinating, genital sores, dyspareunia, decreased libido, nocturia, voiding less frequently, arousal difficulty, excessive menstruation, menstrual changes, hot flashes, vaginal dryness and postmenopausal bleeding.   Musculoskeletal:  Negative for myalgias, back pain, joint swelling, arthralgias, stiffness, muscle cramps, neck pain, bone pain, muscle weakness and fracture.   Skin:  Negative for nail changes, itching, poor wound healing, rash, hair changes, skin changes, acne, warts, poor wound healing, scarring, flaky skin, Raynaud's phenomenon, sensitivity to sunlight and skin thickening.   Neurological:  Negative for dizziness, tingling, tremors, speech change, seizures, loss of consciousness, weakness, light-headedness, numbness, headaches, disturbances in coordination, extremity numbness, memory loss, difficulty walking and paralysis.   Endo/Heme:  Negative for anemia, swollen glands and bruises/bleeds easily.    Psychiatric/Behavioral:  Negative for depression, hallucinations, memory loss, decreased concentration, mood swings and panic attacks.    Breast:  Negative for breast discharge, breast mass, breast pain and nipple retraction.   Endocrine:  Negative for altered temperature regulation, polyphagia, polydipsia, unwanted hair growth and change in facial hair.        Past Medical History:    Past Medical History:   Diagnosis Date     Allergic rhinitis due to animal dander      Allergy to mold spores     2/26/14 skin tests pos. for:  dog(+)/DM/M only and only on intradermals--ie, minimally allergic     Asthma, mild intermittent     last inhaler usage >6 years ago (4/11)     Cancer (H) 9/2015    Stage 1A ovarian cancer     Diagnostic skin and sensitization tests 2/26/14 skin tests pos. 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    treated with RSO     Thyroid disease 10/2020    Hashimoto's disease         Past Surgical History:    Past Surgical History:   Procedure Laterality Date     APPENDECTOMY  10/2/2015     BIOPSY  10/2/2015     COLONOSCOPY N/A 12/2/2015    Procedure: COLONOSCOPY;  Surgeon: Aaron Garrison MD;  Location:  GI     ESOPHAGOSCOPY, GASTROSCOPY, DUODENOSCOPY (EGD), COMBINED N/A 11/23/2015    Procedure: COMBINED ESOPHAGOSCOPY, GASTROSCOPY, DUODENOSCOPY (EGD), BIOPSY SINGLE OR MULTIPLE;  Surgeon: Babs Cassidy MD;  Location:  GI     GYN SURGERY  10/2/2015     LAPAROTOMY EXPLORATORY  10/2/15    Right salpingo-oophorectomy, omentectomy, appendectomy, lymph node biopsies, cancer staging      LAPAROTOMY, STAGING, COMBINED N/A 10/2/2015    Procedure: COMBINED LAPAROTOMY, STAGING;  Surgeon: Duyen Thayer MD;  Location:  OR         Health Maintenance Due   Topic Date Due     ADVANCE CARE PLANNING  Never done     HIV SCREENING  Never  done     HEPATITIS B IMMUNIZATION (2 of 3 - 3-dose primary series) 01/26/2004     HEPATITIS C SCREENING  Never done     PREVENTIVE CARE VISIT  04/25/2020     DTAP/TDAP/TD IMMUNIZATION (4 - Td or Tdap) 09/02/2021       Current Medications:     Current Outpatient Medications   Medication Sig Dispense Refill     Cholecalciferol (VITAMIN D-3 PO)        FLUoxetine (PROZAC) 20 MG capsule Take 1 capsule (20 mg) by mouth daily After stopping the venlaxafine 37.5 mg start the fluoxetine 20 mg daily 30 capsule 3     LACTOBACILLUS PO 50 billion       levothyroxine (SYNTHROID/LEVOTHROID) 50 MCG tablet Take 1 tablet (50 mcg) by mouth daily 90 tablet 3     levothyroxine (SYNTHROID/LEVOTHROID) 75 MCG tablet Take 0.5 tablets (37.5 mcg) by mouth daily 15 tablet 11     liothyronine (CYTOMEL) 5 MCG tablet Take 1 tablet (5 mcg) by mouth daily 30 tablet 11     loratadine (CLARITIN) 10 MG tablet Take 1 tablet (10 mg) by mouth daily 30 tablet 1     MULTIPLE VITAMINS PO        NONFORMULARY 40 billion CFU S. Bouladarii (probiotic)       Selenium 200 MCG TABS tablet Take 200 mcg by mouth daily       Turmeric (QC TUMERIC COMPLEX PO) 2400 mg curcurmin w/ black pepper       venlafaxine (EFFEXOR-XR) 75 MG 24 hr capsule Take 1 capsule (75 mg) by mouth daily Start taking this after one week on the 37.5 mg capsule 30 capsule 1         Allergies:        Allergies   Allergen Reactions     Cleocin Rash     Keflex [Cephalexin Monohydrate] Rash     Zoloft         Social History:     Social History     Tobacco Use     Smoking status: Never Smoker     Smokeless tobacco: Never Used   Substance Use Topics     Alcohol use: Yes     Comment: socially       History   Drug Use Unknown         Family History:     The patient's family history is notable for     Family History   Problem Relation Age of Onset     Diabetes Mother         gestational     Thyroid Disease Mother      Blood Disease Mother         anemia     Cancer - colorectal Mother 54     Colon Cancer  "Mother         Doing well     Cancer Maternal Grandmother      Blood Disease Maternal Grandmother         cervical     Respiratory Maternal Grandmother         emphysema     Diabetes Maternal Grandfather              Cerebrovascular Disease Maternal Grandfather      Cancer Maternal Grandfather         kidney     Breast Cancer Paternal Grandmother              Diabetes Paternal Grandfather              Cerebrovascular Disease Paternal Grandfather      Skin Cancer Paternal Grandfather      Heart Disease Father 56        had angioplasty     Coronary Artery Disease Father         Had angioplasty in 2014     Substance Abuse Father         Has abused alcohol since before I was born     Mental Illness Sister         Borderline personality disorder         Physical Exam:     BP 95/67 (BP Location: Right arm, Patient Position: Sitting, Cuff Size: Adult Regular)   Pulse 104   Temp 98.5  F (36.9  C) (Oral)   Resp 16   Ht 1.613 m (5' 3.5\")   Wt 59.5 kg (131 lb 1.6 oz)   SpO2 97%   BMI 22.86 kg/m    Body mass index is 22.86 kg/m .    General Appearance: healthy and alert, no distress     HEENT: no thyromegaly, no palpable nodules or masses        Cardiovascular: regular rate and rhythm, no gallops, rubs or murmurs     Respiratory: lungs clear, no rales, rhonchi or wheezes    Musculoskeletal: extremities non tender and without edema    Skin: no lesions or rashes     Neurological: normal gait, no gross defects     Psychiatric: appropriate mood and affect                               Hematological: normal cervical, supraclavicular and inguinal lymph nodes     Gastrointestinal:       abdomen soft, non-tender, non-distended, no organomegaly or masses    Genitourinary: External genitalia and urethral meatus appears normal.  Vagina is smooth without nodularity or masses. Cervical os erythematous without lesions (pap obtained). Bimanual exam reveal no masses, nodularity or fullness.  Recto-vaginal " exam confirms these findings.      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 She completed treatment on 10/2/15 with an exploratory laparotomy, RSO, and fertility sparing staging. She presents today for an annual surveillance visit.    A total of 30 minutes was spent with the patient, over 50% minutes of which were spent in counseling the patient and/or treatment planning.      Plan:     1) History of borderline mucinous tumor of the right ovary:  stable and normal. Pelvic US reviewed with patient and Dr. Pierre. Will need to repeat TVUS in two months per MD due to thickened endometrium (likely related to menses); pt does not need endo bx as having regular periods and flow without intermenstrual bleeding. Will order TVUS and a return visit with Dr. Stratton in two months to discuss and have baseline MD visit. Pap smear obtained today due to cervical os erythema (likely related to hormonal changes but will rule out with pap and hpv testing). Encouraged patient to monitor her pelvic pain and if it occurs outside of ovulation and menses and is sustained to let our office know. Her blood tinged mucous 1-2 weeks after menses is normal around the time of ovulation. Reviewed signs and symptoms for when she should contact the clinic or seek additional care. Patient to contact the clinic with any questions or concerns in the interim.     2) Genetic testing: Risk factors have been assessed and the patient does not meet qualifications for genetic counseling based on NCCN guidelines.     3) Labs:  and pelvic ultrasound reviewed today; pap with hpv obtained today. CT CAP within one week per Dr Lowe to evaluate the ongoing new abdominal pain with bowel movements during menses.     4) Health maintenance issues discussed today include to follow up with PCP for co-morbid conditions and non-gynecologic issues.     5) Thyroid: newly  diagnosed Hashimoto's thyroiditis. Oscar has started Synthroid for this. She will continue to follow up with endocrinology.     CLAUDIA Faye, NP-BC  Women's Health Nurse Practitioner  Division of Gynecologic Oncology  [facility]    CC  Patient Care Team:  Ariadne Jimenez MD as PCP - General (Family Medicine)  Myra Gifford APRN CNP as Nurse Practitioner (Gynecologic Oncology)  Joe Stratton MD as MD (Oncology)  Alexx Still DO as MD (Neurology)  Alma Edwards APRN CNP as Assigned Cancer Care Provider  Devan Mcpherson MD as Assigned Endocrinology Provider  Ariadne Jimenez MD as Assigned PCP  Romina Strauss PA-C as Physician Assistant (Dermatology)

## 2021-11-22 NOTE — NURSING NOTE
"Oncology Rooming Note    November 22, 2021 3:09 PM   Kelley Serra is a 31 year old female who presents for:    Chief Complaint   Patient presents with     Oncology Clinic Visit     OVARIAN TUMOR OF BORDERLINE MALIGNANCY, RIGHT     Initial Vitals: BP 95/67 (BP Location: Right arm, Patient Position: Sitting, Cuff Size: Adult Regular)   Pulse 104   Temp 98.5  F (36.9  C) (Oral)   Resp 16   Ht 1.613 m (5' 3.5\")   Wt 59.5 kg (131 lb 1.6 oz)   SpO2 97%   BMI 22.86 kg/m   Estimated body mass index is 22.86 kg/m  as calculated from the following:    Height as of this encounter: 1.613 m (5' 3.5\").    Weight as of this encounter: 59.5 kg (131 lb 1.6 oz). Body surface area is 1.63 meters squared.  No Pain (0) Comment: Data Unavailable   No LMP recorded.  Allergies reviewed: Yes  Medications reviewed: Yes    Medications: Medication refills not needed today.  Pharmacy [facility] entered into [facility]: [id number] - [place] [id number] & [facility]    Clinical concerns: None.       Tiffani Mack CMA            "

## 2021-11-24 LAB
BKR LAB AP GYN ADEQUACY: NORMAL
BKR LAB AP GYN INTERPRETATION: NORMAL
BKR LAB AP HPV REFLEX: NORMAL
BKR LAB AP PREVIOUS ABNORMAL: NORMAL
PATH REPORT.COMMENTS IMP SPEC: NORMAL
PATH REPORT.COMMENTS IMP SPEC: NORMAL
PATH REPORT.RELEVANT HX SPEC: NORMAL

## 2021-11-29 LAB
HUMAN PAPILLOMA VIRUS 16 DNA: NEGATIVE
HUMAN PAPILLOMA VIRUS 18 DNA: NEGATIVE
HUMAN PAPILLOMA VIRUS FINAL DIAGNOSIS: NORMAL
HUMAN PAPILLOMA VIRUS OTHER HR: NEGATIVE

## 2021-12-09 ENCOUNTER — ANCILLARY PROCEDURE (OUTPATIENT)
Dept: CT IMAGING | Facility: CLINIC | Age: 31
End: 2021-12-09
Attending: OBSTETRICS & GYNECOLOGY
Payer: COMMERCIAL

## 2021-12-09 DIAGNOSIS — R10.84 ABDOMINAL PAIN, GENERALIZED: ICD-10-CM

## 2021-12-09 DIAGNOSIS — D39.11 OVARIAN TUMOR OF BORDERLINE MALIGNANCY, RIGHT: ICD-10-CM

## 2021-12-09 PROCEDURE — 71260 CT THORAX DX C+: CPT | Mod: TC | Performed by: RADIOLOGY

## 2021-12-09 PROCEDURE — 74177 CT ABD & PELVIS W/CONTRAST: CPT | Mod: TC | Performed by: RADIOLOGY

## 2021-12-09 RX ORDER — IOPAMIDOL 755 MG/ML
100 INJECTION, SOLUTION INTRAVASCULAR ONCE
Status: COMPLETED | OUTPATIENT
Start: 2021-12-09 | End: 2021-12-09

## 2021-12-09 RX ADMIN — Medication 57 ML: at 16:59

## 2021-12-09 RX ADMIN — IOPAMIDOL 80 ML: 755 INJECTION, SOLUTION INTRAVASCULAR at 16:59

## 2021-12-10 ENCOUNTER — MYC MEDICAL ADVICE (OUTPATIENT)
Dept: ONCOLOGY | Facility: CLINIC | Age: 31
End: 2021-12-10
Payer: COMMERCIAL

## 2022-01-02 ENCOUNTER — HEALTH MAINTENANCE LETTER (OUTPATIENT)
Age: 32
End: 2022-01-02

## 2022-01-06 ENCOUNTER — ANCILLARY PROCEDURE (OUTPATIENT)
Dept: ULTRASOUND IMAGING | Facility: CLINIC | Age: 32
End: 2022-01-06
Attending: OBSTETRICS & GYNECOLOGY
Payer: COMMERCIAL

## 2022-01-06 DIAGNOSIS — R93.89 ENDOMETRIAL THICKENING ON ULTRASOUND: ICD-10-CM

## 2022-01-06 DIAGNOSIS — D39.11 OVARIAN TUMOR OF BORDERLINE MALIGNANCY, RIGHT: ICD-10-CM

## 2022-01-06 PROCEDURE — 76830 TRANSVAGINAL US NON-OB: CPT | Performed by: RADIOLOGY

## 2022-01-06 PROCEDURE — 76856 US EXAM PELVIC COMPLETE: CPT | Performed by: RADIOLOGY

## 2022-01-12 ENCOUNTER — OFFICE VISIT (OUTPATIENT)
Dept: FAMILY MEDICINE | Facility: CLINIC | Age: 32
End: 2022-01-12
Payer: COMMERCIAL

## 2022-01-12 VITALS
SYSTOLIC BLOOD PRESSURE: 116 MMHG | DIASTOLIC BLOOD PRESSURE: 79 MMHG | HEART RATE: 96 BPM | BODY MASS INDEX: 22.77 KG/M2 | HEIGHT: 63 IN | TEMPERATURE: 98.2 F | WEIGHT: 128.5 LBS | OXYGEN SATURATION: 99 %

## 2022-01-12 DIAGNOSIS — E06.3 HASHIMOTO'S THYROIDITIS: ICD-10-CM

## 2022-01-12 DIAGNOSIS — M94.0 COSTOCHONDRITIS: Primary | ICD-10-CM

## 2022-01-12 LAB
T3FREE SERPL-MCNC: 3 PG/ML (ref 2.3–4.2)
T4 FREE SERPL-MCNC: 0.93 NG/DL (ref 0.76–1.46)
TSH SERPL DL<=0.005 MIU/L-ACNC: 1.67 MU/L (ref 0.4–4)

## 2022-01-12 PROCEDURE — 84443 ASSAY THYROID STIM HORMONE: CPT | Performed by: PHYSICIAN ASSISTANT

## 2022-01-12 PROCEDURE — 99213 OFFICE O/P EST LOW 20 MIN: CPT | Performed by: PHYSICIAN ASSISTANT

## 2022-01-12 PROCEDURE — 84439 ASSAY OF FREE THYROXINE: CPT | Performed by: PHYSICIAN ASSISTANT

## 2022-01-12 PROCEDURE — 36415 COLL VENOUS BLD VENIPUNCTURE: CPT | Performed by: PHYSICIAN ASSISTANT

## 2022-01-12 PROCEDURE — 84481 FREE ASSAY (FT-3): CPT | Performed by: PHYSICIAN ASSISTANT

## 2022-01-12 RX ORDER — NAPROXEN 250 MG/1
250 TABLET ORAL 2 TIMES DAILY WITH MEALS
Qty: 20 TABLET | Refills: 0 | Status: SHIPPED | OUTPATIENT
Start: 2022-01-12 | End: 2022-08-25

## 2022-01-12 ASSESSMENT — MIFFLIN-ST. JEOR: SCORE: 1260.61

## 2022-01-12 NOTE — PATIENT INSTRUCTIONS
Patient Education     Chest Wall Pain: Costochondritis    The chest pain that you have had today is caused by costochondritis. This condition is caused by an inflammation of the cartilage joining your ribs to your breastbone. It's not caused by heart or lung problems. Your healthcare team has made sure that the chest pain you feel is not from a life threatening cause of chest pain such as heart attack, collapsed lung, blood clot in the lung, tear in the aorta, or esophageal rupture. The inflammation may have been brought on by a blow to the chest, lifting heavy objects, intense exercise, or an illness that made you cough and sneeze a lot. It often occurs during times of emotional stress. It can be painful, but it's not dangerous. It usually goes away in 1 to 2 weeks. But it may happen again. Rarely, a more serious condition may cause symptoms similar to costochondritis. That s why it s important to watch for the warning signs listed below.   Home care  Follow these guidelines when caring for yourself at home:    If you feel that emotional stress is a cause of your condition, try to figure out the sources of that stress. It may not be obvious. Learn ways to deal with the stress in your life. This can include regular exercise, muscle relaxation, meditation, or simply taking time out for yourself.    You may use acetaminophen, ibuprofen, or naproxen to control pain, unless another pain medicine was prescribed. If you have liver or kidney disease or ever had a stomach ulcer, talk with your healthcare provider before using these medicines.    You can also help ease pain by using a hot, wet compress or heating pad. Use this with or without a medicated skin cream that helps relieves pain.    Do stretching exercise as advised by your provider. Typically rest is beneficial for the first few days. Avoid strenuous activity that worsens the pain.    Take any prescribed medicines as directed.  Follow-up care  Follow up with  your healthcare provider, or as advised.   When to seek medical advice  Call your healthcare provider right away if any of these occur:    A change in the type of pain. Call if it feels different, becomes more serious, lasts longer, or spreads into your shoulder, arm, neck, jaw, or back.    Shortness of breath or pain gets worse when you breathe    Weakness, dizziness, or fainting    Cough with dark-colored sputum (phlegm) or blood    Abdominal pain    Dark red or black stools    Fever of 100.4 F (38 C) or higher, or as directed by your healthcare provider  Jarad last reviewed this educational content on 6/1/2019 2000-2021 [employer]. All rights reserved. This information is not intended as a substitute for professional medical care. Always follow your healthcare professional's instructions.

## 2022-01-12 NOTE — PROGRESS NOTES
"  Assessment & Plan     Costochondritis  Patient education materials dispensed and reviewed.   - naproxen (NAPROSYN) 250 MG tablet; Take 1 tablet (250 mg) by mouth 2 times daily (with meals)    Hashimoto's thyroiditis  - TSH  - T3 Free  - T4 free - (Today)        Return in about 2 weeks (around 1/26/2022) for follow up if symptoms persist, change or worsen.    Reed De Jesus PA-C  [facility] KAYA Moore is a 31 year old who presents for the following health issues  accompanied by her self.    HPI     Patient presents with:  Other: have chest pain in right upper chest x 1 week. but now pain has radiated to neck. lying down does make the chest pain feels better. wonder if the increase of levothyroxine is causing this. did have levothyroxine dose insreased x 2.5 months ago. would like to get thyroid level check today if possible.      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  She noticed this while working.  Never had this before. No SOB or cough. No fever.     Review of Systems   Constitutional, HEENT, cardiovascular, pulmonary, gi and gu systems are negative, except as otherwise noted.      Objective    /79   Pulse 96   Temp 98.2  F (36.8  C) (Oral)   Ht 1.606 m (5' 3.23\")   Wt 58.3 kg (128 lb 8 oz)   SpO2 99%   BMI 22.60 kg/m    Body mass index is 22.6 kg/m .  Physical Exam   GENERAL: healthy, alert and no distress  NECK: no adenopathy, no asymmetry, masses, or scars and thyroid normal to palpation  RESP: lungs clear to auscultation - no rales, rhonchi or wheezes  CV: regular rate and rhythm, normal S1 S2, no S3 or S4, no murmur, click or rub, no peripheral edema and peripheral pulses strong  MS: neck exam shows normal strength, no torticollis and ROM is normal and tenderness with R anterior chest wall at sternocostal junctions.                 "

## 2022-01-13 DIAGNOSIS — E06.3 HASHIMOTO'S THYROIDITIS: Primary | ICD-10-CM

## 2022-03-02 ENCOUNTER — OFFICE VISIT (OUTPATIENT)
Dept: DERMATOLOGY | Facility: CLINIC | Age: 32
End: 2022-03-02
Payer: COMMERCIAL

## 2022-03-02 VITALS — SYSTOLIC BLOOD PRESSURE: 103 MMHG | HEART RATE: 93 BPM | DIASTOLIC BLOOD PRESSURE: 65 MMHG | OXYGEN SATURATION: 98 %

## 2022-03-02 DIAGNOSIS — L91.0 KELOID: ICD-10-CM

## 2022-03-02 DIAGNOSIS — L65.9 HYPOTRICHOSIS: Primary | ICD-10-CM

## 2022-03-02 PROCEDURE — 11900 INJECT SKIN LESIONS </W 7: CPT | Performed by: PHYSICIAN ASSISTANT

## 2022-03-02 PROCEDURE — 99213 OFFICE O/P EST LOW 20 MIN: CPT | Mod: 25 | Performed by: PHYSICIAN ASSISTANT

## 2022-03-02 RX ORDER — TRIAMCINOLONE ACETONIDE 40 MG/ML
40 INJECTION, SUSPENSION INTRA-ARTICULAR; INTRAMUSCULAR ONCE
Status: COMPLETED | OUTPATIENT
Start: 2022-03-02 | End: 2022-03-02

## 2022-03-02 RX ORDER — BIMATOPROST 3 UG/ML
1 SOLUTION TOPICAL AT BEDTIME
Qty: 5 ML | Refills: 3 | Status: SHIPPED | OUTPATIENT
Start: 2022-03-02 | End: 2022-03-16

## 2022-03-02 RX ADMIN — TRIAMCINOLONE ACETONIDE 40 MG: 40 INJECTION, SUSPENSION INTRA-ARTICULAR; INTRAMUSCULAR at 14:52

## 2022-03-02 NOTE — PROGRESS NOTES
HPI:  Kelley Serra is a 31 year old female patient here today for scar on abdomen. States developed scar after removal of ovarian cancer 2015 .  Patient states this has been present for years.  Patient reports the following symptoms: bothersome, rubs on clothing, bleeds, irritating .  Patient reports the following previous treatments: none.  Patient reports the following modifying factors: none.  Associated symptoms: none. Also notices loss of eyelashes on inferior eyelid margin. Falls out and then regrows. Does not pull hair. Has tried a topical from ophthalmology with no change.  Patient has no other skin complaints today.  Remainder of the HPI, Meds, PMH, Allergies, FH, and SH was reviewed in chart.      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 for:  dog(+)/DM/M only and only on intradermals--ie, minimally allergic     Asthma, mild intermittent     last inhaler usage >6 years ago (4/11)     Cancer (H) 9/2015    Stage 1A ovarian cancer     Diagnostic skin and sensitization tests 2/26/14 skin tests pos. 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    treated with RSO     Thyroid disease 10/2020    Hashimoto's disease       Past Surgical History:   Procedure Laterality Date     APPENDECTOMY  10/2/2015     BIOPSY  10/2/2015     COLONOSCOPY N/A 12/2/2015    Procedure: COLONOSCOPY;  Surgeon: Aaron Garrison MD;  Location:  GI     ESOPHAGOSCOPY, GASTROSCOPY, DUODENOSCOPY (EGD), COMBINED N/A 11/23/2015    Procedure: COMBINED ESOPHAGOSCOPY, GASTROSCOPY, DUODENOSCOPY (EGD), BIOPSY SINGLE OR MULTIPLE;  Surgeon: Babs Cassidy MD;  Location:  GI     GYN SURGERY  10/2/2015     LAPAROTOMY EXPLORATORY  10/2/15    Right salpingo-oophorectomy, omentectomy,  appendectomy, lymph node biopsies, cancer staging      LAPAROTOMY, STAGING, COMBINED N/A 10/2/2015    Procedure: COMBINED LAPAROTOMY, STAGING;  Surgeon: Duyen Thayer MD;  Location:  OR        Family History   Problem Relation Age of Onset     Diabetes Mother         gestational     Thyroid Disease Mother      Blood Disease Mother         anemia     Cancer - colorectal Mother 54     Colon Cancer Mother         Doing well     Cancer Maternal Grandmother      Blood Disease Maternal Grandmother         cervical     Respiratory Maternal Grandmother         emphysema     Diabetes Maternal Grandfather              Cerebrovascular Disease Maternal Grandfather      Cancer Maternal Grandfather         kidney     Breast Cancer Paternal Grandmother              Diabetes Paternal Grandfather              Cerebrovascular Disease Paternal Grandfather      Skin Cancer Paternal Grandfather      Heart Disease Father 56        had angioplasty     Coronary Artery Disease Father         Had angioplasty in 2014     Substance Abuse Father         Has abused alcohol since before I was born     Mental Illness Sister         Borderline personality disorder       Social History     Socioeconomic History     Marital status:      Spouse name: Not on file     Number of children: Not on file     Years of education: Not on file     Highest education level: Not on file   Occupational History     Not on file   Tobacco Use     Smoking status: Never Smoker     Smokeless tobacco: Never Used     Tobacco comment: never smoked   Vaping Use     Vaping Use: Never used   Substance and Sexual Activity     Alcohol use: Yes     Comment: Socially     Drug use: Never     Sexual activity: Yes     Partners: Male     Birth control/protection: Male Surgical     Comment:  had vasectomy   Other Topics Concern     Parent/sibling w/ CABG, MI or angioplasty before 65F 55M? No   Social History Narrative     Not on file      Social Determinants of Health     Financial Resource Strain: Not on file   Food Insecurity: Not on file   Transportation Needs: Not on file   Physical Activity: Not on file   Stress: Not on file   Social Connections: Not on file   Intimate Partner Violence: Not At Risk     Fear of Current or Ex-Partner: No     Emotionally Abused: No     Physically Abused: No     Sexually Abused: No   Housing Stability: Not on file       Outpatient Encounter Medications as of 3/2/2022   Medication Sig Dispense Refill     Cholecalciferol (VITAMIN D-3 PO)        FLUoxetine (PROZAC) 20 MG capsule Take 1 capsule (20 mg) by mouth daily After stopping the venlaxafine 37.5 mg start the fluoxetine 20 mg daily 30 capsule 3     LACTOBACILLUS PO 50 billion       levothyroxine (SYNTHROID/LEVOTHROID) 50 MCG tablet Take 1 tablet (50 mcg) by mouth daily 90 tablet 3     levothyroxine (SYNTHROID/LEVOTHROID) 75 MCG tablet Take 0.5 tablets (37.5 mcg) by mouth daily 15 tablet 11     liothyronine (CYTOMEL) 5 MCG tablet Take 1 tablet (5 mcg) by mouth daily 30 tablet 11     loratadine (CLARITIN) 10 MG tablet Take 1 tablet (10 mg) by mouth daily 30 tablet 1     MULTIPLE VITAMINS PO        naproxen (NAPROSYN) 250 MG tablet Take 1 tablet (250 mg) by mouth 2 times daily (with meals) 20 tablet 0     NONFORMULARY 40 billion CFU S. Bouladarii (probiotic)       No facility-administered encounter medications on file as of 3/2/2022.       Review Of Systems:  Skin: eyelashes. keloid  Eyes: negative  Ears/Nose/Throat: negative  Respiratory: No shortness of breath, dyspnea on exertion, cough, or hemoptysis  Cardiovascular: negative  Gastrointestinal: negative  Genitourinary: negative  Musculoskeletal: negative  Neurologic: negative  Psychiatric: negative  Hematologic/Lymphatic/Immunologic: negative  Endocrine: negative      Objective:     /65   Pulse 93   SpO2 98%   Breastfeeding No   Eyes: Conjunctivae/lids: Normal   ENT: Lips:  Normal  MSK:  Normal  Cardiovascular: Peripheral edema none  Pulm: Breathing Normal  Neuro/Psych: Orientation: A/O x 3. Normal; Mood/Affect: Normal, NAD, WDWN  Pt accompanied by: self  Following areas examined: face, neck, abdomen, suprapubic. Pt wearing mask  Nation skin type:ii   Findings:  0.3mm area of non scarring hair loss on bilateral inferior eyelid margin, lateral aspect  Firm pink plaque on suprapubic  Assessment and Plan:     1) Hypotrichosis   Alopecia areata? No other areas of hair loss  Mechanical?  Begin lattise    2) keloid x 1  Disc il tac  IL TAC: PGACAC discussed.  Risks including but not limited to injection site reaction, bruising, no resolution.  All questions answered and entertained to patient s satisfaction.  Informed consent obtained.  IL TAC in concentration of 40mg/ml was injected ID to one plaque.  Total injected was  1 ml.  Patient tolerated without complications and given wound care instructions, including not to move product around.  Return in 4 weeks for follow-up and possible additional IL TAC. May need additional treatment. May not be effective.      Reviewed patient chart from 7/15/2019 derm.         It was a pleasure speaking with Kelley Serra today.       Follow up in prn/montly il tac

## 2022-03-02 NOTE — LETTER
3/2/2022         RE: Kelley Serra  [address] [facility] [address]        Dear Colleague,    Thank you for referring your patient, Kelley Serra, to the [facility]. Please see a copy of my visit note below.    HPI:  Kelley Serra is a 31 year old female patient here today for scar on abdomen. States developed scar after removal of ovarian cancer 2015 .  Patient states this has been present for years.  Patient reports the following symptoms: bothersome, rubs on clothing, bleeds, irritating .  Patient reports the following previous treatments: none.  Patient reports the following modifying factors: none.  Associated symptoms: none. Also notices loss of eyelashes on inferior eyelid margin. Falls out and then regrows. Does not pull hair. Has tried a topical from ophthalmology with no change.  Patient has no other skin complaints today.  Remainder of the HPI, Meds, PMH, Allergies, FH, and SH was reviewed in chart.      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 for:  dog(+)/DM/M only and only on intradermals--ie, minimally allergic     Asthma, mild intermittent     last inhaler usage >6 years ago (4/11)     Cancer (H) 9/2015    Stage 1A ovarian cancer     Diagnostic skin and sensitization tests 2/26/14 skin tests pos. 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    treated with RSO     Thyroid disease 10/2020    Hashimoto's disease       Past Surgical History:   Procedure Laterality Date     APPENDECTOMY  10/2/2015     BIOPSY  10/2/2015     COLONOSCOPY N/A 12/2/2015    Procedure: COLONOSCOPY;  Surgeon: Aaron Garrison MD;  Location:  GI     ESOPHAGOSCOPY, GASTROSCOPY, DUODENOSCOPY (EGD), COMBINED N/A 11/23/2015     Procedure: COMBINED ESOPHAGOSCOPY, GASTROSCOPY, DUODENOSCOPY (EGD), BIOPSY SINGLE OR MULTIPLE;  Surgeon: Babs Cassidy MD;  Location:  GI     GYN SURGERY  10/2/2015     LAPAROTOMY EXPLORATORY  10/2/15    Right salpingo-oophorectomy, omentectomy, appendectomy, lymph node biopsies, cancer staging      LAPAROTOMY, STAGING, COMBINED N/A 10/2/2015    Procedure: COMBINED LAPAROTOMY, STAGING;  Surgeon: Duyen Thayer MD;  Location:  OR        Family History   Problem Relation Age of Onset     Diabetes Mother         gestational     Thyroid Disease Mother      Blood Disease Mother         anemia     Cancer - colorectal Mother 54     Colon Cancer Mother         Doing well     Cancer Maternal Grandmother      Blood Disease Maternal Grandmother         cervical     Respiratory Maternal Grandmother         emphysema     Diabetes Maternal Grandfather              Cerebrovascular Disease Maternal Grandfather      Cancer Maternal Grandfather         kidney     Breast Cancer Paternal Grandmother              Diabetes Paternal Grandfather              Cerebrovascular Disease Paternal Grandfather      Skin Cancer Paternal Grandfather      Heart Disease Father 56        had angioplasty     Coronary Artery Disease Father         Had angioplasty in 2014     Substance Abuse Father         Has abused alcohol since before I was born     Mental Illness Sister         Borderline personality disorder       Social History     Socioeconomic History     Marital status:      Spouse name: Not on file     Number of children: Not on file     Years of education: Not on file     Highest education level: Not on file   Occupational History     Not on file   Tobacco Use     Smoking status: Never Smoker     Smokeless tobacco: Never Used     Tobacco comment: never smoked   Vaping Use     Vaping Use: Never used   Substance and Sexual Activity     Alcohol use: Yes     Comment: Socially     Drug  use: Never     Sexual activity: Yes     Partners: Male     Birth control/protection: Male Surgical     Comment:  had vasectomy   Other Topics Concern     Parent/sibling w/ CABG, MI or angioplasty before 65F 55M? No   Social History Narrative     Not on file     Social Determinants of Health     Financial Resource Strain: Not on file   Food Insecurity: Not on file   Transportation Needs: Not on file   Physical Activity: Not on file   Stress: Not on file   Social Connections: Not on file   Intimate Partner Violence: Not At Risk     Fear of Current or Ex-Partner: No     Emotionally Abused: No     Physically Abused: No     Sexually Abused: No   Housing Stability: Not on file       Outpatient Encounter Medications as of 3/2/2022   Medication Sig Dispense Refill     Cholecalciferol (VITAMIN D-3 PO)        FLUoxetine (PROZAC) 20 MG capsule Take 1 capsule (20 mg) by mouth daily After stopping the venlaxafine 37.5 mg start the fluoxetine 20 mg daily 30 capsule 3     LACTOBACILLUS PO 50 billion       levothyroxine (SYNTHROID/LEVOTHROID) 50 MCG tablet Take 1 tablet (50 mcg) by mouth daily 90 tablet 3     levothyroxine (SYNTHROID/LEVOTHROID) 75 MCG tablet Take 0.5 tablets (37.5 mcg) by mouth daily 15 tablet 11     liothyronine (CYTOMEL) 5 MCG tablet Take 1 tablet (5 mcg) by mouth daily 30 tablet 11     loratadine (CLARITIN) 10 MG tablet Take 1 tablet (10 mg) by mouth daily 30 tablet 1     MULTIPLE VITAMINS PO        naproxen (NAPROSYN) 250 MG tablet Take 1 tablet (250 mg) by mouth 2 times daily (with meals) 20 tablet 0     NONFORMULARY 40 billion CFU S. Bouladarii (probiotic)       No facility-administered encounter medications on file as of 3/2/2022.       Review Of Systems:  Skin: eyelashes. keloid  Eyes: negative  Ears/Nose/Throat: negative  Respiratory: No shortness of breath, dyspnea on exertion, cough, or hemoptysis  Cardiovascular: negative  Gastrointestinal: negative  Genitourinary: negative  Musculoskeletal:  negative  Neurologic: negative  Psychiatric: negative  Hematologic/Lymphatic/Immunologic: negative  Endocrine: negative      Objective:     /65   Pulse 93   SpO2 98%   Breastfeeding No   Eyes: Conjunctivae/lids: Normal   ENT: Lips:  Normal  MSK: Normal  Cardiovascular: Peripheral edema none  Pulm: Breathing Normal  Neuro/Psych: Orientation: A/O x 3. Normal; Mood/Affect: Normal, NAD, WDWN  Pt accompanied by: self  Following areas examined: face, neck, abdomen, suprapubic. Pt wearing mask  Nation skin type:ii   Findings:  0.3mm area of non scarring hair loss on bilateral inferior eyelid margin, lateral aspect  Firm pink plaque on suprapubic  Assessment and Plan:     1) Hypotrichosis   Alopecia areata? No other areas of hair loss  Mechanical?  Begin lattise    2) keloid x 1  Disc il tac  IL TAC: PGACAC discussed.  Risks including but not limited to injection site reaction, bruising, no resolution.  All questions answered and entertained to patient s satisfaction.  Informed consent obtained.  IL TAC in concentration of 40mg/ml was injected ID to one plaque.  Total injected was  1 ml.  Patient tolerated without complications and given wound care instructions, including not to move product around.  Return in 4 weeks for follow-up and possible additional IL TAC. May need additional treatment. May not be effective.      Reviewed patient chart from 7/15/2019 derm.         It was a pleasure speaking with Kelley Serra today.       Follow up in prn/montly il tac        Again, thank you for allowing me to participate in the care of your patient.        Sincerely,        Romina Strauss PA-C

## 2022-03-06 ENCOUNTER — MYC MEDICAL ADVICE (OUTPATIENT)
Dept: FAMILY MEDICINE | Facility: CLINIC | Age: 32
End: 2022-03-06
Payer: COMMERCIAL

## 2022-03-07 ENCOUNTER — TELEPHONE (OUTPATIENT)
Dept: DERMATOLOGY | Facility: CLINIC | Age: 32
End: 2022-03-07
Payer: COMMERCIAL

## 2022-03-07 NOTE — TELEPHONE ENCOUNTER
Prior Authorization needed     Bimatoprost 0.03% OPH SOL W/Brushes     [facility]   Tel: [phone]  Fax: [phone]    Thank you  Anuradha Ibarra

## 2022-03-11 NOTE — TELEPHONE ENCOUNTER
Central Prior Authorization Team   Phone: [phone]      PA Initiation    Medication: latisse-Initiated  Insurance Company: [employer] ([facility]) - Phone [phone] Fax [phone]  Pharmacy Filling the Rx: [id number] [place], [place] - [facility] [address] AT [address] OF [facility]  Filling Pharmacy Phone: [phone]  Filling Pharmacy Fax:    Start Date: 3/11/2022

## 2022-03-14 NOTE — TELEPHONE ENCOUNTER
PRIOR AUTHORIZATION DENIED    Medication: latisse-DENIED    Denial Date: 3/11/2022    Denial Rational: Patient must have a history of trial & failure to the formulary alternative(s) or have a contraindication or intolerance to the formulary alternatives:        Brand Latisse is a plan exclusion so that is not covered.              Appeal Information:     If provider would like to appeal please provide a letter of medical necessity stating why formulary alternatives would not be clinically appropriate for patient and route back to the PA team.

## 2022-03-15 NOTE — TELEPHONE ENCOUNTER
Last Read in [employer]   3/15/2022  9:03 AM by Kelley Serra  [employer] message sent:    Jareth Moore-    The Latisse Jocelynn prescribed for you is not covered by your insurance and the prior authorization was denied.    You can pay out of pocket for this or use a goodrx coupon.    Let me know if you have any questions,    YAZ Damico-BSN-PHN  [facility]  [phone]

## 2022-03-16 ENCOUNTER — TELEPHONE (OUTPATIENT)
Dept: DERMATOLOGY | Facility: CLINIC | Age: 32
End: 2022-03-16
Payer: COMMERCIAL

## 2022-03-16 DIAGNOSIS — L65.9 HYPOTRICHOSIS: ICD-10-CM

## 2022-03-16 RX ORDER — BIMATOPROST 3 UG/ML
1 SOLUTION TOPICAL AT BEDTIME
Qty: 5 ML | Refills: 3 | Status: SHIPPED | OUTPATIENT
Start: 2022-03-16 | End: 2022-08-25

## 2022-03-16 NOTE — TELEPHONE ENCOUNTER
Followup: [facility] waiting for their medication Bimatoprost 0.03% Solution     [facility]   Tel: [phone]    Thank You   Anuradha Ibarra

## 2022-04-06 ENCOUNTER — MYC MEDICAL ADVICE (OUTPATIENT)
Dept: FAMILY MEDICINE | Facility: CLINIC | Age: 32
End: 2022-04-06
Payer: COMMERCIAL

## 2022-04-06 DIAGNOSIS — F43.22 ADJUSTMENT DISORDER WITH ANXIETY: ICD-10-CM

## 2022-04-06 NOTE — TELEPHONE ENCOUNTER
One month supply sent 3/7/22.  Due for follow up  Last OV 10/8/21    sharing[employer] message sent to patient asking her to schedule appointment.    Brandie Fan RN

## 2022-04-06 NOTE — TELEPHONE ENCOUNTER
Scheduled patient for VV with LS 4/12.  Patient has enough medication to get her through until appointment.    Brandie Fan RN

## 2022-04-10 NOTE — PROGRESS NOTES
Oscar is a 31 year old who is being evaluated via a billable video visit.      How would you like to obtain your AVS? MyChart  If the video visit is dropped, the invitation should be resent by: Text to cell phone: [phone]  Will anyone else be joining your video visit? No    Video Start Time: 2:54 pm     Assessment & Plan     Adjustment disorder with anxiety  We discussed current medication ( Prozac ) and dose seems to be working well for her , no side effects , feels much better ever since she switched from Effexor , doing well , and want to continue , refilled   - EMOTIONAL / BEHAVIORAL ASSESSMENT  - FLUoxetine (PROZAC) 20 MG capsule; TAKE ONE CAPSULE BY MOUTH DAILY  Recheck in 6 months   Acquired hypothyroidism  Sees jason and is on the half of the 75 mcg pill or 37.5 mcg daily , seems that is working well and last TSh check was within goal     Hashimoto's thyroiditis  As above     Dry eyes  Is better now , no concerns                    No follow-ups on file.    Ariadne Jimenez MD  [facility]   Oscar is a 31 year old who presents for the following health issues     History of Present Illness       Mental Health Follow-up:  Patient presents to follow-up on Depression & Anxiety.Patient's depression since last visit has been:  Good  The patient is not having other symptoms associated with depression.  Patient's anxiety since last visit has been:  Good  The patient is not having other symptoms associated with anxiety.  Any significant life events: No  Patient is not feeling anxious or having panic attacks.  Patient has no concerns about alcohol or drug use.       Today's PHQ-9         PHQ-9 Total Score: 1  PHQ-9 Q9 Thoughts of better off dead/self-harm past 2 weeks :   (P) Not at all    How difficult have these problems made it for you to do your work, take care of things at home, or get along with other people: Not difficult at all    Today's JOHN PAUL-7 Score: 0    She eats 4 or more  servings of fruits and vegetables daily.She consumes 0 sweetened beverage(s) daily.She exercises with enough effort to increase her heart rate 20 to 29 minutes per day.  She exercises with enough effort to increase her heart rate 3 or less days per week.   She is taking medications regularly.   right sided costochondritis which she took some Naproxen but now is off it     Anxiety Follow-Up- now much better since switching form Effexor to Prozac 20 mg , no side effects and anxiety is well controlled     How are you doing with your anxiety since your last visit? No change    Are you having other symptoms that might be associated with anxiety? No    Have you had a significant life event? No     Are you feeling depressed? No    Do you have any concerns with your use of alcohol or other drugs? No    Social History     Tobacco Use     Smoking status: Never Smoker     Smokeless tobacco: Never Used     Tobacco comment: never smoked   Vaping Use     Vaping Use: Never used   Substance Use Topics     Alcohol use: Yes     Comment: Socially     Drug use: Never     JOHN PAUL-7 SCORE 10/7/2021 4/11/2022 4/12/2022   Total Score 1 (minimal anxiety) - 0 (minimal anxiety)   Total Score 1 0 0     PHQ 10/7/2021 4/11/2022 4/12/2022   PHQ-9 Total Score 2 1 1   Q9: Thoughts of better off dead/self-harm past 2 weeks Not at all Not at all Not at all     Last PHQ-9 4/12/2022   1.  Little interest or pleasure in doing things 0   2.  Feeling down, depressed, or hopeless 0   3.  Trouble falling or staying asleep, or sleeping too much 1   4.  Feeling tired or having little energy 0   5.  Poor appetite or overeating 0   6.  Feeling bad about yourself 0   7.  Trouble concentrating 0   8.  Moving slowly or restless 0   Q9: Thoughts of better off dead/self-harm past 2 weeks 0   PHQ-9 Total Score 1   Difficulty at work, home, or with people -     JOHN PAUL-7  4/12/2022   1. Feeling nervous, anxious, or on edge 0   2. Not being able to stop or control worrying 0    3. Worrying too much about different things 0   4. Trouble relaxing 0   5. Being so restless that it is hard to sit still 0   6. Becoming easily annoyed or irritable 0   7. Feeling afraid, as if something awful might happen 0   JOHN PAUL-7 Total Score 0   If you checked any problems, how difficult have they made it for you to do your work, take care of things at home, or get along with other people? -     2) hypothyroidism , Hashimotos thyroiditis , she has seen endocrinology for this and currently she is on half of the 75 mcg or 37.5 mcg daily which seems to be working well for her         Review of Systems   Constitutional, HEENT, cardiovascular, pulmonary, GI, , musculoskeletal, neuro, skin, endocrine and psych systems are negative, except as otherwise noted.      Objective           Vitals:  No vitals were obtained today due to virtual visit.    Physical Exam   GENERAL: Healthy, alert and no distress  EYES: Eyes grossly normal to inspection.  No discharge or erythema, or obvious scleral/conjunctival abnormalities.  RESP: No audible wheeze, cough, or visible cyanosis.  No visible retractions or increased work of breathing.    SKIN: Visible skin clear. No significant rash, abnormal pigmentation or lesions.  NEURO: Cranial nerves grossly intact.  Mentation and speech appropriate for age.  PSYCH: Mentation appears normal, affect normal/bright, judgement and insight intact, normal speech and appearance well-groomed.    No results found for this or any previous visit (from the past 24 hour(s)).            Video-Visit Details    Type of service:  Video Visit    Video End Time:3:02 PM    Originating Location (pt. Location): Home    Distant Location (provider location):  [facility]     Platform used for Video Visit: [email or web address]

## 2022-04-11 ASSESSMENT — ANXIETY QUESTIONNAIRES
6. BECOMING EASILY ANNOYED OR IRRITABLE: NOT AT ALL
5. BEING SO RESTLESS THAT IT IS HARD TO SIT STILL: NOT AT ALL
3. WORRYING TOO MUCH ABOUT DIFFERENT THINGS: NOT AT ALL
7. FEELING AFRAID AS IF SOMETHING AWFUL MIGHT HAPPEN: NOT AT ALL
2. NOT BEING ABLE TO STOP OR CONTROL WORRYING: NOT AT ALL
4. TROUBLE RELAXING: NOT AT ALL
GAD7 TOTAL SCORE: 0
1. FEELING NERVOUS, ANXIOUS, OR ON EDGE: NOT AT ALL

## 2022-04-11 ASSESSMENT — PATIENT HEALTH QUESTIONNAIRE - PHQ9: SUM OF ALL RESPONSES TO PHQ QUESTIONS 1-9: 1

## 2022-04-12 ENCOUNTER — VIRTUAL VISIT (OUTPATIENT)
Dept: FAMILY MEDICINE | Facility: CLINIC | Age: 32
End: 2022-04-12
Payer: COMMERCIAL

## 2022-04-12 DIAGNOSIS — E03.9 ACQUIRED HYPOTHYROIDISM: ICD-10-CM

## 2022-04-12 DIAGNOSIS — F43.22 ADJUSTMENT DISORDER WITH ANXIETY: Primary | ICD-10-CM

## 2022-04-12 DIAGNOSIS — H04.123 DRY EYES: ICD-10-CM

## 2022-04-12 DIAGNOSIS — E06.3 HASHIMOTO'S THYROIDITIS: ICD-10-CM

## 2022-04-12 PROCEDURE — 96127 BRIEF EMOTIONAL/BEHAV ASSMT: CPT | Mod: GT | Performed by: FAMILY MEDICINE

## 2022-04-12 PROCEDURE — 99214 OFFICE O/P EST MOD 30 MIN: CPT | Mod: GT | Performed by: FAMILY MEDICINE

## 2022-04-12 ASSESSMENT — ANXIETY QUESTIONNAIRES
7. FEELING AFRAID AS IF SOMETHING AWFUL MIGHT HAPPEN: NOT AT ALL
4. TROUBLE RELAXING: NOT AT ALL
1. FEELING NERVOUS, ANXIOUS, OR ON EDGE: NOT AT ALL
6. BECOMING EASILY ANNOYED OR IRRITABLE: NOT AT ALL
5. BEING SO RESTLESS THAT IT IS HARD TO SIT STILL: NOT AT ALL
3. WORRYING TOO MUCH ABOUT DIFFERENT THINGS: NOT AT ALL
GAD7 TOTAL SCORE: 0
7. FEELING AFRAID AS IF SOMETHING AWFUL MIGHT HAPPEN: NOT AT ALL
GAD7 TOTAL SCORE: 0
2. NOT BEING ABLE TO STOP OR CONTROL WORRYING: NOT AT ALL
GAD7 TOTAL SCORE: 0

## 2022-04-12 ASSESSMENT — PATIENT HEALTH QUESTIONNAIRE - PHQ9
10. IF YOU CHECKED OFF ANY PROBLEMS, HOW DIFFICULT HAVE THESE PROBLEMS MADE IT FOR YOU TO DO YOUR WORK, TAKE CARE OF THINGS AT HOME, OR GET ALONG WITH OTHER PEOPLE: NOT DIFFICULT AT ALL
SUM OF ALL RESPONSES TO PHQ QUESTIONS 1-9: 1
SUM OF ALL RESPONSES TO PHQ QUESTIONS 1-9: 1

## 2022-06-27 DIAGNOSIS — L65.9 ALOPECIA: Primary | ICD-10-CM

## 2022-06-27 RX ORDER — PIMECROLIMUS 10 MG/G
CREAM TOPICAL 2 TIMES DAILY
Qty: 30 G | Refills: 0 | Status: SHIPPED | OUTPATIENT
Start: 2022-06-27

## 2022-06-28 ENCOUNTER — TELEPHONE (OUTPATIENT)
Dept: DERMATOLOGY | Facility: CLINIC | Age: 32
End: 2022-06-28

## 2022-06-28 NOTE — TELEPHONE ENCOUNTER
Prior Authorization Specialty Medication Request    Medication/Dose: Pimecrolimus 1% cream   ICD code (if different than what is on RX):    Previously Tried and Failed:      Important Lab Values:   Rationale:     Insurance Name:   Insurance ID:   Insurance Phone Number:     Pharmacy Information (if different than what is on RX)  Name:    Phone:        Thank you   Anuradha Ibarra [facility]

## 2022-06-29 NOTE — TELEPHONE ENCOUNTER
Prior Authorization Approval    Authorization Effective Date: 6/29/2022  Authorization Expiration Date: 6/29/2023  Medication: Pimecrolimus 1% cream   Approved Dose/Quantity:    Reference #:     Insurance Company: [employer] ([facility]) - Phone [phone] Fax [phone]  Expected CoPay:       CoPay Card Available:      Foundation Assistance Needed:    Which Pharmacy is filling the prescription (Not needed for infusion/clinic administered): [id number] [place] [address] & [facility]  Pharmacy Notified: Yes  Patient Notified: Yes  **Instructed pharmacy to notify patient when script is ready to /ship.**

## 2022-06-29 NOTE — TELEPHONE ENCOUNTER
Central Prior Authorization Team   Phone: [phone]    PA Initiation    Medication: Pimecrolimus 1% cream   Insurance Company: [employer] ([facility]) - Phone [phone] Fax [phone]  Pharmacy Filling the Rx: [id number] [place] [address] & [facility]  Filling Pharmacy Phone: [phone]  Filling Pharmacy Fax:    Start Date: 6/29/2022

## 2022-07-04 NOTE — TELEPHONE ENCOUNTER
Prescription approved per [facility] Refill Protocol or patient Primary care provider (PCP)  SIMEON Hayward RN/Hermes Tai         98.3

## 2022-08-10 ENCOUNTER — MYC MEDICAL ADVICE (OUTPATIENT)
Dept: FAMILY MEDICINE | Facility: CLINIC | Age: 32
End: 2022-08-10

## 2022-08-25 ENCOUNTER — OFFICE VISIT (OUTPATIENT)
Dept: FAMILY MEDICINE | Facility: CLINIC | Age: 32
End: 2022-08-25
Payer: COMMERCIAL

## 2022-08-25 VITALS
HEIGHT: 63 IN | DIASTOLIC BLOOD PRESSURE: 72 MMHG | WEIGHT: 123.6 LBS | RESPIRATION RATE: 16 BRPM | BODY MASS INDEX: 21.9 KG/M2 | SYSTOLIC BLOOD PRESSURE: 106 MMHG | OXYGEN SATURATION: 98 % | TEMPERATURE: 98.1 F | HEART RATE: 81 BPM

## 2022-08-25 DIAGNOSIS — E06.3 HASHIMOTO'S THYROIDITIS: ICD-10-CM

## 2022-08-25 DIAGNOSIS — R22.1 NECK MASS: Primary | ICD-10-CM

## 2022-08-25 DIAGNOSIS — R61 NIGHT SWEATS: ICD-10-CM

## 2022-08-25 DIAGNOSIS — D35.1 PARATHYROID ADENOMA: ICD-10-CM

## 2022-08-25 LAB
BASOPHILS # BLD AUTO: 0.1 10E3/UL (ref 0–0.2)
BASOPHILS NFR BLD AUTO: 1 %
EOSINOPHIL # BLD AUTO: 0.4 10E3/UL (ref 0–0.7)
EOSINOPHIL NFR BLD AUTO: 7 %
ERYTHROCYTE [DISTWIDTH] IN BLOOD BY AUTOMATED COUNT: 13.1 % (ref 10–15)
HCT VFR BLD AUTO: 42.3 % (ref 35–47)
HGB BLD-MCNC: 14 G/DL (ref 11.7–15.7)
IMM GRANULOCYTES # BLD: 0 10E3/UL
IMM GRANULOCYTES NFR BLD: 0 %
LYMPHOCYTES # BLD AUTO: 1.6 10E3/UL (ref 0.8–5.3)
LYMPHOCYTES NFR BLD AUTO: 26 %
MCH RBC QN AUTO: 29.2 PG (ref 26.5–33)
MCHC RBC AUTO-ENTMCNC: 33.1 G/DL (ref 31.5–36.5)
MCV RBC AUTO: 88 FL (ref 78–100)
MONOCYTES # BLD AUTO: 0.5 10E3/UL (ref 0–1.3)
MONOCYTES NFR BLD AUTO: 8 %
NEUTROPHILS # BLD AUTO: 3.6 10E3/UL (ref 1.6–8.3)
NEUTROPHILS NFR BLD AUTO: 58 %
PLATELET # BLD AUTO: 233 10E3/UL (ref 150–450)
RBC # BLD AUTO: 4.8 10E6/UL (ref 3.8–5.2)
RETICS # AUTO: 0.06 10E6/UL (ref 0.03–0.1)
RETICS/RBC NFR AUTO: 1.3 % (ref 0.5–2)
T4 FREE SERPL-MCNC: 1.1 NG/DL (ref 0.76–1.46)
TSH SERPL DL<=0.005 MIU/L-ACNC: 2.02 MU/L (ref 0.4–4)
WBC # BLD AUTO: 6.1 10E3/UL (ref 4–11)

## 2022-08-25 PROCEDURE — 84439 ASSAY OF FREE THYROXINE: CPT | Performed by: INTERNAL MEDICINE

## 2022-08-25 PROCEDURE — 84100 ASSAY OF PHOSPHORUS: CPT | Performed by: INTERNAL MEDICINE

## 2022-08-25 PROCEDURE — 82565 ASSAY OF CREATININE: CPT | Performed by: INTERNAL MEDICINE

## 2022-08-25 PROCEDURE — 84460 ALANINE AMINO (ALT) (SGPT): CPT | Performed by: INTERNAL MEDICINE

## 2022-08-25 PROCEDURE — 36415 COLL VENOUS BLD VENIPUNCTURE: CPT | Performed by: INTERNAL MEDICINE

## 2022-08-25 PROCEDURE — 82310 ASSAY OF CALCIUM: CPT | Performed by: INTERNAL MEDICINE

## 2022-08-25 PROCEDURE — 85025 COMPLETE CBC W/AUTO DIFF WBC: CPT | Performed by: INTERNAL MEDICINE

## 2022-08-25 PROCEDURE — 85045 AUTOMATED RETICULOCYTE COUNT: CPT | Performed by: INTERNAL MEDICINE

## 2022-08-25 PROCEDURE — 99214 OFFICE O/P EST MOD 30 MIN: CPT | Performed by: INTERNAL MEDICINE

## 2022-08-25 PROCEDURE — 84443 ASSAY THYROID STIM HORMONE: CPT | Performed by: INTERNAL MEDICINE

## 2022-08-25 PROCEDURE — 86140 C-REACTIVE PROTEIN: CPT | Performed by: INTERNAL MEDICINE

## 2022-08-25 ASSESSMENT — PAIN SCALES - GENERAL: PAINLEVEL: NO PAIN (0)

## 2022-08-25 NOTE — PATIENT INSTRUCTIONS
At [facility], we strive to deliver an exceptional experience to you, every time we see you. If you receive a survey, please complete it as we do value your feedback.  If you have MyChart, you can expect to receive results automatically within 24 hours of their completion.  Your provider will send a note interpreting your results as well.   If you do not have MyChart, you should receive your results in about a week by mail.    Your care team:                            Family Medicine Internal Medicine   MD Mustapha Henson MD Shantel Branch-Fleming, MD Srinivasa Vaka, MD Katya Belousova, CLAUDIA Chacon CNP, MD ([facility]   Andrew Durán, MD Cristela Jaimes MD Amelia Massimini APRN CNP Kim Thein, APRN CNP Bethany Templen, MD             Clinic hours: Monday - Thursday 7 am-6 pm; Fridays 7 am-5 pm.   Urgent care: Monday - Friday 10 am- 8 pm; Saturday and Sunday 9 am-5 pm.    Clinic: [phone]       [facility]: Monday - Thursday 8 am - 7 pm; Friday 8 am - 6 pm  [facility]: [phone]

## 2022-08-25 NOTE — PROGRESS NOTES
[facility] Internal Medicine Progress Note           Assessment and Plan:     Neck mass  - Lab Blood Morphology Pathologist Review  - CT Soft Tissue Neck w Contrast  - US Thyroid; Future  - Calcium; Future  - Creatinine  - Calcium    Hashimoto's thyroiditis  - TSH  - T4, free  - US Thyroid; Future    Night sweats  - Lab Blood Morphology Pathologist Review  - CRP, inflammation  - ALT  - Phosphorus; Future  - Phosphorus           Interval History:   This is a 32 year old female who comes in today for the following reason:    Reason for visit: neck concerns  Onset: subacute  Description: sensation of a neck mass  Course: same  Associated symptoms: fatigue, night sweats and sensation of mass in the right axilla, but patient denies any unintentional weight loss, loss of appetite nor change in bowel habits.  History: no  Evaluation: no  Precipitating factor: Hashimoto's thyroiditis  Alleviating factor: none  Complications: none  Therapies tried and outcome: none            Significant Problems:   Patient Active Problem List   Diagnosis     Esophageal reflux     Benign neoplasm of skin     CARDIOVASCULAR SCREENING; LDL GOAL LESS THAN 160     Lactose intolerance     Allergy to mold spores     House dust mite allergy     Allergic rhinitis due to animal dander     Diagnostic skin and sensitization tests     Family history of colon cancer     Vitamin D deficiency     JOHN PAUL (generalized anxiety disorder)     Family history of thyroid disease in mother     Ovarian tumor of borderline malignancy, right     Adjustment disorder with anxiety     Acquired hypothyroidism     Night sweats     Dry eyes     Fatigue, unspecified type     Hashimoto's thyroiditis              Review of Systems:   CONSTITUTIONAL:POSITIVE  for fatigue and NEGATIVE  for anorexia, chills and malaise  INTEGUMENTARY/SKIN: NEGATIVE for worrisome rashes, moles or lesions  EYES: NEGATIVE for vision changes or irritation  ENT/MOUTH: NEGATIVE for ear pain,  "earache bilateral, enlarged tonsils bilateral, epistaxis, nasal congestion and postnasal drainage  RESP: NEGATIVE for significant cough or SOB  BREAST: NEGATIVE for masses, tenderness or discharge  CV: NEGATIVE for chest pain, palpitations or peripheral edema  GI: NEGATIVE for nausea, abdominal pain, heartburn, or change in bowel habits  : NEGATIVE for frequency, dysuria, or hematuria  MUSCULOSKELETAL: NEGATIVE for significant arthralgias or myalgia  NEURO: NEGATIVE for weakness, dizziness or paresthesias  ENDOCRINE: POSITIVE  for Hashimoto's thyroiditis.  HEME: NEGATIVE for bleeding problems  PSYCHIATRIC: NEGATIVE for changes in mood or affect             Physical Exam:   /72 (BP Location: Left arm, Patient Position: Sitting, Cuff Size: Adult Small)   Pulse 81   Temp 98.1  F (36.7  C) (Tympanic)   Resp 16   Ht 1.6 m (5' 3\")   Wt 56.1 kg (123 lb 9.6 oz)   LMP 08/13/2022 (Exact Date)   SpO2 98%   BMI 21.89 kg/m    Constitutional: Awake, alert, cooperative, no apparent distress, and appears stated age.  Eyes: extra-ocular muscles intact and sclera clear  ENT: normocepalic, without obvious abnormality  Hematologic / Lymphatic: cervical lymphadenopathy noted on anterior neck. and no axillary lymphadenopathy  Lungs: no increased work of breathing and no retractions  Musculoskeletal: no lower extremity pitting edema present  Neurologic: Mental Status Exam:  Level of Alertness:   awake  Orientation:   person, place, time  Memory:   normal  Fund of Knowledge:  normal  Attention/Concentration:  normal  Language:  normal  Neuropsychiatric: Normal affect, mood, orientation, memory and insight.          Data:   Epic reviewed.     Disposition:  Follow-up in 4 weeks.      Mustapha Gamez MD  Internal Medicine  [facility] Team  "

## 2022-08-26 LAB
PATH REPORT.COMMENTS IMP SPEC: NORMAL
PATH REPORT.FINAL DX SPEC: NORMAL
PATH REPORT.MICROSCOPIC SPEC OTHER STN: NORMAL
PATH REPORT.MICROSCOPIC SPEC OTHER STN: NORMAL

## 2022-08-29 ENCOUNTER — ANCILLARY PROCEDURE (OUTPATIENT)
Dept: ULTRASOUND IMAGING | Facility: CLINIC | Age: 32
End: 2022-08-29
Payer: COMMERCIAL

## 2022-08-29 DIAGNOSIS — E06.3 HASHIMOTO'S THYROIDITIS: ICD-10-CM

## 2022-08-29 LAB
ALT SERPL W P-5'-P-CCNC: 14 U/L (ref 0–50)
CRP SERPL-MCNC: <2.9 MG/L (ref 0–8)

## 2022-08-29 PROCEDURE — 76536 US EXAM OF HEAD AND NECK: CPT | Mod: TC | Performed by: RADIOLOGY

## 2022-08-31 ENCOUNTER — DOCUMENTATION ONLY (OUTPATIENT)
Dept: LAB | Facility: CLINIC | Age: 32
End: 2022-08-31

## 2022-08-31 LAB
CALCIUM SERPL-MCNC: 9.4 MG/DL (ref 8.5–10.1)
CREAT SERPL-MCNC: 0.7 MG/DL (ref 0.52–1.04)
GFR SERPL CREATININE-BSD FRML MDRD: >90 ML/MIN/1.73M2
PHOSPHATE SERPL-MCNC: 4.3 MG/DL (ref 2.5–4.5)

## 2022-08-31 NOTE — PROGRESS NOTES
Patient had labs drawn on 8/25/2022 and a PTHI was ordered today. That test/specimen needs to be processed within 2 hours of collection and since it was drawn 6 days ago we are unable to add on this test. If this is needed, patient will need to be redrawn.     Ramonita Stout on 8/31/2022 at 8:54 AM

## 2022-09-02 ENCOUNTER — ANCILLARY PROCEDURE (OUTPATIENT)
Dept: CT IMAGING | Facility: CLINIC | Age: 32
End: 2022-09-02
Attending: INTERNAL MEDICINE
Payer: COMMERCIAL

## 2022-09-02 PROCEDURE — 70491 CT SOFT TISSUE NECK W/DYE: CPT | Performed by: RADIOLOGY

## 2022-09-02 RX ORDER — IOPAMIDOL 755 MG/ML
100 INJECTION, SOLUTION INTRAVASCULAR ONCE
Status: COMPLETED | OUTPATIENT
Start: 2022-09-02 | End: 2022-09-02

## 2022-09-02 RX ADMIN — IOPAMIDOL 100 ML: 755 INJECTION, SOLUTION INTRAVASCULAR at 10:09

## 2022-09-07 DIAGNOSIS — E06.3 HASHIMOTO'S THYROIDITIS: ICD-10-CM

## 2022-09-07 NOTE — TELEPHONE ENCOUNTER
Requested Prescriptions   Pending Prescriptions Disp Refills     levothyroxine (SYNTHROID/LEVOTHROID) 75 MCG tablet 15 tablet 11     Sig: Take 0.5 tablets (37.5 mcg) by mouth daily       There is no refill protocol information for this order          [facility]  Store number [id number]  Fax:  [phone]  Ph:  [phone]      Last office visit: Visit date not found with prescribing provider:  Dr. Mcpherson    Future Office Visit:          Gaby Soria  [facility]       Male

## 2022-09-09 RX ORDER — LEVOTHYROXINE SODIUM 75 UG/1
37.5 TABLET ORAL DAILY
Qty: 45 TABLET | Refills: 0 | Status: SHIPPED | OUTPATIENT
Start: 2022-09-09 | End: 2022-11-02

## 2022-09-15 ENCOUNTER — MYC MEDICAL ADVICE (OUTPATIENT)
Dept: FAMILY MEDICINE | Facility: CLINIC | Age: 32
End: 2022-09-15

## 2022-09-15 DIAGNOSIS — F43.22 ADJUSTMENT DISORDER WITH ANXIETY: ICD-10-CM

## 2022-09-15 NOTE — TELEPHONE ENCOUNTER
Rx sent 4/12/22 for #90 with 1 refill.  Called [facility]  Should have 1 refill left for #30 but for some reason Rx was closed.    Refill sent for one month.    [employer] message sent to patient, also asked her to schedule physical with ELIZ Fan RN

## 2022-11-02 ENCOUNTER — OFFICE VISIT (OUTPATIENT)
Dept: FAMILY MEDICINE | Facility: CLINIC | Age: 32
End: 2022-11-02
Payer: COMMERCIAL

## 2022-11-02 VITALS
RESPIRATION RATE: 16 BRPM | OXYGEN SATURATION: 100 % | TEMPERATURE: 98.7 F | SYSTOLIC BLOOD PRESSURE: 102 MMHG | BODY MASS INDEX: 22.11 KG/M2 | HEART RATE: 77 BPM | HEIGHT: 63 IN | WEIGHT: 124.8 LBS | DIASTOLIC BLOOD PRESSURE: 68 MMHG

## 2022-11-02 DIAGNOSIS — Z00.00 ROUTINE GENERAL MEDICAL EXAMINATION AT A HEALTH CARE FACILITY: Primary | ICD-10-CM

## 2022-11-02 DIAGNOSIS — F43.22 ADJUSTMENT DISORDER WITH ANXIETY: ICD-10-CM

## 2022-11-02 DIAGNOSIS — E06.3 HASHIMOTO'S THYROIDITIS: ICD-10-CM

## 2022-11-02 DIAGNOSIS — D50.9 IRON DEFICIENCY ANEMIA, UNSPECIFIED IRON DEFICIENCY ANEMIA TYPE: ICD-10-CM

## 2022-11-02 LAB
ERYTHROCYTE [DISTWIDTH] IN BLOOD BY AUTOMATED COUNT: 13.5 % (ref 10–15)
HCT VFR BLD AUTO: 40.4 % (ref 35–47)
HGB BLD-MCNC: 13.7 G/DL (ref 11.7–15.7)
MCH RBC QN AUTO: 29.8 PG (ref 26.5–33)
MCHC RBC AUTO-ENTMCNC: 33.9 G/DL (ref 31.5–36.5)
MCV RBC AUTO: 88 FL (ref 78–100)
PLATELET # BLD AUTO: 239 10E3/UL (ref 150–450)
RBC # BLD AUTO: 4.6 10E6/UL (ref 3.8–5.2)
WBC # BLD AUTO: 6.9 10E3/UL (ref 4–11)

## 2022-11-02 PROCEDURE — 82728 ASSAY OF FERRITIN: CPT | Performed by: FAMILY MEDICINE

## 2022-11-02 PROCEDURE — 90472 IMMUNIZATION ADMIN EACH ADD: CPT | Performed by: FAMILY MEDICINE

## 2022-11-02 PROCEDURE — 90471 IMMUNIZATION ADMIN: CPT | Performed by: FAMILY MEDICINE

## 2022-11-02 PROCEDURE — 90715 TDAP VACCINE 7 YRS/> IM: CPT | Performed by: FAMILY MEDICINE

## 2022-11-02 PROCEDURE — 96127 BRIEF EMOTIONAL/BEHAV ASSMT: CPT | Performed by: FAMILY MEDICINE

## 2022-11-02 PROCEDURE — 85027 COMPLETE CBC AUTOMATED: CPT | Performed by: FAMILY MEDICINE

## 2022-11-02 PROCEDURE — 82306 VITAMIN D 25 HYDROXY: CPT | Performed by: FAMILY MEDICINE

## 2022-11-02 PROCEDURE — 36415 COLL VENOUS BLD VENIPUNCTURE: CPT | Performed by: FAMILY MEDICINE

## 2022-11-02 PROCEDURE — 99395 PREV VISIT EST AGE 18-39: CPT | Mod: 25 | Performed by: FAMILY MEDICINE

## 2022-11-02 PROCEDURE — 99214 OFFICE O/P EST MOD 30 MIN: CPT | Mod: 25 | Performed by: FAMILY MEDICINE

## 2022-11-02 PROCEDURE — 90686 IIV4 VACC NO PRSV 0.5 ML IM: CPT | Performed by: FAMILY MEDICINE

## 2022-11-02 RX ORDER — LEVOTHYROXINE SODIUM 75 UG/1
37.5 TABLET ORAL DAILY
Qty: 45 TABLET | Refills: 1 | Status: SHIPPED | OUTPATIENT
Start: 2022-11-02 | End: 2023-05-04

## 2022-11-02 ASSESSMENT — ENCOUNTER SYMPTOMS
HEMATOCHEZIA: 0
DIZZINESS: 0
JOINT SWELLING: 0
DYSURIA: 0
PALPITATIONS: 0
DIARRHEA: 0
NAUSEA: 0
CHILLS: 0
MYALGIAS: 1
HEMATURIA: 0
SHORTNESS OF BREATH: 0
WEAKNESS: 0
COUGH: 0
HEARTBURN: 0
HEADACHES: 1
SORE THROAT: 0
CONSTIPATION: 0
ARTHRALGIAS: 1
BREAST MASS: 0
ABDOMINAL PAIN: 1
EYE PAIN: 0
FREQUENCY: 0
PARESTHESIAS: 1
NERVOUS/ANXIOUS: 0
FEVER: 0

## 2022-11-02 ASSESSMENT — PATIENT HEALTH QUESTIONNAIRE - PHQ9
SUM OF ALL RESPONSES TO PHQ QUESTIONS 1-9: 2
SUM OF ALL RESPONSES TO PHQ QUESTIONS 1-9: 2
10. IF YOU CHECKED OFF ANY PROBLEMS, HOW DIFFICULT HAVE THESE PROBLEMS MADE IT FOR YOU TO DO YOUR WORK, TAKE CARE OF THINGS AT HOME, OR GET ALONG WITH OTHER PEOPLE: NOT DIFFICULT AT ALL

## 2022-11-02 ASSESSMENT — PAIN SCALES - GENERAL: PAINLEVEL: NO PAIN (0)

## 2022-11-02 NOTE — PROGRESS NOTES
SUBJECTIVE:   CC: Oscar is an 32 year old who presents for preventive health visit.       Patient has been advised of split billing requirements and indicates understanding: Yes  Healthy Habits:     Getting at least 3 servings of Calcium per day:  Yes    Bi-annual eye exam:  Yes    Dental care twice a year:  Yes    Sleep apnea or symptoms of sleep apnea:  None    Diet:  Vegetarian/vegan    Frequency of exercise:  2-3 days/week    Duration of exercise:  30-45 minutes    Taking medications regularly:  Yes    Medication side effects:  None    PHQ-2 Total Score: 0    Additional concerns today:  No  Answers for HPI/ROS submitted by the patient on 11/2/2022  If you checked off any problems, how difficult have these problems made it for you to do your work, take care of things at home, or get along with other people?: Not difficult at all  PHQ9 TOTAL SCORE: 2        1) anxiety  2) Hashimotis thyroiditis   3) hx of iron deficiency anemia , not taking iron now     Today's PHQ-2 Score:   PHQ-2 ( 1999 [employer]) 11/2/2022   Q1: Little interest or pleasure in doing things 0   Q2: Feeling down, depressed or hopeless 0   PHQ-2 Score 0   PHQ-2 Total Score (12-17 Years)- Positive if 3 or more points; Administer PHQ-A if positive -   Q1: Little interest or pleasure in doing things Not at all   Q2: Feeling down, depressed or hopeless Not at all   PHQ-2 Score 0       Abuse: Current or Past (Physical, Sexual or Emotional) - No  Do you feel safe in your environment? Yes    Have you ever done Advance Care Planning? (For example, a Health Directive, POLST, or a discussion with a medical provider or your loved ones about your wishes): No, advance care planning information given to patient to review.  Patient plans to discuss their wishes with loved ones or provider.      Social History     Tobacco Use     Smoking status: Never     Smokeless tobacco: Never     Tobacco comments:     never smoked   Substance Use Topics     Alcohol use: Yes      Comment: Socially     If you drink alcohol do you typically have >3 drinks per day or >7 drinks per week? No    Alcohol Use 11/2/2022   Prescreen: >3 drinks/day or >7 drinks/week? No   Prescreen: >3 drinks/day or >7 drinks/week? -   No flowsheet data found.    Reviewed orders with patient.  Reviewed health maintenance and updated orders accordingly - Yes  Lab work is in process  Labs reviewed in EPIC  BP Readings from Last 3 Encounters:   11/02/22 102/68   08/25/22 106/72   03/02/22 103/65    Wt Readings from Last 3 Encounters:   11/02/22 56.6 kg (124 lb 12.8 oz)   08/25/22 56.1 kg (123 lb 9.6 oz)   01/12/22 58.3 kg (128 lb 8 oz)                  Patient Active Problem List   Diagnosis     Esophageal reflux     Benign neoplasm of skin     CARDIOVASCULAR SCREENING; LDL GOAL LESS THAN 160     Lactose intolerance     Allergy to mold spores     House dust mite allergy     Allergic rhinitis due to animal dander     Diagnostic skin and sensitization tests     Family history of colon cancer     Vitamin D deficiency     JOHN PAUL (generalized anxiety disorder)     Family history of thyroid disease in mother     Ovarian tumor of borderline malignancy, right     Adjustment disorder with anxiety     Acquired hypothyroidism     Night sweats     Dry eyes     Fatigue, unspecified type     Hashimoto's thyroiditis     Past Surgical History:   Procedure Laterality Date     APPENDECTOMY  10/2/2015     BIOPSY  10/2/2015     COLONOSCOPY N/A 12/2/2015    Procedure: COLONOSCOPY;  Surgeon: Aaron Garrison MD;  Location:  GI     ESOPHAGOSCOPY, GASTROSCOPY, DUODENOSCOPY (EGD), COMBINED N/A 11/23/2015    Procedure: COMBINED ESOPHAGOSCOPY, GASTROSCOPY, DUODENOSCOPY (EGD), BIOPSY SINGLE OR MULTIPLE;  Surgeon: Babs Cassidy MD;  Location:  GI     GYN SURGERY  10/2/2015     LAPAROTOMY EXPLORATORY  10/2/15    Right salpingo-oophorectomy, omentectomy, appendectomy, lymph node biopsies, cancer staging      LAPAROTOMY,  STAGING, COMBINED N/A 10/2/2015    Procedure: COMBINED LAPAROTOMY, STAGING;  Surgeon: Duyen Thayer MD;  Location:  OR       Social History     Tobacco Use     Smoking status: Never     Smokeless tobacco: Never     Tobacco comments:     never smoked   Substance Use Topics     Alcohol use: Yes     Comment: Socially     Family History   Problem Relation Age of Onset     Diabetes Mother         Gestational diabates when pregnant with me     Thyroid Disease Mother      Blood Disease Mother         anemia     Cancer - colorectal Mother 54     Colon Cancer Mother         Doing well     Cancer Maternal Grandmother      Blood Disease Maternal Grandmother         cervical     Respiratory Maternal Grandmother         emphysema     Diabetes Maternal Grandfather              Cerebrovascular Disease Maternal Grandfather      Cancer Maternal Grandfather         kidney     Breast Cancer Paternal Grandmother              Diabetes Paternal Grandfather              Cerebrovascular Disease Paternal Grandfather      Skin Cancer Paternal Grandfather      Heart Disease Father 56        had angioplasty     Coronary Artery Disease Father         Had angioplasty in 2014, and two additional angioplasties in      Substance Abuse Father         Has abused alcohol since before I was born     Mental Illness Sister         Borderline personality disorder         Current Outpatient Medications   Medication Sig Dispense Refill     FLUoxetine (PROZAC) 20 MG capsule TAKE 1 CAPSULE BY MOUTH DAILY 90 capsule 1     levothyroxine (SYNTHROID/LEVOTHROID) 75 MCG tablet Take 0.5 tablets (37.5 mcg) by mouth daily 45 tablet 1     Cholecalciferol (VITAMIN D-3 PO)        LACTOBACILLUS PO 50 billion       loratadine (CLARITIN) 10 MG tablet Take 1 tablet (10 mg) by mouth daily 30 tablet 1     MULTIPLE VITAMINS PO        pimecrolimus (ELIDEL) 1 % external cream Apply topically 2 times daily To affected area on lower lash  line. 30 g 0     Allergies   Allergen Reactions     Cleocin Rash     Keflex [Cephalexin Monohydrate] Rash     Zoloft      Recent Labs   Lab Test 08/25/22  0914 01/12/22  1034 05/28/21  1501 02/04/21  1023 10/06/20  1423 04/29/19  0850   LDL  --   --   --   --   --  72   HDL  --   --   --   --   --  72   TRIG  --   --   --   --   --  79   ALT 14  --   --  20  --  12   CR 0.70  --   --  0.76  --  0.75   GFRESTIMATED >90  --   --  >90  --  >90   GFRESTBLACK  --   --   --  >90  --  >90   POTASSIUM  --   --   --  4.0  --  3.7   TSH 2.02 1.67   < >  --    < > 1.58    < > = values in this interval not displayed.        Breast Cancer Screening:    FHS-7: No flowsheet data found.    Patient under 40 years of age: Routine Mammogram Screening not recommended.   Pertinent mammograms are reviewed under the imaging tab.    History of abnormal Pap smear: NO - age 30- 65 PAP every 3 years recommended  PAP / HPV Latest Ref Rng & Units 11/22/2021 10/30/2019 8/1/2016   PAP   Negative for Intraepithelial Lesion or Malignancy (NILM) - -   PAP (Historical) - - NIL NIL   HPV16 Negative Negative Negative -   HPV18 Negative Negative Negative -   HRHPV Negative Negative Negative -     Reviewed and updated as needed this visit by clinical staff   Tobacco  Allergies  Meds   Med Hx  Surg Hx  Fam Hx  Soc Hx        Reviewed and updated as needed this visit by Provider                 Past Medical History:   Diagnosis Date     Allergic rhinitis due to animal dander      Allergy to mold spores     2/26/14 skin tests pos. for:  dog(+)/DM/M only and only on intradermals--ie, minimally allergic     Asthma, mild intermittent     last inhaler usage >6 years ago (4/11)     Cancer (H) 9/2015    Stage 1A ovarian cancer     Diagnostic skin and sensitization tests 2/26/14 skin tests pos. for:  dog(+)/DM/M only and only on intradermals--ie, minimally allergic     Esophageal reflux      House dust mite allergy      Neoplasm of borderline malignancy of  right ovary 10/2/15    treated with RSO     Thyroid disease 10/2020    Hashimoto's disease      Past Surgical History:   Procedure Laterality Date     APPENDECTOMY  10/2/2015     BIOPSY  10/2/2015     COLONOSCOPY N/A 12/2/2015    Procedure: COLONOSCOPY;  Surgeon: Aaron Garrison MD;  Location:  GI     ESOPHAGOSCOPY, GASTROSCOPY, DUODENOSCOPY (EGD), COMBINED N/A 11/23/2015    Procedure: COMBINED ESOPHAGOSCOPY, GASTROSCOPY, DUODENOSCOPY (EGD), BIOPSY SINGLE OR MULTIPLE;  Surgeon: Babs Cassidy MD;  Location:  GI     GYN SURGERY  10/2/2015     LAPAROTOMY EXPLORATORY  10/2/15    Right salpingo-oophorectomy, omentectomy, appendectomy, lymph node biopsies, cancer staging      LAPAROTOMY, STAGING, COMBINED N/A 10/2/2015    Procedure: COMBINED LAPAROTOMY, STAGING;  Surgeon: Duyen Thayer MD;  Location:  OR       Review of Systems   Constitutional: Negative for chills and fever.   HENT: Negative for congestion, ear pain, hearing loss and sore throat.    Eyes: Negative for pain and visual disturbance.   Respiratory: Negative for cough and shortness of breath.    Cardiovascular: Negative for chest pain, palpitations and peripheral edema.   Gastrointestinal: Positive for abdominal pain. Negative for constipation, diarrhea, heartburn, hematochezia and nausea.   Breasts:  Negative for tenderness, breast mass and discharge.   Genitourinary: Negative for dysuria, frequency, genital sores, hematuria, pelvic pain, urgency, vaginal bleeding and vaginal discharge.   Musculoskeletal: Positive for arthralgias and myalgias. Negative for joint swelling.   Skin: Negative for rash.   Neurological: Positive for headaches and paresthesias. Negative for dizziness and weakness.   Psychiatric/Behavioral: Negative for mood changes. The patient is not nervous/anxious.      CONSTITUTIONAL: NEGATIVE for fever, chills, change in weight  INTEGUMENTARU/SKIN: NEGATIVE for worrisome rashes, moles or lesions  EYES:  "NEGATIVE for vision changes or irritation  ENT: NEGATIVE for ear, mouth and throat problems  RESP: NEGATIVE for significant cough or SOB  BREAST: NEGATIVE for masses, tenderness or discharge  CV: NEGATIVE for chest pain, palpitations or peripheral edema  GI: NEGATIVE for nausea, abdominal pain, heartburn, or change in bowel habits  : NEGATIVE for unusual urinary or vaginal symptoms. Periods are regular.  MUSCULOSKELETAL: NEGATIVE for significant arthralgias or myalgia  NEURO: NEGATIVE for weakness, dizziness or paresthesias  PSYCHIATRIC: NEGATIVE for changes in mood or affect     OBJECTIVE:   /68   Pulse 77   Temp 98.7  F (37.1  C) (Tympanic)   Resp 16   Ht 1.607 m (5' 3.25\")   Wt 56.6 kg (124 lb 12.8 oz)   LMP 11/02/2022 (Exact Date)   SpO2 100%   BMI 21.93 kg/m    Physical Exam  GENERAL: healthy, alert and no distress  EYES: Eyes grossly normal to inspection, PERRL and conjunctivae and sclerae normal  HENT: ear canals and TM's normal, nose and mouth without ulcers or lesions  NECK: no adenopathy, no asymmetry, masses, or scars and thyroid normal to palpation  RESP: lungs clear to auscultation - no rales, rhonchi or wheezes  BREAST: normal without masses, tenderness or nipple discharge and no palpable axillary masses or adenopathy  CV: regular rate and rhythm, normal S1 S2, no S3 or S4, no murmur, click or rub, no peripheral edema and peripheral pulses strong  ABDOMEN: soft, nontender, no hepatosplenomegaly, no masses and bowel sounds normal   (female): normal female external genitalia, normal urethral meatus, vaginal mucosa pink, moist, well rugated, and normal cervix/adnexa/uterus without masses or discharge  MS: no gross musculoskeletal defects noted, no edema  SKIN: no suspicious lesions or rashes  NEURO: Normal strength and tone, mentation intact and speech normal  PSYCH: mentation appears normal, affect normal/bright    Diagnostic Test Results:  Labs reviewed in Epic  Results for orders " placed or performed in visit on 11/02/22   Ferritin     Status: Normal   Result Value Ref Range    Ferritin 17 12 - 150 ng/mL   CBC with platelets     Status: Normal   Result Value Ref Range    WBC Count 6.9 4.0 - 11.0 10e3/uL    RBC Count 4.60 3.80 - 5.20 10e6/uL    Hemoglobin 13.7 11.7 - 15.7 g/dL    Hematocrit 40.4 35.0 - 47.0 %    MCV 88 78 - 100 fL    MCH 29.8 26.5 - 33.0 pg    MCHC 33.9 31.5 - 36.5 g/dL    RDW 13.5 10.0 - 15.0 %    Platelet Count 239 150 - 450 10e3/uL   Vitamin D Deficiency     Status: Normal   Result Value Ref Range    Vitamin D, Total (25-Hydroxy) 25 20 - 75 ug/L    Narrative    Season, race, dietary intake, and treatment affect the concentration of 25-hydroxy-Vitamin D. Values may decrease during winter months and increase during summer months. Values 20-29 ug/L may indicate Vitamin D insufficiency and values <20 ug/L may indicate Vitamin D deficiency.    Vitamin D determination is routinely performed by an immunoassay specific for 25 hydroxyvitamin D3.  If an individual is on vitamin D2(ergocalciferol) supplementation, please specify 25 OH vitamin D2 and D3 level determination by LCMSMS test VITD23.         ASSESSMENT/PLAN:   (Z00.00) Routine general medical examination at a health care facility  (primary encounter diagnosis)  Comment: Discussed diet,calcium,exercise.Went over self breast exam.Thin prep was done.Eyes and teeth UTD.No immunizations needed today.See orders below for tests ordered and screening needed.    Plan: Vitamin D Deficiency        Will check Vit D levels     (F43.22) Adjustment disorder with anxiety  Comment: she has been stable on the Prozac and at 20 mg dose   Plan: FLUoxetine (PROZAC) 20 MG capsule        No side effects , helps with her anxiety and wishes to continue     (E06.3) Hashimoto's thyroiditis  Comment: refilled , doing well on the thyroid medicine , currently is at 75 mcg daily   Plan: levothyroxine (SYNTHROID/LEVOTHROID) 75 MCG         tablet         "Stable on this dose and recently has checked TSH - normal     (D50.9) Iron deficiency anemia, unspecified iron deficiency anemia type  Comment: has been on iron but not currently and will check her iron levels today   Plan: Ferritin, CBC with platelets        As above and also ferritin       Patient has been advised of split billing requirements and indicates understanding: Yes      COUNSELING:  Reviewed preventive health counseling, as reflected in patient instructions       Regular exercise       Healthy diet/nutrition       Vision screening       Contraception    Estimated body mass index is 21.93 kg/m  as calculated from the following:    Height as of this encounter: 1.607 m (5' 3.25\").    Weight as of this encounter: 56.6 kg (124 lb 12.8 oz).        She reports that she has never smoked. She has never used smokeless tobacco.      Counseling Resources:  ATP IV Guidelines  Pooled Cohorts Equation Calculator  Breast Cancer Risk Calculator  BRCA-Related Cancer Risk Assessment: FHS-7 Tool  FRAX Risk Assessment  ICSI Preventive Guidelines  Dietary Guidelines for Americans, 2010  USDA's MyPlate  ASA Prophylaxis  Lung CA Screening    Ariadne Jimenez MD  [facility]  "

## 2022-11-02 NOTE — NURSING NOTE
Prior to immunization administration, verified patients identity using patient s name and date of birth. Please see Immunization Activity for additional information.     Screening Questionnaire for Adult Immunization    Are you sick today?   No   Do you have allergies to medications, food, a vaccine component or latex?   No   Have you ever had a serious reaction after receiving a vaccination?   No   Do you have a long-term health problem with heart, lung, kidney, or metabolic disease (e.g., diabetes), asthma, a blood disorder, no spleen, complement component deficiency, a cochlear implant, or a spinal fluid leak?  Are you on long-term aspirin therapy?   No   Do you have cancer, leukemia, HIV/AIDS, or any other immune system problem?   No   Do you have a parent, brother, or sister with an immune system problem?   No   In the past 3 months, have you taken medications that affect  your immune system, such as prednisone, other steroids, or anticancer drugs; drugs for the treatment of rheumatoid arthritis, Crohn s disease, or psoriasis; or have you had radiation treatments?   No   Have you had a seizure, or a brain or other nervous system problem?   No   During the past year, have you received a transfusion of blood or blood    products, or been given immune (gamma) globulin or antiviral drug?   No   For women: Are you pregnant or is there a chance you could become       pregnant during the next month?   No   Have you received any vaccinations in the past 4 weeks?   No     Immunization questionnaire answers were all negative.        Per orders of Dr. Jimenez, injection of Flu, TDAP given by Sravanthi Oliveira RN. Patient instructed to remain in clinic for 15 minutes afterwards, and to report any adverse reaction to me immediately.       Screening performed by Sravanthi Oliveira RN on 11/2/2022 at 2:17 PM.

## 2022-11-03 LAB
DEPRECATED CALCIDIOL+CALCIFEROL SERPL-MC: 25 UG/L (ref 20–75)
FERRITIN SERPL-MCNC: 17 NG/ML (ref 12–150)

## 2023-02-20 ASSESSMENT — ENCOUNTER SYMPTOMS
ARTHRALGIAS: 1
WEIGHT GAIN: 0
POLYPHAGIA: 0
WEIGHT LOSS: 0
JOINT SWELLING: 0
FATIGUE: 1
MYALGIAS: 1
STIFFNESS: 0
ALTERED TEMPERATURE REGULATION: 0
MUSCLE CRAMPS: 1
NECK PAIN: 0
POLYDIPSIA: 0
BACK PAIN: 0
INCREASED ENERGY: 0
NIGHT SWEATS: 1
MUSCLE WEAKNESS: 0
HALLUCINATIONS: 0
DECREASED APPETITE: 0
FEVER: 0
CHILLS: 0

## 2023-02-27 ENCOUNTER — OFFICE VISIT (OUTPATIENT)
Dept: ENDOCRINOLOGY | Facility: CLINIC | Age: 33
End: 2023-02-27
Attending: INTERNAL MEDICINE
Payer: COMMERCIAL

## 2023-02-27 VITALS
DIASTOLIC BLOOD PRESSURE: 59 MMHG | WEIGHT: 129.9 LBS | SYSTOLIC BLOOD PRESSURE: 92 MMHG | BODY MASS INDEX: 22.83 KG/M2 | HEART RATE: 85 BPM | OXYGEN SATURATION: 98 %

## 2023-02-27 DIAGNOSIS — D35.1 PARATHYROID ADENOMA: ICD-10-CM

## 2023-02-27 DIAGNOSIS — E06.3 HASHIMOTO'S THYROIDITIS: Primary | ICD-10-CM

## 2023-02-27 LAB
ALBUMIN SERPL-MCNC: 4.2 G/DL (ref 3.4–5)
CALCIUM SERPL-MCNC: 9 MG/DL (ref 8.5–10.1)
PHOSPHATE SERPL-MCNC: 3.5 MG/DL (ref 2.5–4.5)
PTH-INTACT SERPL-MCNC: 22 PG/ML (ref 15–65)
T4 FREE SERPL-MCNC: 0.92 NG/DL (ref 0.76–1.46)
TSH SERPL DL<=0.005 MIU/L-ACNC: 1.79 MU/L (ref 0.4–4)

## 2023-02-27 PROCEDURE — 82310 ASSAY OF CALCIUM: CPT | Performed by: INTERNAL MEDICINE

## 2023-02-27 PROCEDURE — 36415 COLL VENOUS BLD VENIPUNCTURE: CPT | Performed by: INTERNAL MEDICINE

## 2023-02-27 PROCEDURE — 84100 ASSAY OF PHOSPHORUS: CPT | Performed by: INTERNAL MEDICINE

## 2023-02-27 PROCEDURE — 82040 ASSAY OF SERUM ALBUMIN: CPT | Performed by: INTERNAL MEDICINE

## 2023-02-27 PROCEDURE — 82306 VITAMIN D 25 HYDROXY: CPT | Performed by: INTERNAL MEDICINE

## 2023-02-27 PROCEDURE — 84443 ASSAY THYROID STIM HORMONE: CPT | Performed by: INTERNAL MEDICINE

## 2023-02-27 PROCEDURE — 83970 ASSAY OF PARATHORMONE: CPT | Performed by: INTERNAL MEDICINE

## 2023-02-27 PROCEDURE — 84439 ASSAY OF FREE THYROXINE: CPT | Performed by: INTERNAL MEDICINE

## 2023-02-27 PROCEDURE — 99203 OFFICE O/P NEW LOW 30 MIN: CPT | Performed by: INTERNAL MEDICINE

## 2023-02-27 NOTE — PROGRESS NOTES
Endocrinology Note         Kelley is a 32 year old female presents today for possible parathyroid adenoma .    HPI  Kelley is a 32 year old female with hx of Hashimoto's thyroiditis presents today for possible parathyroid adenoma from ultrasound    She was previously seen  in 2021 for Hashimoto's thyroiditis  She has been on levothyroxine 37.5 mcg daily without problem. TSH 2.02 in August 2022.    The reason for consult today is the findings from ultrasound thyroid. She did have 2 ultrasound thyroid done in 2020 and 2023. It was done initially when she felt tightness in her throat.  It is gone away by itself.    Ultrasound thyroid in 2020 showed seven small hypoechoic nodules adjacent to the thyroid gland. These have appearances suggestive of mildly prominent lymph nodes, although differential considerations also include parathyroid adenoma. No thyroid nodules or masses are identified.    Repeat ultrasound thyroid in 2022 showed 5 small hypoechoic nodules inferior to the thyroid gland, with the largest measuring 1.1 cm. These may again represent small cervical lymph nodes, although presence of parathyroid adenomas cannot be excluded.    Upon asking she has had fatigue for a long time.  She had had night sweats and brain fog.  Had difficulty concentrations and trouble numbering words.  She has regular menstrual cycle.  She has never pregnant.  She denies having kidney stone or family history of kidney stone.  She has never had fracture.  She denies family history of osteoporosis.  She denied muscle ache or pain or muscle cramp.  She denied constipation.  She is taking vitamin D 5000 IU daily.  She is not taking any calcium supplements or dairy products due to lactose intolerance.     Usually exercise with stationary bike or yoga.    Past Medical History  Past Medical History:   Diagnosis Date     Allergic rhinitis due to animal dander      Allergy to mold spores     2/26/14 skin tests pos. for:   dog(+)/DM/M only and only on intradermals--ie, minimally allergic     Asthma, mild intermittent     last inhaler usage >6 years ago (4/11)     Cancer (H) 9/2015    Stage 1A ovarian cancer     Diagnostic skin and sensitization tests 2/26/14 skin tests pos. 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    treated with RSO     Thyroid disease 10/2020    Hashimoto's disease       Allergies  Allergies   Allergen Reactions     Cleocin Rash     Keflex [Cephalexin Monohydrate] Rash     Zoloft      Medications  Current Outpatient Medications   Medication Sig Dispense Refill     Cholecalciferol (VITAMIN D-3 PO)        FLUoxetine (PROZAC) 20 MG capsule TAKE 1 CAPSULE BY MOUTH DAILY 90 capsule 1     LACTOBACILLUS PO 50 billion       levothyroxine (SYNTHROID/LEVOTHROID) 75 MCG tablet Take 0.5 tablets (37.5 mcg) by mouth daily 45 tablet 1     loratadine (CLARITIN) 10 MG tablet Take 1 tablet (10 mg) by mouth daily 30 tablet 1     MULTIPLE VITAMINS PO        pimecrolimus (ELIDEL) 1 % external cream Apply topically 2 times daily To affected area on lower lash line. 30 g 0     Family History  family history includes Blood Disease in her maternal grandmother and mother; Breast Cancer in her paternal grandmother; Cancer in her maternal grandfather and maternal grandmother; Cancer - colorectal (age of onset: 54) in her mother; Cerebrovascular Disease in her maternal grandfather and paternal grandfather; Colon Cancer in her mother; Coronary Artery Disease in her father; Diabetes in her maternal grandfather, mother, and paternal grandfather; Heart Disease (age of onset: 56) in her father; Mental Illness in her sister; Respiratory in her maternal grandmother; Skin Cancer in her paternal grandfather; Substance Abuse in her father; Thyroid Disease in her mother.   Family history of kidney stone,  osteoporosis or fracture    Social History  Social History     Tobacco Use     Smoking status: Never     Smokeless tobacco: Never     Tobacco comments:     never smoked   Vaping Use     Vaping Use: Never used   Substance Use Topics     Alcohol use: Yes     Comment: Socially     Drug use: Never   She is not smoking not drinking alcohol  She is a writer.    ROS  10 points ROS were negative otherwise mentioned in HPI      Physical Exam  BP 92/59 (BP Location: Left arm, Patient Position: Sitting, Cuff Size: Adult Regular)   Pulse 85   Wt 58.9 kg (129 lb 14.4 oz)   SpO2 98%   BMI 22.83 kg/m    Body mass index is 22.83 kg/m .  Constitutional: no distress, comfortable, pleasant   Eyes: anicteric, normal extra-ocular movements, no lid lag or retraction  Neck: no thyromegaly, no discrete nodule  Cardiovascular: regular rate and rhythm, normal S1 and S2, no murmurs  Respiratory: clear to auscultation, no wheezes or crackles, normal breath sounds   Gastrointestinal:  nontender, no hepatomegaly, no masses   Musculoskeletal: no edema   Skin: no concerning lesions, no jaundice   Neurological: cranial nerves intact, 2+ reflexes at patella, normal gait, no tremor on outstretched hands bilaterally  Psychological: appropriate mood   Lymphatic: no cervical  lymphadenopathy.      RESULTS  I have personally reviewed labs and images. I also reviewed labs with patient and discussed the result and plan of care.        US thyroid 10/26/2020  FINDINGS:  RIGHT lobe: 5.1 x 1.3 x 1.7 cm. Homogeneous echotexture.  Isthmus: 3 mm.  LEFT lobe: 4.8 x 1.1 x 1.1 cm. Homogeneous echotexture.     NODULES: No discrete thyroid nodules or masses are seen.    There are at least seven hypoechoic small nodules adjacent to the thyroid gland. The largest of these is located posterior to the mid right thyroid lobe, and measures 1 x 0.5 x 0.6 cm. Many of these  nodules demonstrate areas of central hyperechogenicity, suggesting the presence of a fatty hilum.                                                                    IMPRESSION:  1.  No thyroid nodules or masses are identified.  2.  There are seven small hypoechoic nodules adjacent to the thyroid gland. These have appearances suggestive of mildly prominent lymph nodes, although differential considerations also include parathyroid  Adenoma.    US neck 10/29/2021  FINDINGS: Nonenlarged normal morphology lymph nodes seen bilaterally. There is no evidence for cervical adenopathy bilaterally by size or morphology.                                                                   IMPRESSION: Negative soft tissue neck study.    US thyroid 8/29/2022  FINDINGS:  RIGHT lobe: 4.4 x 1 x 1.7 cm. Homogeneous echotexture.  Isthmus: 2 mm.  LEFT lobe: 4.5 x 1 x 0.9 cm. Homogeneous echotexture.     NODULES: No discrete thyroid nodules or masses are identified.     There are 5 small hypoechoic nodules inferior to the thyroid gland, with the largest on the right measuring 0.7 x 0.7 x 1.1 cm. Previously, a total of 7 small hypoechoic nodules were noted.                                                                   IMPRESSION:  1.  No discrete thyroid nodules or masses.  2.  There are 5 small hypoechoic nodules inferior to the thyroid gland, with the largest measuring 1.1 cm. These may again represent small cervical lymph nodes, although presence of parathyroid adenomas  cannot be excluded.    ASSESSMENT:    Kelley is a 32 year old female with hx of Hashimoto's thyroiditis presents today for possible parathyroid adenoma    1) multiple hypoechoic nodules inferior to the thyroid gland: She initially had ultrasound thyroid to evaluate for feeling of tightness in her throat in 2020.  Ultrasound thyroid showed several small hypoechoic nodules 3 to thyroid glands which may represent small cervical lymph node or parathyroid adenoma could not be excluded.  Repeat ultrasound in 2022 showed multiple hypoechoic nodules inferior to the  thyroid gland.  -Based on my review of the the ultrasound, think this is likely reactive lymphadenopathy secondary to Hashimoto thyroiditis.  Less likely hyperthyroid or adenoma.  She has not had hypercalcemia or hyperparathyroidism.  I have discussed with her about my interpretation.  More aggressive approach would be biopsy with PTH needle wash.  However I do not think that we need to go for FNA because her calcium and PTH have been normal.  -Recommend to recheck labs calcium, albumin, phosphorus, PTH today and f/up ultrasound in 2 years    2) Hashimoto's thyroiditis: currently on 2037.5 mcg daily.  Her TFT has been normal.  Recommend continue current dose.    PLAN:   -Recheck labs calcium, albumin, phosphorus, PTH, TSH, free T4  -Recommend to repeat lab in 1 year and ultrasound in 2 years    External notes/medical records independently reviewed, labs and imaging independently reviewed, medical management and tests to be discussed/communicated to patient.    Time: I spent 36 minutes spent on the date of the encounter preparing to see patient (including chart review and preparation), obtaining and or reviewing additional medical history, performing a physical exam and evaluation, documenting clinical information in the electronic health record, independently interpreting results, communicating results to the patient and coordinating care.    Amish Shah MD  Division of Diabetes and Endocrinology  Department of Medicine  Pager #[phone]

## 2023-02-27 NOTE — NURSING NOTE
Kelley Serra's goals for this visit include:   Chief Complaint   Patient presents with     Consult     Endocrine Problem     Parathyroid adenoma, fatigue, brain fog, night sweats     She requests these members of her care team be copied on today's visit information: Yes    PCP: Ariadne Jimenez    Referring Provider:  Mustapha Gamez MD  [id number]  [place]  [place] [id number]    BP 92/59 (BP Location: Left arm, Patient Position: Sitting, Cuff Size: Adult Regular)   Pulse 85   Wt 58.9 kg (129 lb 14.4 oz)   SpO2 98%   BMI 22.83 kg/m      Do you need any medication refills at today's visit? No

## 2023-02-27 NOTE — LETTER
2/27/2023         RE: Kelley Serra  [address] [facility] [address]        Dear Colleague,    Thank you for referring your patient, Kelley Serra, to the [facility]. Please see a copy of my visit note below.         Endocrinology Note         Kelley is a 32 year old female presents today for possible parathyroid adenoma .    HPI  Kelley is a 32 year old female with hx of Hashimoto's thyroiditis presents today for possible parathyroid adenoma from ultrasound    She was previously seen  in 2021 for Hashimoto's thyroiditis  She has been on levothyroxine 37.5 mcg daily without problem. TSH 2.02 in August 2022.    The reason for consult today is the findings from ultrasound thyroid. She did have 2 ultrasound thyroid done in 2020 and 2023. It was done initially when she felt tightness in her throat.  It is gone away by itself.    Ultrasound thyroid in 2020 showed seven small hypoechoic nodules adjacent to the thyroid gland. These have appearances suggestive of mildly prominent lymph nodes, although differential considerations also include parathyroid adenoma. No thyroid nodules or masses are identified.    Repeat ultrasound thyroid in 2022 showed 5 small hypoechoic nodules inferior to the thyroid gland, with the largest measuring 1.1 cm. These may again represent small cervical lymph nodes, although presence of parathyroid adenomas cannot be excluded.    Upon asking she has had fatigue for a long time.  She had had night sweats and brain fog.  Had difficulty concentrations and trouble numbering words.  She has regular menstrual cycle.  She has never pregnant.  She denies having kidney stone or family history of kidney stone.  She has never had fracture.  She denies family history of osteoporosis.  She denied muscle ache or pain or muscle cramp.  She denied constipation.  She is taking vitamin D 5000 IU daily.  She is not taking any calcium supplements or  dairy products due to lactose intolerance.     Usually exercise with stationary bike or yoga.    Past Medical History  Past Medical History:   Diagnosis Date     Allergic rhinitis due to animal dander      Allergy to mold spores     2/26/14 skin tests pos. for:  dog(+)/DM/M only and only on intradermals--ie, minimally allergic     Asthma, mild intermittent     last inhaler usage >6 years ago (4/11)     Cancer (H) 9/2015    Stage 1A ovarian cancer     Diagnostic skin and sensitization tests 2/26/14 skin tests pos. 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    treated with RSO     Thyroid disease 10/2020    Hashimoto's disease       Allergies  Allergies   Allergen Reactions     Cleocin Rash     Keflex [Cephalexin Monohydrate] Rash     Zoloft      Medications  Current Outpatient Medications   Medication Sig Dispense Refill     Cholecalciferol (VITAMIN D-3 PO)        FLUoxetine (PROZAC) 20 MG capsule TAKE 1 CAPSULE BY MOUTH DAILY 90 capsule 1     LACTOBACILLUS PO 50 billion       levothyroxine (SYNTHROID/LEVOTHROID) 75 MCG tablet Take 0.5 tablets (37.5 mcg) by mouth daily 45 tablet 1     loratadine (CLARITIN) 10 MG tablet Take 1 tablet (10 mg) by mouth daily 30 tablet 1     MULTIPLE VITAMINS PO        pimecrolimus (ELIDEL) 1 % external cream Apply topically 2 times daily To affected area on lower lash line. 30 g 0     Family History  family history includes Blood Disease in her maternal grandmother and mother; Breast Cancer in her paternal grandmother; Cancer in her maternal grandfather and maternal grandmother; Cancer - colorectal (age of onset: 54) in her mother; Cerebrovascular Disease in her maternal grandfather and paternal grandfather; Colon Cancer in her mother; Coronary Artery Disease in her father; Diabetes in her maternal grandfather, mother, and paternal grandfather;  Heart Disease (age of onset: 56) in her father; Mental Illness in her sister; Respiratory in her maternal grandmother; Skin Cancer in her paternal grandfather; Substance Abuse in her father; Thyroid Disease in her mother.   Family history of kidney stone, osteoporosis or fracture    Social History  Social History     Tobacco Use     Smoking status: Never     Smokeless tobacco: Never     Tobacco comments:     never smoked   Vaping Use     Vaping Use: Never used   Substance Use Topics     Alcohol use: Yes     Comment: Socially     Drug use: Never   She is not smoking not drinking alcohol  She is a writer.    ROS  10 points ROS were negative otherwise mentioned in HPI      Physical Exam  BP 92/59 (BP Location: Left arm, Patient Position: Sitting, Cuff Size: Adult Regular)   Pulse 85   Wt 58.9 kg (129 lb 14.4 oz)   SpO2 98%   BMI 22.83 kg/m    Body mass index is 22.83 kg/m .  Constitutional: no distress, comfortable, pleasant   Eyes: anicteric, normal extra-ocular movements, no lid lag or retraction  Neck: no thyromegaly, no discrete nodule  Cardiovascular: regular rate and rhythm, normal S1 and S2, no murmurs  Respiratory: clear to auscultation, no wheezes or crackles, normal breath sounds   Gastrointestinal:  nontender, no hepatomegaly, no masses   Musculoskeletal: no edema   Skin: no concerning lesions, no jaundice   Neurological: cranial nerves intact, 2+ reflexes at patella, normal gait, no tremor on outstretched hands bilaterally  Psychological: appropriate mood   Lymphatic: no cervical  lymphadenopathy.      RESULTS  I have personally reviewed labs and images. I also reviewed labs with patient and discussed the result and plan of care.        US thyroid 10/26/2020  FINDINGS:  RIGHT lobe: 5.1 x 1.3 x 1.7 cm. Homogeneous echotexture.  Isthmus: 3 mm.  LEFT lobe: 4.8 x 1.1 x 1.1 cm. Homogeneous echotexture.     NODULES: No discrete thyroid nodules or masses are seen.    There are at least seven hypoechoic small  nodules adjacent to the thyroid gland. The largest of these is located posterior to the mid right thyroid lobe, and measures 1 x 0.5 x 0.6 cm. Many of these  nodules demonstrate areas of central hyperechogenicity, suggesting the presence of a fatty hilum.                                                                   IMPRESSION:  1.  No thyroid nodules or masses are identified.  2.  There are seven small hypoechoic nodules adjacent to the thyroid gland. These have appearances suggestive of mildly prominent lymph nodes, although differential considerations also include parathyroid  Adenoma.    US neck 10/29/2021  FINDINGS: Nonenlarged normal morphology lymph nodes seen bilaterally. There is no evidence for cervical adenopathy bilaterally by size or morphology.                                                                   IMPRESSION: Negative soft tissue neck study.    US thyroid 8/29/2022  FINDINGS:  RIGHT lobe: 4.4 x 1 x 1.7 cm. Homogeneous echotexture.  Isthmus: 2 mm.  LEFT lobe: 4.5 x 1 x 0.9 cm. Homogeneous echotexture.     NODULES: No discrete thyroid nodules or masses are identified.     There are 5 small hypoechoic nodules inferior to the thyroid gland, with the largest on the right measuring 0.7 x 0.7 x 1.1 cm. Previously, a total of 7 small hypoechoic nodules were noted.                                                                   IMPRESSION:  1.  No discrete thyroid nodules or masses.  2.  There are 5 small hypoechoic nodules inferior to the thyroid gland, with the largest measuring 1.1 cm. These may again represent small cervical lymph nodes, although presence of parathyroid adenomas  cannot be excluded.    ASSESSMENT:    Kelley is a 32 year old female with hx of Hashimoto's thyroiditis presents today for possible parathyroid adenoma    1) multiple hypoechoic nodules inferior to the thyroid gland: She initially had ultrasound thyroid to evaluate for feeling of tightness in her throat in  2020.  Ultrasound thyroid showed several small hypoechoic nodules 3 to thyroid glands which may represent small cervical lymph node or parathyroid adenoma could not be excluded.  Repeat ultrasound in 2022 showed multiple hypoechoic nodules inferior to the thyroid gland.  -Based on my review of the the ultrasound, think this is likely reactive lymphadenopathy secondary to Hashimoto thyroiditis.  Less likely hyperthyroid or adenoma.  She has not had hypercalcemia or hyperparathyroidism.  I have discussed with her about my interpretation.  More aggressive approach would be biopsy with PTH needle wash.  However I do not think that we need to go for FNA because her calcium and PTH have been normal.  -Recommend to recheck labs calcium, albumin, phosphorus, PTH today and f/up ultrasound in 2 years    2) Hashimoto's thyroiditis: currently on 2037.5 mcg daily.  Her TFT has been normal.  Recommend continue current dose.    PLAN:   -Recheck labs calcium, albumin, phosphorus, PTH, TSH, free T4  -Recommend to repeat lab in 1 year and ultrasound in 2 years    External notes/medical records independently reviewed, labs and imaging independently reviewed, medical management and tests to be discussed/communicated to patient.    Time: I spent 36 minutes spent on the date of the encounter preparing to see patient (including chart review and preparation), obtaining and or reviewing additional medical history, performing a physical exam and evaluation, documenting clinical information in the electronic health record, independently interpreting results, communicating results to the patient and coordinating care.    Amish Shah MD  Division of Diabetes and Endocrinology  Department of Medicine  Pager #[phone]        Again, thank you for allowing me to participate in the care of your patient.        Sincerely,        Amish Shah MD

## 2023-02-27 NOTE — PATIENT INSTRUCTIONS
- lab today  - recommend to repeat lab again next year and ultrasound in 2 years    If you have any questions, please do not hesitate to call clinic line at [phone] and ask for Endocrinology clinic.  If you need to fax, please fax to clinic fax number at [phone]    After clinic hours or weekends, please contact [phone] and ask for Endocrinologist-on call      Sincerely,    Amish Shah MD  Endocrinology

## 2023-02-28 LAB — DEPRECATED CALCIDIOL+CALCIFEROL SERPL-MC: 28 UG/L (ref 20–75)

## 2023-10-03 ENCOUNTER — PATIENT OUTREACH (OUTPATIENT)
Dept: CARE COORDINATION | Facility: CLINIC | Age: 33
End: 2023-10-03
Payer: COMMERCIAL

## 2023-10-09 DIAGNOSIS — F43.22 ADJUSTMENT DISORDER WITH ANXIETY: ICD-10-CM

## 2023-10-10 NOTE — TELEPHONE ENCOUNTER
Prescription approved per [facility] Refill Protocol.  Due for physical November 2023  Kylie ROCHA RN

## 2023-10-30 DIAGNOSIS — E06.3 HASHIMOTO'S THYROIDITIS: ICD-10-CM

## 2023-10-31 RX ORDER — LEVOTHYROXINE SODIUM 75 UG/1
37.5 TABLET ORAL DAILY
Qty: 45 TABLET | Refills: 0 | Status: SHIPPED | OUTPATIENT
Start: 2023-10-31 | End: 2023-11-22

## 2023-11-20 ASSESSMENT — ENCOUNTER SYMPTOMS
ABDOMINAL PAIN: 0
SORE THROAT: 0
HEADACHES: 0
HEMATOCHEZIA: 0
PARESTHESIAS: 0
FEVER: 0
HEMATURIA: 0
FREQUENCY: 0
BREAST MASS: 0
EYE PAIN: 0
JOINT SWELLING: 0
HEARTBURN: 0
DIZZINESS: 0
DYSURIA: 0
PALPITATIONS: 0
SHORTNESS OF BREATH: 0
CONSTIPATION: 0
DIARRHEA: 0
NAUSEA: 0
WEAKNESS: 0
COUGH: 0
ARTHRALGIAS: 0
NERVOUS/ANXIOUS: 0
MYALGIAS: 0
CHILLS: 0

## 2023-11-22 ENCOUNTER — OFFICE VISIT (OUTPATIENT)
Dept: FAMILY MEDICINE | Facility: CLINIC | Age: 33
End: 2023-11-22
Payer: COMMERCIAL

## 2023-11-22 VITALS
WEIGHT: 125 LBS | TEMPERATURE: 97.3 F | RESPIRATION RATE: 12 BRPM | HEIGHT: 63 IN | SYSTOLIC BLOOD PRESSURE: 102 MMHG | HEART RATE: 82 BPM | DIASTOLIC BLOOD PRESSURE: 70 MMHG | BODY MASS INDEX: 22.15 KG/M2 | OXYGEN SATURATION: 99 %

## 2023-11-22 DIAGNOSIS — E06.3 HASHIMOTO'S THYROIDITIS: ICD-10-CM

## 2023-11-22 DIAGNOSIS — Z00.00 ROUTINE GENERAL MEDICAL EXAMINATION AT A HEALTH CARE FACILITY: Primary | ICD-10-CM

## 2023-11-22 DIAGNOSIS — E03.9 ACQUIRED HYPOTHYROIDISM: ICD-10-CM

## 2023-11-22 DIAGNOSIS — D50.9 IRON DEFICIENCY ANEMIA, UNSPECIFIED IRON DEFICIENCY ANEMIA TYPE: ICD-10-CM

## 2023-11-22 DIAGNOSIS — L50.9 HIVES: ICD-10-CM

## 2023-11-22 DIAGNOSIS — F43.22 ADJUSTMENT DISORDER WITH ANXIETY: ICD-10-CM

## 2023-11-22 DIAGNOSIS — R53.83 FATIGUE, UNSPECIFIED TYPE: ICD-10-CM

## 2023-11-22 LAB
ERYTHROCYTE [DISTWIDTH] IN BLOOD BY AUTOMATED COUNT: 12.6 % (ref 10–15)
HCT VFR BLD AUTO: 44.5 % (ref 35–47)
HGB BLD-MCNC: 14.5 G/DL (ref 11.7–15.7)
MCH RBC QN AUTO: 29 PG (ref 26.5–33)
MCHC RBC AUTO-ENTMCNC: 32.6 G/DL (ref 31.5–36.5)
MCV RBC AUTO: 89 FL (ref 78–100)
PLATELET # BLD AUTO: 229 10E3/UL (ref 150–450)
RBC # BLD AUTO: 5 10E6/UL (ref 3.8–5.2)
WBC # BLD AUTO: 5.2 10E3/UL (ref 4–11)

## 2023-11-22 PROCEDURE — 82728 ASSAY OF FERRITIN: CPT | Performed by: FAMILY MEDICINE

## 2023-11-22 PROCEDURE — 99395 PREV VISIT EST AGE 18-39: CPT | Performed by: FAMILY MEDICINE

## 2023-11-22 PROCEDURE — 85027 COMPLETE CBC AUTOMATED: CPT | Performed by: FAMILY MEDICINE

## 2023-11-22 PROCEDURE — 84443 ASSAY THYROID STIM HORMONE: CPT | Performed by: FAMILY MEDICINE

## 2023-11-22 PROCEDURE — 99214 OFFICE O/P EST MOD 30 MIN: CPT | Mod: 25 | Performed by: FAMILY MEDICINE

## 2023-11-22 PROCEDURE — 86039 ANTINUCLEAR ANTIBODIES (ANA): CPT | Performed by: FAMILY MEDICINE

## 2023-11-22 PROCEDURE — 86038 ANTINUCLEAR ANTIBODIES: CPT | Performed by: FAMILY MEDICINE

## 2023-11-22 PROCEDURE — 36415 COLL VENOUS BLD VENIPUNCTURE: CPT | Performed by: FAMILY MEDICINE

## 2023-11-22 PROCEDURE — 80053 COMPREHEN METABOLIC PANEL: CPT | Performed by: FAMILY MEDICINE

## 2023-11-22 RX ORDER — LEVOTHYROXINE SODIUM 75 UG/1
37.5 TABLET ORAL DAILY
Qty: 45 TABLET | Refills: 1 | Status: SHIPPED | OUTPATIENT
Start: 2023-11-22

## 2023-11-22 ASSESSMENT — PAIN SCALES - GENERAL: PAINLEVEL: NO PAIN (0)

## 2023-11-22 ASSESSMENT — ANXIETY QUESTIONNAIRES
7. FEELING AFRAID AS IF SOMETHING AWFUL MIGHT HAPPEN: NOT AT ALL
GAD7 TOTAL SCORE: 1
4. TROUBLE RELAXING: NOT AT ALL
1. FEELING NERVOUS, ANXIOUS, OR ON EDGE: SEVERAL DAYS
3. WORRYING TOO MUCH ABOUT DIFFERENT THINGS: NOT AT ALL
2. NOT BEING ABLE TO STOP OR CONTROL WORRYING: NOT AT ALL
GAD7 TOTAL SCORE: 1
IF YOU CHECKED OFF ANY PROBLEMS ON THIS QUESTIONNAIRE, HOW DIFFICULT HAVE THESE PROBLEMS MADE IT FOR YOU TO DO YOUR WORK, TAKE CARE OF THINGS AT HOME, OR GET ALONG WITH OTHER PEOPLE: NOT DIFFICULT AT ALL
5. BEING SO RESTLESS THAT IT IS HARD TO SIT STILL: NOT AT ALL
6. BECOMING EASILY ANNOYED OR IRRITABLE: NOT AT ALL

## 2023-11-22 ASSESSMENT — ENCOUNTER SYMPTOMS
DIARRHEA: 0
DIZZINESS: 0
SHORTNESS OF BREATH: 0
HEARTBURN: 0
COUGH: 0
HEMATURIA: 0
ARTHRALGIAS: 0
FREQUENCY: 0
JOINT SWELLING: 0
CHILLS: 0
FEVER: 0
WEAKNESS: 0
SORE THROAT: 0
HEMATOCHEZIA: 0
HEADACHES: 0
DYSURIA: 0
PARESTHESIAS: 0
NAUSEA: 0
ABDOMINAL PAIN: 0
NERVOUS/ANXIOUS: 0
CONSTIPATION: 0
PALPITATIONS: 0
EYE PAIN: 0
BREAST MASS: 0
MYALGIAS: 0

## 2023-11-22 NOTE — PROGRESS NOTES
SUBJECTIVE:   Oscar is a 33 year old, presenting for the following:  Physical        11/22/2023    10:56 AM   Additional Questions   Roomed by Karl ZUNIGA       Healthy Habits:     Getting at least 3 servings of Calcium per day:  Yes    Bi-annual eye exam:  NO    Dental care twice a year:  Yes    Sleep apnea or symptoms of sleep apnea:  Daytime drowsiness    Diet:  Vegetarian/vegan    Frequency of exercise:  2-3 days/week    Duration of exercise:  30-45 minutes    Taking medications regularly:  Yes    Medication side effects:  None    Additional concerns today:  Yes    1) Hashimotos , she sees endocrinology , they have done the US and labs and recommended follow up next yr again   2) Fatigue is worse lately sleeping 12 hrs a day , has a sudden evening fatigue   Hives randomly , appears for a couple hours and then gone she gets them daily     Aunt has SLE recently   Today's PHQ-2 Score:       11/21/2023    11:42 AM   PHQ-2 ( 1999 Pfizer)   Q1: Little interest or pleasure in doing things 0   Q2: Feeling down, depressed or hopeless 0   PHQ-2 Score 0   Q1: Little interest or pleasure in doing things Not at all   Q2: Feeling down, depressed or hopeless Not at all   PHQ-2 Score 0           Social History     Tobacco Use    Smoking status: Never    Smokeless tobacco: Never    Tobacco comments:     never smoked   Substance Use Topics    Alcohol use: Yes     Comment: Socially             11/20/2023     4:51 PM   Alcohol Use   Prescreen: >3 drinks/day or >7 drinks/week? No     Reviewed orders with patient.  Reviewed health maintenance and updated orders accordingly - Yes  Lab work is in process  Labs reviewed in EPIC  BP Readings from Last 3 Encounters:   11/22/23 102/70   02/27/23 92/59   11/02/22 102/68    Wt Readings from Last 3 Encounters:   11/22/23 56.7 kg (125 lb)   02/27/23 58.9 kg (129 lb 14.4 oz)   11/02/22 56.6 kg (124 lb 12.8 oz)                  Patient Active Problem List   Diagnosis    Esophageal reflux    Benign  neoplasm of skin    CARDIOVASCULAR SCREENING; LDL GOAL LESS THAN 160    Lactose intolerance    Allergy to mold spores    House dust mite allergy    Allergic rhinitis due to animal dander    Diagnostic skin and sensitization tests    Family history of colon cancer    Vitamin D deficiency    JOHN PAUL (generalized anxiety disorder)    Family history of thyroid disease in mother    Ovarian tumor of borderline malignancy, right    Adjustment disorder with anxiety    Acquired hypothyroidism    Night sweats    Dry eyes    Fatigue, unspecified type    Hashimoto's thyroiditis     Past Surgical History:   Procedure Laterality Date    APPENDECTOMY  10/2/2015    BIOPSY  10/2/2015    COLONOSCOPY N/A 12/2/2015    Procedure: COLONOSCOPY;  Surgeon: Aaron Garrison MD;  Location:  GI    ESOPHAGOSCOPY, GASTROSCOPY, DUODENOSCOPY (EGD), COMBINED N/A 11/23/2015    Procedure: COMBINED ESOPHAGOSCOPY, GASTROSCOPY, DUODENOSCOPY (EGD), BIOPSY SINGLE OR MULTIPLE;  Surgeon: Babs Cassidy MD;  Location:  GI    GYN SURGERY  10/2/2015    LAPAROTOMY EXPLORATORY  10/2/15    Right salpingo-oophorectomy, omentectomy, appendectomy, lymph node biopsies, cancer staging     LAPAROTOMY, STAGING, COMBINED N/A 10/2/2015    Procedure: COMBINED LAPAROTOMY, STAGING;  Surgeon: Duyen Thayer MD;  Location:  OR       Social History     Tobacco Use    Smoking status: Never    Smokeless tobacco: Never    Tobacco comments:     never smoked   Substance Use Topics    Alcohol use: Yes     Comment: Socially     Family History   Problem Relation Age of Onset    Diabetes Mother         Gestational diabates when pregnant with me    Thyroid Disease Mother     Blood Disease Mother         anemia    Cancer - colorectal Mother 54    Colon Cancer Mother         Doing well    Cancer Maternal Grandmother     Blood Disease Maternal Grandmother         cervical    Respiratory Maternal Grandmother         emphysema    Diabetes Maternal Grandfather              Cerebrovascular Disease Maternal Grandfather     Cancer Maternal Grandfather         kidney    Breast Cancer Paternal Grandmother             Diabetes Paternal Grandfather             Cerebrovascular Disease Paternal Grandfather     Skin Cancer Paternal Grandfather     Heart Disease Father 56        had angioplasty    Coronary Artery Disease Father         Had angioplasty in 2014, and two additional angioplasties in     Substance Abuse Father         Has abused alcohol since before I was born    Mental Illness Sister         Borderline personality disorder         Current Outpatient Medications   Medication Sig Dispense Refill    Cholecalciferol (VITAMIN D-3 PO)       FLUoxetine (PROZAC) 20 MG capsule TAKE 1 CAPSULE BY MOUTH DAILY 90 capsule 1    LACTOBACILLUS PO 50 billion      levothyroxine (SYNTHROID/LEVOTHROID) 75 MCG tablet Take 0.5 tablets (37.5 mcg) by mouth daily 45 tablet 1    loratadine (CLARITIN) 10 MG tablet Take 1 tablet (10 mg) by mouth daily 30 tablet 1    MULTIPLE VITAMINS PO       pimecrolimus (ELIDEL) 1 % external cream Apply topically 2 times daily To affected area on lower lash line. 30 g 0     Allergies   Allergen Reactions    Clindamycin Phosphate Rash    Keflex [Cephalexin Monohydrate] Rash    Zoloft      Recent Labs   Lab Test 23  1530 22  0914 21  1501 21  1023 10/06/20  1423 19  0850   LDL  --   --   --   --   --  72   HDL  --   --   --   --   --  72   TRIG  --   --   --   --   --  79   ALT  --  14  --  20  --  12   CR  --  0.70  --  0.76  --  0.75   GFRESTIMATED  --  >90  --  >90  --  >90   GFRESTBLACK  --   --   --  >90  --  >90   POTASSIUM  --   --   --  4.0  --  3.7   TSH 1.79 2.02   < >  --    < > 1.58    < > = values in this interval not displayed.        Breast Cancer Screening:    FHS-7:        No data to display                Patient under 40 years of age: Routine Mammogram Screening not recommended.    Pertinent mammograms are reviewed under the imaging tab.    History of abnormal Pap smear: NO - age 30- 65 PAP every 3 years recommended      Latest Ref Rng & Units 11/22/2021     3:41 PM 10/30/2019     3:35 PM 10/30/2019    10:13 AM   PAP / HPV   PAP  Negative for Intraepithelial Lesion or Malignancy (NILM)      PAP (Historical)    NIL    HPV 16 DNA Negative Negative  Negative     HPV 18 DNA Negative Negative  Negative     Other HR HPV Negative Negative  Negative       Reviewed and updated as needed this visit by clinical staff                  Reviewed and updated as needed this visit by Provider                 Past Medical History:   Diagnosis Date    Allergic rhinitis due to animal dander     Allergy to mold spores     2/26/14 skin tests pos. for:  dog(+)/DM/M only and only on intradermals--ie, minimally allergic    Asthma, mild intermittent     last inhaler usage >6 years ago (4/11)    Cancer (H) 9/2015    Stage 1A ovarian cancer    Diagnostic skin and sensitization tests 2/26/14 skin tests pos. 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    treated with RSO    Thyroid disease 10/2020    Hashimoto's disease      Past Surgical History:   Procedure Laterality Date    APPENDECTOMY  10/2/2015    BIOPSY  10/2/2015    COLONOSCOPY N/A 12/2/2015    Procedure: COLONOSCOPY;  Surgeon: Aaron Garrison MD;  Location:  GI    ESOPHAGOSCOPY, GASTROSCOPY, DUODENOSCOPY (EGD), COMBINED N/A 11/23/2015    Procedure: COMBINED ESOPHAGOSCOPY, GASTROSCOPY, DUODENOSCOPY (EGD), BIOPSY SINGLE OR MULTIPLE;  Surgeon: Babs Cassidy MD;  Location:  GI    GYN SURGERY  10/2/2015    LAPAROTOMY EXPLORATORY  10/2/15    Right salpingo-oophorectomy, omentectomy, appendectomy, lymph node biopsies, cancer staging     LAPAROTOMY, STAGING, COMBINED N/A 10/2/2015    Procedure: COMBINED  LAPAROTOMY, STAGING;  Surgeon: Duyen Thayer MD;  Location: [facility] OR       Review of Systems   Constitutional:  Negative for chills and fever.   HENT:  Negative for congestion, ear pain, hearing loss and sore throat.    Eyes:  Negative for pain and visual disturbance.   Respiratory:  Negative for cough and shortness of breath.    Cardiovascular:  Negative for chest pain, palpitations and peripheral edema.   Gastrointestinal:  Negative for abdominal pain, constipation, diarrhea, heartburn, hematochezia and nausea.   Breasts:  Negative for tenderness, breast mass and discharge.   Genitourinary:  Negative for dysuria, frequency, genital sores, hematuria, pelvic pain, urgency, vaginal bleeding and vaginal discharge.   Musculoskeletal:  Negative for arthralgias, joint swelling and myalgias.   Skin:  Negative for rash.   Neurological:  Negative for dizziness, weakness, headaches and paresthesias.   Psychiatric/Behavioral:  Negative for mood changes. The patient is not nervous/anxious.      CONSTITUTIONAL: NEGATIVE for fever, chills, change in weight  INTEGUMENTARU/SKIN: NEGATIVE for worrisome rashes, moles or lesions  EYES: NEGATIVE for vision changes or irritation  ENT: NEGATIVE for ear, mouth and throat problems  RESP: NEGATIVE for significant cough or SOB  BREAST: NEGATIVE for masses, tenderness or discharge  CV: NEGATIVE for chest pain, palpitations or peripheral edema  GI: NEGATIVE for nausea, abdominal pain, heartburn, or change in bowel habits  : NEGATIVE for unusual urinary or vaginal symptoms. Periods are regular.  MUSCULOSKELETAL: NEGATIVE for significant arthralgias or myalgia  NEURO: NEGATIVE for weakness, dizziness or paresthesias  PSYCHIATRIC: NEGATIVE for changes in mood or affect     OBJECTIVE:   There were no vitals taken for this visit.  Physical Exam  GENERAL: healthy, alert and no distress  EYES: Eyes grossly normal to inspection, PERRL and conjunctivae and sclerae normal  HENT: ear canals and  TM's normal, nose and mouth without ulcers or lesions  NECK: no adenopathy, no asymmetry, masses, or scars and thyroid normal to palpation  RESP: lungs clear to auscultation - no rales, rhonchi or wheezes  BREAST: normal without masses, tenderness or nipple discharge and no palpable axillary masses or adenopathy  CV: regular rate and rhythm, normal S1 S2, no S3 or S4, no murmur, click or rub, no peripheral edema and peripheral pulses strong  ABDOMEN: soft, nontender, no hepatosplenomegaly, no masses and bowel sounds normal  MS: no gross musculoskeletal defects noted, no edema  SKIN: no suspicious lesions or rashes  NEURO: Normal strength and tone, mentation intact and speech normal  PSYCH: mentation appears normal, affect normal/bright    Diagnostic Test Results:  Labs reviewed in Epic  Results for orders placed or performed in visit on 11/22/23 (from the past 24 hour(s))   CBC with platelets   Result Value Ref Range    WBC Count 5.2 4.0 - 11.0 10e3/uL    RBC Count 5.00 3.80 - 5.20 10e6/uL    Hemoglobin 14.5 11.7 - 15.7 g/dL    Hematocrit 44.5 35.0 - 47.0 %    MCV 89 78 - 100 fL    MCH 29.0 26.5 - 33.0 pg    MCHC 32.6 31.5 - 36.5 g/dL    RDW 12.6 10.0 - 15.0 %    Platelet Count 229 150 - 450 10e3/uL       ASSESSMENT/PLAN:   (Z00.00) Routine general medical examination at a health care facility  (primary encounter diagnosis)  Comment: Discussed diet,calcium,exercise.Went over self breast exam.Thin prep was NOT done.Eyes and teeth UTD.No immunizations needed today.See orders below for tests ordered and screening needed.    Plan: PRIMARY CARE FOLLOW-UP SCHEDULING, REVIEW OF         HEALTH MAINTENANCE PROTOCOL ORDERS            (E03.9) Acquired hypothyroidism  Comment: she sees endocrinology and will schedule a follow up for February of 2024   Plan: as above     (E06.3) Hashimoto's thyroiditis  Comment: refilled her synthroid as she will be out of this   Plan: levothyroxine (SYNTHROID/LEVOTHROID) 75 MCG         tablet         As above     (D50.9) Iron deficiency anemia, unspecified iron deficiency anemia type  Comment: CBC with platelets , ferritin   Plan: will check labs as above today     (F43.22) Adjustment disorder with anxiety  Comment: she is doing well on the fluoxetine, keeps anxiety under control , no side effects   Plan: FLUoxetine (PROZAC) 20 MG capsule        I have refilled it     (R53.83) Fatigue, unspecified type  Comment: we discussed checking labs as below , she denies any depressive symptoms , just extreme fatigue   Plan: CBC with platelets, TSH with free T4 reflex,         Ferritin, Comprehensive metabolic panel (BMP +         Alb, Alk Phos, ALT, AST, Total. Bili, TP), Anti        Nuclear Carmela IgG by IFA with Reflex        Will follow up when labs are back     (L50.9) Hives  Comment: she gets hives frequently , on a daily basis and has not seen an allergist , I have given her a referral for testing   Plan: Adult Allergy/Asthma  Referral        As above     Patient has been advised of split billing requirements and indicates understanding: Yes      COUNSELING:  Reviewed preventive health counseling, as reflected in patient instructions       Regular exercise       Healthy diet/nutrition       Vision screening        She reports that she has never smoked. She has never used smokeless tobacco.        Ariadne Jimenez MD  [facility] [place]  Answers submitted by the patient for this visit:  JOHN PAUL-7 (Submitted on 11/22/2023)  JOHN PAUL 7 TOTAL SCORE: 1

## 2023-11-23 LAB
ALBUMIN SERPL BCG-MCNC: 4.8 G/DL (ref 3.5–5.2)
ALP SERPL-CCNC: 48 U/L (ref 40–150)
ALT SERPL W P-5'-P-CCNC: 9 U/L (ref 0–50)
ANION GAP SERPL CALCULATED.3IONS-SCNC: 11 MMOL/L (ref 7–15)
AST SERPL W P-5'-P-CCNC: 16 U/L (ref 0–45)
BILIRUB SERPL-MCNC: 0.5 MG/DL
BUN SERPL-MCNC: 8.7 MG/DL (ref 6–20)
CALCIUM SERPL-MCNC: 9.2 MG/DL (ref 8.6–10)
CHLORIDE SERPL-SCNC: 100 MMOL/L (ref 98–107)
CREAT SERPL-MCNC: 0.71 MG/DL (ref 0.51–0.95)
DEPRECATED HCO3 PLAS-SCNC: 25 MMOL/L (ref 22–29)
EGFRCR SERPLBLD CKD-EPI 2021: >90 ML/MIN/1.73M2
FERRITIN SERPL-MCNC: 57 NG/ML (ref 6–175)
GLUCOSE SERPL-MCNC: 79 MG/DL (ref 70–99)
POTASSIUM SERPL-SCNC: 4.2 MMOL/L (ref 3.4–5.3)
PROT SERPL-MCNC: 7.4 G/DL (ref 6.4–8.3)
SODIUM SERPL-SCNC: 136 MMOL/L (ref 135–145)
TSH SERPL DL<=0.005 MIU/L-ACNC: 1.67 UIU/ML (ref 0.3–4.2)

## 2023-11-24 LAB
ANA PAT SER IF-IMP: ABNORMAL
ANA SER QL IF: ABNORMAL
ANA TITR SER IF: ABNORMAL {TITER}

## 2023-11-27 ENCOUNTER — MYC MEDICAL ADVICE (OUTPATIENT)
Dept: FAMILY MEDICINE | Facility: CLINIC | Age: 33
End: 2023-11-27
Payer: COMMERCIAL

## 2023-11-27 DIAGNOSIS — M25.50 MULTIPLE JOINT PAIN: Primary | ICD-10-CM

## 2023-11-27 NOTE — TELEPHONE ENCOUNTER
LS,  Please see below [employer] message and advise.  Pended order if approved.  Thanks,  Kylie JEFFREY RN

## 2023-12-12 ENCOUNTER — OFFICE VISIT (OUTPATIENT)
Dept: ALLERGY | Facility: CLINIC | Age: 33
End: 2023-12-12
Attending: FAMILY MEDICINE
Payer: COMMERCIAL

## 2023-12-12 VITALS — SYSTOLIC BLOOD PRESSURE: 106 MMHG | DIASTOLIC BLOOD PRESSURE: 73 MMHG | HEART RATE: 96 BPM | OXYGEN SATURATION: 97 %

## 2023-12-12 DIAGNOSIS — J31.0 NONALLERGIC RHINITIS: Primary | ICD-10-CM

## 2023-12-12 DIAGNOSIS — L50.8 CHRONIC URTICARIA: ICD-10-CM

## 2023-12-12 PROCEDURE — 95004 PERQ TESTS W/ALRGNC XTRCS: CPT | Performed by: ALLERGY & IMMUNOLOGY

## 2023-12-12 PROCEDURE — 36415 COLL VENOUS BLD VENIPUNCTURE: CPT | Performed by: ALLERGY & IMMUNOLOGY

## 2023-12-12 PROCEDURE — 99243 OFF/OP CNSLTJ NEW/EST LOW 30: CPT | Mod: 25 | Performed by: ALLERGY & IMMUNOLOGY

## 2023-12-12 PROCEDURE — 86003 ALLG SPEC IGE CRUDE XTRC EA: CPT | Performed by: ALLERGY & IMMUNOLOGY

## 2023-12-12 NOTE — Clinical Note
12/12/2023         RE: Kelley Serra  [address] [place] [address]        Dear Colleague,    Thank you for referring your patient, Kelley Serra, to the [facility]. Please see a copy of my visit note below.    Kelley Serra was seen in the Allergy Clinic at [facility].    Kelley Serra is a 33 year old White female being seen today at the request of *** in consultation for ***    Chronic runny nose, sneezing, watery eyes    Random red welts that appear on her skin - head, trunk, extremities. They appear on a daily basis. Last a few hours and resolve.    Has fatigue and very itchy ears.    Mold, dust mite, dogs positive on previous intradermal testing - hasn't found success with medications, changing her diet, changing skin care products    Hasn't taken antihistamines in a couple of months. Has tried loratadine, cetirizine, and fexofenadine. Has also tried diphenhydramine. Has tried eye drops for dry eyes as well as antihistamine eye drops. Nasal sprays help temporarily but not significantly - using a natural nasal spray.  Has tried Flonase in the past but not in a few years.    Past Medical History:   Diagnosis Date    Allergic rhinitis due to animal dander     Allergy to mold spores     2/26/14 skin tests pos. for:  dog(+)/DM/M only and only on intradermals--ie, minimally allergic    Asthma, mild intermittent     last inhaler usage >6 years ago (4/11)    Cancer (H) 9/2015    Stage 1A ovarian cancer    Diagnostic skin and sensitization tests 2/26/14 skin tests pos. 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    treated with RSO    Thyroid disease 10/2020    Hashimoto's disease     Family History   Problem Relation Age of Onset    Diabetes Mother         Gestational diabates when pregnant with me     Thyroid Disease Mother     Blood Disease Mother         anemia    Cancer - colorectal Mother 54    Colon Cancer Mother         Doing well    Cancer Maternal Grandmother     Blood Disease Maternal Grandmother         cervical    Respiratory Maternal Grandmother         emphysema    Diabetes Maternal Grandfather             Cerebrovascular Disease Maternal Grandfather     Cancer Maternal Grandfather         kidney    Breast Cancer Paternal Grandmother             Diabetes Paternal Grandfather             Cerebrovascular Disease Paternal Grandfather     Skin Cancer Paternal Grandfather     Heart Disease Father 56        had angioplasty    Coronary Artery Disease Father         Had angioplasty in 2014, and two additional angioplasties in     Substance Abuse Father         Has abused alcohol since before I was born    Mental Illness Sister         Borderline personality disorder     Past Surgical History:   Procedure Laterality Date    APPENDECTOMY  10/2/2015    BIOPSY  10/2/2015    COLONOSCOPY N/A 2015    Procedure: COLONOSCOPY;  Surgeon: Aaron Garrison MD;  Location:  GI    ESOPHAGOSCOPY, GASTROSCOPY, DUODENOSCOPY (EGD), COMBINED N/A 2015    Procedure: COMBINED ESOPHAGOSCOPY, GASTROSCOPY, DUODENOSCOPY (EGD), BIOPSY SINGLE OR MULTIPLE;  Surgeon: Babs Cassidy MD;  Location:  GI    GYN SURGERY  10/2/2015    LAPAROTOMY EXPLORATORY  10/2/15    Right salpingo-oophorectomy, omentectomy, appendectomy, lymph node biopsies, cancer staging     LAPAROTOMY, STAGING, COMBINED N/A 10/2/2015    Procedure: COMBINED LAPAROTOMY, STAGING;  Surgeon: Duyen Thayer MD;  Location:  OR       ENVIRONMENTAL HISTORY:   [place] The home is heated with a forced air. They do have central air conditioning. The patient's bedroom is furnished with hard teri in bedroom and allergen mattress cover.  Pets inside the  house include 5 rabbits. There is no history of cockroach or mice infestation. Do you smoke cigarettes or other recreational drugs? No Do you vape or use an e-cigarette? No. There is/are 0 smokers living in the house. There is/are 0 who smoke ecigarettes/vape living in the house. The house does not have a damp basement.     SOCIAL HISTORY:   Kelley is employed as . She lives with her spouse.        Current Outpatient Medications:     Cholecalciferol (VITAMIN D-3 PO), , Disp: , Rfl:     FLUoxetine (PROZAC) 20 MG capsule, TAKE 1 CAPSULE BY MOUTH DAILY, Disp: 90 capsule, Rfl: 1    LACTOBACILLUS PO, 50 billion, Disp: , Rfl:     levothyroxine (SYNTHROID/LEVOTHROID) 75 MCG tablet, Take 0.5 tablets (37.5 mcg) by mouth daily, Disp: 45 tablet, Rfl: 1    loratadine (CLARITIN) 10 MG tablet, Take 1 tablet (10 mg) by mouth daily, Disp: 30 tablet, Rfl: 1    MULTIPLE VITAMINS PO, , Disp: , Rfl:     pimecrolimus (ELIDEL) 1 % external cream, Apply topically 2 times daily To affected area on lower lash line., Disp: 30 g, Rfl: 0  Immunization History   Administered Date(s) Administered    COVID-19 12+ (2023-24) (Pfizer) 11/17/2023    COVID-19 Bivalent 12+ (Pfizer) 09/15/2022    COVID-19 MONOVALENT 12+ (Pfizer) 04/06/2021, 04/28/2021, 11/23/2021    Flu, Unspecified 11/17/2023    HepB 12/29/2003    Historical DTP/aP 1988    Influenza Vaccine >6 months,quad, PF 10/29/2020, 10/15/2021, 11/02/2022, 11/17/2023    MMR 06/21/2000    OPV, trivalent, live 1988    TD,PF 7+ (Tenivac) 06/02/2000    TDAP (Adacel,Boostrix) 11/02/2022    TDAP Vaccine (Adacel) 09/02/2011     Allergies   Allergen Reactions    Clindamycin Phosphate Rash    Keflex [Cephalexin Monohydrate] Rash    Zoloft          EXAM:   LMP 11/11/2023 (Exact Date)   Physical Exam      WORKUP: {ALLERGYWORKUP:679492}    ASSESSMENT/PLAN:  Kelley Serra is a 33 year old female ***    ***    Follow-up in ***      Thank you for allowing me to participate in  the care of Kelley Serra.      A total of *** minutes, outside of separately billable procedures and injections, was spent on the day of the encounter performing chart review, history and exam, documentation, and counseling and coordination of care as noted above.       Yady Tom MD, [facility]  Allergy/Immunology  [facility]      Chart documentation done in part with Dragon Voice Recognition Software. Although reviewed after completion, some word and grammatical errors may remain.      Again, thank you for allowing me to participate in the care of your patient.        Sincerely,        Yady Tom MD

## 2023-12-12 NOTE — PROGRESS NOTES
Kelley Serra was seen in the Allergy Clinic at [facility].    Kelley Serra is a 33 year old White female being seen today at the request of Dr. Jimenez in consultation for allergies.    Reports chronic symptoms including runny nose, sneezing, watery eyes.    Random red welts that appear on her skin - head, trunk, extremities. They appear on a daily basis. Last a few hours and resolve.    Has fatigue and very itchy ears.    Mold, dust mite, dogs positive on previous intradermal testing - hasn't found success with medications, changing her diet, changing skin care products    Hasn't taken antihistamines in a couple of months. Has tried loratadine, cetirizine, and fexofenadine. Has also tried diphenhydramine. Has tried eye drops for dry eyes as well as antihistamine eye drops. Nasal sprays help temporarily but not significantly - using a natural nasal spray.  Has tried Flonase in the past but not in a few years.    Past Medical History:   Diagnosis Date    Allergic rhinitis due to animal dander     Allergy to mold spores     2/26/14 skin tests pos. for:  dog(+)/DM/M only and only on intradermals--ie, minimally allergic    Asthma, mild intermittent     last inhaler usage >6 years ago (4/11)    Cancer (H) 9/2015    Stage 1A ovarian cancer    Diagnostic skin and sensitization tests 2/26/14 skin tests pos. 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    treated with RSO    Thyroid disease 10/2020    Hashimoto's disease     Family History   Problem Relation Age of Onset    Diabetes Mother         Gestational diabates when pregnant with me    Thyroid Disease Mother     Blood Disease Mother         anemia    Cancer - colorectal Mother 54    Colon Cancer Mother         Doing well    Cancer Maternal Grandmother     Blood Disease Maternal Grandmother          cervical    Respiratory Maternal Grandmother         emphysema    Diabetes Maternal Grandfather             Cerebrovascular Disease Maternal Grandfather     Cancer Maternal Grandfather         kidney    Breast Cancer Paternal Grandmother             Diabetes Paternal Grandfather             Cerebrovascular Disease Paternal Grandfather     Skin Cancer Paternal Grandfather     Heart Disease Father 56        had angioplasty    Coronary Artery Disease Father         Had angioplasty in 2014, and two additional angioplasties in     Substance Abuse Father         Has abused alcohol since before I was born    Mental Illness Sister         Borderline personality disorder     Past Surgical History:   Procedure Laterality Date    APPENDECTOMY  10/2/2015    BIOPSY  10/2/2015    COLONOSCOPY N/A 2015    Procedure: COLONOSCOPY;  Surgeon: Aaron Garrison MD;  Location:  GI    ESOPHAGOSCOPY, GASTROSCOPY, DUODENOSCOPY (EGD), COMBINED N/A 2015    Procedure: COMBINED ESOPHAGOSCOPY, GASTROSCOPY, DUODENOSCOPY (EGD), BIOPSY SINGLE OR MULTIPLE;  Surgeon: Babs Cassidy MD;  Location:  GI    GYN SURGERY  10/2/2015    LAPAROTOMY EXPLORATORY  10/2/15    Right salpingo-oophorectomy, omentectomy, appendectomy, lymph node biopsies, cancer staging     LAPAROTOMY, STAGING, COMBINED N/A 10/2/2015    Procedure: COMBINED LAPAROTOMY, STAGING;  Surgeon: Duyen Thayer MD;  Location:  OR       ENVIRONMENTAL HISTORY:   [place]elley [place] The home is heated with a forced air. They do have central air conditioning. The patient's bedroom is furnished with hard teri in bedroom and allergen mattress cover.  Pets inside the house include 5 rabbits. There is no history of cockroach or mice infestation. Do you smoke cigarettes or other recreational drugs? No Do you vape or use an e-cigarette? No. There is/are 0 smokers living in the house.  There is/are 0 who smoke ecigarettes/vape living in the house. The house does not have a damp basement.     SOCIAL HISTORY:   Kelley is employed as . She lives with her spouse.        Current Outpatient Medications:     Cholecalciferol (VITAMIN D-3 PO), , Disp: , Rfl:     FLUoxetine (PROZAC) 20 MG capsule, TAKE 1 CAPSULE BY MOUTH DAILY, Disp: 90 capsule, Rfl: 1    LACTOBACILLUS PO, 50 billion, Disp: , Rfl:     levothyroxine (SYNTHROID/LEVOTHROID) 75 MCG tablet, Take 0.5 tablets (37.5 mcg) by mouth daily, Disp: 45 tablet, Rfl: 1    MULTIPLE VITAMINS PO, , Disp: , Rfl:     pimecrolimus (ELIDEL) 1 % external cream, Apply topically 2 times daily To affected area on lower lash line., Disp: 30 g, Rfl: 0  Immunization History   Administered Date(s) Administered    COVID-19 12+ (2023-24) (Pfizer) 11/17/2023    COVID-19 Bivalent 12+ (Pfizer) 09/15/2022    COVID-19 MONOVALENT 12+ (Pfizer) 04/06/2021, 04/28/2021, 11/23/2021    Flu, Unspecified 11/17/2023    HepB 12/29/2003    Historical DTP/aP 1988    Influenza Vaccine >6 months,quad, PF 10/29/2020, 10/15/2021, 11/02/2022, 11/17/2023    MMR 06/21/2000    OPV, trivalent, live 1988    TD,PF 7+ (Tenivac) 06/02/2000    TDAP (Adacel,Boostrix) 11/02/2022    TDAP Vaccine (Adacel) 09/02/2011     Allergies   Allergen Reactions    Clindamycin Phosphate Rash    Keflex [Cephalexin Monohydrate] Rash    Zoloft          EXAM:   /73 (BP Location: Left arm, Patient Position: Sitting, Cuff Size: Adult Regular)   Pulse 96   LMP 11/11/2023 (Exact Date)   SpO2 97%   Physical Exam  Vitals and nursing note reviewed.   Constitutional:       Appearance: Normal appearance.   HENT:      Head: Normocephalic and atraumatic.      Right Ear: External ear normal.      Left Ear: External ear normal.      Nose: No mucosal edema or rhinorrhea.      Mouth/Throat:      Mouth: Mucous membranes are moist. No oral lesions.      Pharynx: Oropharynx is clear. Uvula midline. No  posterior oropharyngeal erythema.   Eyes:      General: Lids are normal. No scleral icterus.     Extraocular Movements: Extraocular movements intact.      Conjunctiva/sclera: Conjunctivae normal.   Neck:      Comments: No asymmetry, masses, or scars  Cardiovascular:      Rate and Rhythm: Normal rate and regular rhythm.      Heart sounds: S1 normal and S2 normal. No murmur heard.  Pulmonary:      Effort: Pulmonary effort is normal. No respiratory distress.      Breath sounds: Normal breath sounds and air entry.   Musculoskeletal:      Comments: No musculoskeletal defects noted   Skin:     General: Skin is warm and dry.      Findings: No lesion or rash.   Neurological:      General: No focal deficit present.      Mental Status: She is alert.   Psychiatric:         Mood and Affect: Mood and affect normal.           WORKUP: Skin testing    ENVIRONMENTAL PERCUTANEOUS SKIN TESTING: ADULT      12/12/2023     1:00 PM   [facility]   Consent Y   Ordering Physician Dr. Tom   Interpreting Physician Dr. Tom   Testing Technician Kathryn   Location Back   Time start: 13:37   Time End: 13:52   Positive Control: Histatrol*ALK 1 mg/ml 5/22   Negative Control: 50% Glycerin 0   Cat Hair*ALK (10,000 BAU/ml) 0   AP Dog Hair/Dander (1:100 w/v) 0   Dust Mite p. 30,000 AU/ml 0   Dust Mite f. (30,000 AU/ml) 0   Joe (W/F in millimeters) 0   Merrick Grass (100,000 BAU/mL) 0   Red Cedar (W/F in millimeters) 0   Maple/Brown (W/F in millimeters) 0   Hackberry (W/F in millimeters) 0   Conley (W/F in millimeters) 0   Assumption *ALK (W/F in millimeters) 0   American Elm (W/F in millimeters) 0   York (W/F in millimeters) 0   Black Starbuck (W/F in millimeters) 0   Birch Mix (W/F in millimeters) 0   Odin (W/F in millimeters) 0   Oak (W/F in millimeters) 0   Cocklebur (W/F in millimeters) 0   Conyers (W/F in millimeters) 0   White Larry (W/F in millimeters) 0   Careless (W/F in millimeters) 0   Nettle (W/F in  millimeters) 0   English Plantain (W/F in millimeters) 0   Kochia (W/F in millimeters) 0   Lamb's Quarter (W/F in millimeters) 0   Marshelder (W/F in millimeters) 0   Ragweed Mix* ALK (W/F in millimeters) 0   Russian Thistle (W/F in millimeters) 0   Sagebrush/Mugwort (W/F in millimeters) 0   Sheep Sorrel (W/F in millimeters) 0   Feather Mix* ALK (W/F in millimeters) 0   Penicillium Mix (1:10 w/v) 0   Curvularia spicifera (1:10 w/v) 0   Epicoccum (1:10 w/v) 0   Aspergillus fumigatus (1:10 w/v): 0   Alternaria tenius (1:10 w/v) 0   H. Cladosporium (1:10 w/v) 0   Phoma herbarum (1:10 w/v) 0      Appropriate response to controls, all others negative    ASSESSMENT/PLAN:  Kelley Serra is a 33 year old female here for evaluation of allergies.    1. Nonallergic rhinitis - Skin testing negative for evidence of aeroallergen sensitization. Previous testing in 2014 with a few positives only on intradermal testing which is unlikely to suggest clinically significant allergic sensitization. Given her persistent symptoms, she requested pursuing additional evaluation with IgE testing. She has not found oral antihistamines to be helpful in the past. Recommend trial of both nasal steroid and nasal antihistamine in combination over the next month. If no improvement, she may benefit from evaluation with ENT.    - start triamcinolone nasal spray - 2 sprays in each nostril daily  - start azelastine (Astepro) nasal spray - 2 sprays in each nostril daily  - Adult ENT  Referral; Future  - Allergen cat epithellium IgE; Future  - Allergen dog epithelium IgE; Future  - Allergen Joe grass IgE; Future  - Allergen alfreda IgE; Future  - Allergen D farinae IgE; Future  - Allergen D pteronyssinus IgE; Future  - Allergen alternaria alternata IgE; Future  - Allergen aspergillus fumigatus IgE; Future  - Allergen cladosporium herbarum IgE; Future  - Allergen Epicoccum purpurascens IgE; Future  - Allergen penicillium notatum IgE;  Future  - Allergen dafne white IgE; Future  - Allergen Cedar IgE; Future  - Allergen cottonwood IgE; Future  - Allergen elm IgE; Future  - Allergen maple box elder IgE; Future  - Allergen oak white IgE; Future  - Allergen Red [place] IgE; Future  - Allergen silver  birch IgE; Future  - Allergen Tree White [place] IgE; Future  - Allergen Albany Tree; Future  - Allergen white pine IgE; Future  - Allergen English plantain IgE; Future  - Allergen giant ragweed IgE; Future  - Allergen lamb's quarter IgE; Future  - Allergen Mugwort IgE; Future  - Allergen ragweed short IgE; Future  - Allergen Sagebrush [place]ormwood IgE; Future  - Allergen Sheep Sorrel IgE; Future  - Allergen thistle Russian IgE; Future  - Allergen Weed Nettle IgE; Future  - Allergen, Kochia/Firebush; Future  - ALLERGY SKIN TESTS,ALLERGENS    2. Chronic urticaria - No hives or rash visible on exam today. She reports having itchy welts that appear daily and resolve within a few hours. This description is consistent with possible urticaria. Symptoms have been ongoing for more than 6 weeks which meets the definition of chronic urticaria. We reviewed the pathophysiology and management of chronic hives and she was advised that symptoms are not due to underlying food or environmental allergies.    - recommend starting daily antihistamines such as cetirizine or fexofenadine. Start with 1 tablet daily and increase up to 4x daily dosing (2 tablets twice daily) as needed to achieve symptom control  - follow-up if symptoms continue to persist      Follow-up as needed      Thank you for allowing me to participate in the care of Kelley Serra.      Yady Tom MD, FAAAAI  Allergy/Immunology  [facility] - [facility]      Chart documentation done in part with Dragon Voice Recognition Software. Although reviewed after completion, some word and grammatical errors may remain.

## 2023-12-12 NOTE — PATIENT INSTRUCTIONS
If you have any questions regarding your allergies, asthma, or what we discussed during your visit today please call the allergy clinic or contact us via Transit App.    [facility] â€“ [facility] RN Line: [phone] - call this number with any questions during or after business/clinic hours  [facility] Kunstadter â€“ [facility] - [facility]: [phone]  [facility] Allergy Scheduling - Pediatric Patients: [phone]    All visits for food challenges, medication/drug allergy testing, and drug challenges MUST be scheduled through the allergy clinic nurse. Please call the nurse at [phone] or send a [employer] App message for scheduling. Appointments for these visits that are made through the schedulers or via Transit App may be cancelled or rescheduled.    Clinic Schedule:   [facility] - Monday, Tuesday, and Thursday  [address] [place] [address]    [facility] - Wednesday  [address] [place] [address]      For the nasal symptoms I recommend trying a nasal spray medication. It can take 2-4 weeks for the medications to have an effect so I recommend using them daily for at least 1 month to assess the response:   Nasacort (triamcinolone) - 2 sprays in each nostril daily   Astepro (azelastine) - 2 sprays in each nostril daily    For the hives, I recommend starting with a daily antihistamine such as cetirizine (Zyrtec) or fexofenadine). Start with 1 tablet daily and increase up to a maximum of 2 tablets twice daily to until symptoms are controlled.      ENVIRONMENTAL PERCUTANEOUS SKIN TESTING: ADULT      12/12/2023     1:00 PM   [facility]   Consent Y   Ordering Physician Dr. Tom   Interpreting Physician Dr. Tom   Testing Technician Kathryn   Location Back   Time start: 13:37   Time End: 13:52   Positive Control: Histatrol*ALK 1 mg/ml 5/22   Negative Control: 50% Glycerin 0   Cat Hair*ALK (10,000 BAU/ml) 0   AP Dog Hair/Dander (1:100 w/v) 0   Dust Mite p. 30,000  AU/ml 0   Dust Mite f. (30,000 AU/ml) 0   Joe (W/F in millimeters) 0   Merrick Grass (100,000 BAU/mL) 0   Red Cedar (W/F in millimeters) 0   Maple/[place]ern[place] (W/F in millimeters) 0   Hackberry (W/F in millimeters) 0   Beatrice (W/F in millimeters) 0   Toole *ALK (W/F in millimeters) 0   American Elm (W/F in millimeters) 0   [place] (W/F in millimeters) 0   Black [place] (W/F in millimeters) 0   Birch Mix (W/F in millimeters) 0   [place] (W/F in millimeters) 0   Oak (W/F in millimeters) 0   Cocklebur (W/F in millimeters) 0   [place] (W/F in millimeters) 0   White Larry (W/F in millimeters) 0   Careless (W/F in millimeters) 0   Nettle (W/F in millimeters) 0   English Plantain (W/F in millimeters) 0   Kochia (W/F in millimeters) 0   Lamb's Quarter (W/F in millimeters) 0   Marshelder (W/F in millimeters) 0   Ragweed Mix* ALK (W/F in millimeters) 0   Russian Thistle (W/F in millimeters) 0   Sagebrush/Mugwort (W/F in millimeters) 0   Sheep Sorrel (W/F in millimeters) 0   Feather Mix* ALK (W/F in millimeters) 0   Penicillium Mix (1:10 w/v) 0   Curvularia spicifera (1:10 w/v) 0   Epicoccum (1:10 w/v) 0   Aspergillus fumigatus (1:10 w/v): 0   Alternaria tenius (1:10 w/v) 0   H. Cladosporium (1:10 w/v) 0   Phoma herbarum (1:10 w/v) 0

## 2023-12-13 LAB
A ALTERNATA IGE QN: <0.1 KU(A)/L
A FUMIGATUS IGE QN: <0.1 KU(A)/L
C HERBARUM IGE QN: <0.1 KU(A)/L
CALIF WALNUT POLN IGE QN: <0.1 KU(A)/L
CAT DANDER IGG QN: <0.1 KU(A)/L
CEDAR IGE QN: <0.1 KU(A)/L
COMMON RAGWEED IGE QN: <0.1 KU(A)/L
COTTONWOOD IGE QN: <0.1 KU(A)/L
D FARINAE IGE QN: <0.1 KU(A)/L
D PTERONYSS IGE QN: <0.1 KU(A)/L
DOG DANDER+EPITH IGE QN: <0.1 KU(A)/L
E PURPURASCENS IGE QN: <0.1 KU(A)/L
EAST WHITE PINE IGE QN: <0.1 KU(A)/L
ENGL PLANTAIN IGE QN: <0.1 KU(A)/L
FIREBUSH IGE QN: <0.1 KU(A)/L
GIANT RAGWEED IGE QN: 0.18 KU(A)/L
GOOSEFOOT IGE QN: <0.1 KU(A)/L
JOHNSON GRASS IGE QN: 0.11 KU(A)/L
MAPLE IGE QN: <0.1 KU(A)/L
MUGWORT IGE QN: <0.1 KU(A)/L
NETTLE IGE QN: <0.1 KU(A)/L
P NOTATUM IGE QN: <0.1 KU(A)/L
RED MULBERRY IGE QN: <0.1 KU(A)/L
SALTWORT IGE QN: <0.1 KU(A)/L
SHEEP SORREL IGE QN: <0.1 KU(A)/L
SILVER BIRCH IGE QN: <0.1 KU(A)/L
TIMOTHY IGE QN: <0.1 KU(A)/L
WHITE ASH IGE QN: <0.1 KU(A)/L
WHITE ELM IGE QN: <0.1 KU(A)/L
WHITE MULBERRY IGE QN: <0.1 KU(A)/L
WHITE OAK IGE QN: <0.1 KU(A)/L
WORMWOOD IGE QN: <0.1 KU(A)/L

## 2024-01-22 DIAGNOSIS — F43.22 ADJUSTMENT DISORDER WITH ANXIETY: ICD-10-CM

## 2024-01-24 ENCOUNTER — MYC REFILL (OUTPATIENT)
Dept: FAMILY MEDICINE | Facility: CLINIC | Age: 34
End: 2024-01-24
Payer: COMMERCIAL

## 2024-01-24 DIAGNOSIS — F43.22 ADJUSTMENT DISORDER WITH ANXIETY: ICD-10-CM

## 2024-01-26 ENCOUNTER — NURSE TRIAGE (OUTPATIENT)
Dept: FAMILY MEDICINE | Facility: CLINIC | Age: 34
End: 2024-01-26
Payer: COMMERCIAL

## 2024-01-26 NOTE — TELEPHONE ENCOUNTER
Spoke with patient.  Woke up with pain last Friday- initially thought she slept on muscle.  Now has been a week- seems to be getting worse than better.  No swelling.  Feels like how it does after one gets a shot.  Arm is still functional, able to do ADLs.  Has been trying to minimize using the arm.    Patient will seek care sooner over the weekend if symptoms worsen or she is not able to move arm.    Kylie ROCHA RN   Reason for Disposition   MODERATE pain (e.g., interferes with normal activities) and present > 3 days    Additional Information   Negative: Shock suspected (e.g., cold/pale/clammy skin, too weak to stand, low BP, rapid pulse)   Negative: Similar pain previously and it was from 'heart attack'   Negative: Similar pain previously from 'angina' and not relieved by nitroglycerin   Negative: Sounds like a life-threatening emergency to the triager   Negative: Followed an injury to arm   Negative: Chest pain   Negative: Wound looks infected   Negative: Elbow pain is main symptom   Negative: Hand or wrist pain is main symptom   Negative: Difficulty breathing or unusual sweating (e.g., sweating without exertion)   Negative: Chest pain lasting longer than 5 minutes   Negative: Age > 40 and no obvious cause for pain, pain still present even when not moving the arm   Negative: Fever and red area (or area very tender to touch)   Negative: Swollen joint and fever   Negative: Entire arm is swollen   Negative: Patient sounds very sick or weak to the triager   Negative: SEVERE pain (e.g., excruciating, unable to do any normal activities)   Negative: Red area or streak > 2 inches (or 5 cm)   Negative: Cast on wrist or arm and now increasing pain   Negative: Weakness (i.e., loss of strength) in hand or fingers  (Exception: Not truly weak; hand feels weak because of pain.)   Negative: Arm pains with exertion (e.g., occurs with walking; goes away on resting)   Negative: Painful rash with multiple small blisters grouped  together (i.e., dermatomal distribution or 'band' or 'stripe')   Negative: Looks like a boil, infected sore, deep ulcer, or other infected rash (spreading redness, pus)   Negative: Localized rash is very painful (no fever)   Negative: Numbness (i.e., loss of sensation) in hand or fingers   Negative: Localized pain, redness or hard lump along vein   Negative: Patient wants to be seen    Protocols used: Arm Pain-A-[place]

## 2024-01-26 NOTE — TELEPHONE ENCOUNTER
Triage RN attempted to call the patient to Triage patient's symptoms. Patient is scheduled for an appointment with Dr. Orozco on Monday.     Appointments in Next Year      Jan 29, 2024 10:30 AM  (Arrive by 10:10 AM)  Provider Visit with Akash Orozco MD  [facility] ([facility] ) [phone]     Appointment Note: Friday morning (1/19), I woke up with right arm/deltoid pain (it actually felt like I had received a shot in that arm). The pain seems to be getting worse a week later and it's difficult to lift or move my arm in certain directions. I had covid for the first time right after [place], and even though I am fully vaccinated/boosted, I'm a little worried about the chance of having a blood clot in that arm. It may be far-fetched but I know there's a chance of blood clots after having covid.     Patient Contact    Attempt # 1    Was call answered?  No.  Left message on voicemail with information to call me back.    Upon call back: please triage patient's symptoms.     Karlie Bhatia, RN

## 2024-01-29 ENCOUNTER — MYC MEDICAL ADVICE (OUTPATIENT)
Dept: FAMILY MEDICINE | Facility: CLINIC | Age: 34
End: 2024-01-29

## 2024-04-24 ENCOUNTER — MYC REFILL (OUTPATIENT)
Dept: FAMILY MEDICINE | Facility: CLINIC | Age: 34
End: 2024-04-24
Payer: COMMERCIAL

## 2024-04-24 DIAGNOSIS — F43.22 ADJUSTMENT DISORDER WITH ANXIETY: ICD-10-CM

## 2024-05-03 DIAGNOSIS — D39.11 OVARIAN TUMOR OF BORDERLINE MALIGNANCY, RIGHT: Primary | ICD-10-CM

## 2024-05-03 DIAGNOSIS — R97.1 ELEVATED CA-125: ICD-10-CM

## 2024-05-08 ENCOUNTER — LAB (OUTPATIENT)
Dept: LAB | Facility: CLINIC | Age: 34
End: 2024-05-08
Attending: NURSE PRACTITIONER
Payer: COMMERCIAL

## 2024-05-08 ENCOUNTER — ANCILLARY PROCEDURE (OUTPATIENT)
Dept: ULTRASOUND IMAGING | Facility: CLINIC | Age: 34
End: 2024-05-08
Attending: NURSE PRACTITIONER
Payer: COMMERCIAL

## 2024-05-08 DIAGNOSIS — Z11.4 SCREENING FOR HIV (HUMAN IMMUNODEFICIENCY VIRUS): Primary | ICD-10-CM

## 2024-05-08 DIAGNOSIS — D39.11 OVARIAN TUMOR OF BORDERLINE MALIGNANCY, RIGHT: ICD-10-CM

## 2024-05-08 DIAGNOSIS — R97.1 ELEVATED CA-125: ICD-10-CM

## 2024-05-08 DIAGNOSIS — Z11.59 NEED FOR HEPATITIS C SCREENING TEST: ICD-10-CM

## 2024-05-08 PROCEDURE — 76856 US EXAM PELVIC COMPLETE: CPT | Mod: TC | Performed by: RADIOLOGY

## 2024-05-08 PROCEDURE — 36415 COLL VENOUS BLD VENIPUNCTURE: CPT

## 2024-05-08 PROCEDURE — 86304 IMMUNOASSAY TUMOR CA 125: CPT

## 2024-05-08 PROCEDURE — 76830 TRANSVAGINAL US NON-OB: CPT | Mod: TC | Performed by: RADIOLOGY

## 2024-05-08 PROCEDURE — 86803 HEPATITIS C AB TEST: CPT

## 2024-05-08 PROCEDURE — 87389 HIV-1 AG W/HIV-1&-2 AB AG IA: CPT

## 2024-05-09 LAB
CANCER AG125 SERPL-ACNC: 22 U/ML
HCV AB SERPL QL IA: NONREACTIVE
HIV 1+2 AB+HIV1 P24 AG SERPL QL IA: NONREACTIVE

## 2024-05-10 NOTE — PROGRESS NOTES
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 She completed treatment on 10/2/15 with an exploratory laparotomy, RSO, and fertility sparing staging. She is here today for a surveillance visit.     Pertinent History:  9/29/15: Abdominal pain, 30cm pelvic mass.  73.    10/2/15: Sent to OR for ex lap, RSO, and fertility sparing staging. 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 Elected observation at this time.    16:  13  16:  9, Pap NIL  10/31/16:  10  17:  13  17:  10  17:  10  10/27/17:  10    10/28/17: Pelvic ultrasound:  IMPRESSION:   1. Normal sonographic evaluation of the uterus and left ovary.  2. Trace free fluid in the pelvis which may be physiologic.     18:  9    10/20/18: Pelvic US impression:  Right ovary is surgically absent. Uterus, endometrial  stripe and left ovary are unremarkable. Small amount of free pelvic  fluid is likely physiologic.     10/21/19: Pelvic US:  IMPRESSION:  1. 1.6 cm complex left ovarian cyst.  2. Otherwise unremarkable pelvis post right oophorectomy.     10/30/19:  10, Pap/HPV NILM HPV negative     19: Pelvic US:   IMPRESSION:  1. 1.7 cm simple cyst in the left ovary, likely a dominant follicle.  The complex cyst that was present in the left ovary on the comparison  study dated 10/21/2019 is no longer visualized and was likely a functional hemorrhagic cyst.  2. Unremarkable appearance of the uterus.  3. Prior right oophorectomy.     10/26/2020:  24. Pelvic US:                                          IMPRESSION:  1.  Postoperative changes of prior right oophorectomy.  2.  Small amount of free fluid in the pelvis  is nonspecific, and may  be physiologic.  3.  Small amount of fluid within the endocervical canal is also  nonsignificant, and could be related to the presence of blood  products. Please clinically correlate.      10/29/2021:  10. TVUS: IMPRESSION:  1.  Surgical changes of right oophorectomy without evidence of recurrence.  2.  Endometrial stripe is slightly thickened with prominent vascularity without discrete focal polyp. This is most likely related to stage of menstrual cycle with LMP 10/1/2021 noted by the technologist. Correlate with any abnormal vaginal bleeding or pelvic symptoms. Could consider short-term follow-up pelvic ultrasound after next 2 menstrual cycles to reevaluate.     11/22/2021: Pap NIL.  HPV negative.    5/8/24:  22. Pelvic US  IMPRESSION:  1.  Thickened and mildly heterogeneous endometrium measuring 13 mm.  Consider endometrial sampling.  2.  Right oophorectomy. Simple cyst in the right adnexa measuring 1.0  cm.  3.  A 2.6 cm mildly complex left ovarian cyst is likely a involuting  corpus luteum.                 Today she comes to clinic feeling well overall and is without concern.  She does continue to have right-sided pelvic pain prior to her menstrual cycle worse with BMs.  This has been present for 8 years and has overall not changed.  She has had no abnormal vaginal bleeding or discharge.  She has noticed that over time her menstrual cycle has decreased in length and the last couple cycles have been 19 to 21 days when previously was every 28 days.  She has had some nausea/vomiting prior to her last couple cycles.  Normal bowel/bladder habits.  She does not desire pregnancy and her  has had a vasectomy.                 Health Maintenance  Annual physical: 11/22/23    Past Medical History:    Past Medical History:   Diagnosis Date    Allergic rhinitis due to animal dander     Allergy to mold spores     2/26/14 skin tests pos. for:  dog(+)/DM/M only and only on  intradermals--ie, minimally allergic    Asthma, mild intermittent     last inhaler usage >6 years ago (4/11)    Cancer (H) 9/2015    Stage 1A ovarian cancer    Diagnostic skin and sensitization tests 2/26/14 skin tests pos. 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    treated with RSO    Thyroid disease 10/2020    Hashimoto's disease         Past Surgical History:    Past Surgical History:   Procedure Laterality Date    APPENDECTOMY  10/2/2015    BIOPSY  10/2/2015    COLONOSCOPY N/A 12/2/2015    Procedure: COLONOSCOPY;  Surgeon: Aaron Garrison MD;  Location:  GI    ESOPHAGOSCOPY, GASTROSCOPY, DUODENOSCOPY (EGD), COMBINED N/A 11/23/2015    Procedure: COMBINED ESOPHAGOSCOPY, GASTROSCOPY, DUODENOSCOPY (EGD), BIOPSY SINGLE OR MULTIPLE;  Surgeon: Babs Cassidy MD;  Location:  GI    GYN SURGERY  10/2/2015    LAPAROTOMY EXPLORATORY  10/2/15    Right salpingo-oophorectomy, omentectomy, appendectomy, lymph node biopsies, cancer staging     LAPAROTOMY, STAGING, COMBINED N/A 10/2/2015    Procedure: COMBINED LAPAROTOMY, STAGING;  Surgeon: Duyen Thayer MD;  Location:  OR         Health Maintenance Due   Topic Date Due    IPV IMMUNIZATION (2 of 3 - 4-dose series) 1988    HEPATITIS B IMMUNIZATION (2 of 3 - 3-dose series) 01/26/2004       Current Medications:     Current Outpatient Medications   Medication Sig Dispense Refill    Cholecalciferol (VITAMIN D-3 PO)       FLUoxetine (PROZAC) 20 MG capsule TAKE 1 CAPSULE BY MOUTH DAILY 90 capsule 0    LACTOBACILLUS PO 50 billion      levothyroxine (SYNTHROID/LEVOTHROID) 75 MCG tablet Take 0.5 tablets (37.5 mcg) by mouth daily 45 tablet 1    MULTIPLE VITAMINS PO       pimecrolimus (ELIDEL) 1 % external cream Apply topically 2 times daily To affected area on lower lash line. 30 g 0         Allergies:         Allergies   Allergen Reactions    Clindamycin Phosphate Rash    Keflex [Cephalexin Monohydrate] Rash    Zoloft         Social History:     Social History     Tobacco Use    Smoking status: Never    Smokeless tobacco: Never    Tobacco comments:     never smoked   Substance Use Topics    Alcohol use: Yes     Comment: Socially       History   Drug Use Unknown         Family History:     The patient's family history is notable for:    Family History   Problem Relation Age of Onset    Diabetes Mother         Gestational diabates when pregnant with me    Thyroid Disease Mother     Blood Disease Mother         anemia    Cancer - colorectal Mother 54    Colon Cancer Mother         Doing well    Cancer Maternal Grandmother     Blood Disease Maternal Grandmother         cervical    Respiratory Maternal Grandmother         emphysema    Diabetes Maternal Grandfather             Cerebrovascular Disease Maternal Grandfather     Cancer Maternal Grandfather         kidney    Breast Cancer Paternal Grandmother             Diabetes Paternal Grandfather             Cerebrovascular Disease Paternal Grandfather     Skin Cancer Paternal Grandfather     Heart Disease Father 56        had angioplasty    Coronary Artery Disease Father         Had angioplasty in 2014, and two additional angioplasties in     Substance Abuse Father         Has abused alcohol since before I was born    Mental Illness Sister         Borderline personality disorder         Physical Exam:     /77   Pulse 83   Temp 98  F (36.7  C)   Resp 16   Wt 54.9 kg (121 lb)   SpO2 99%   BMI 21.27 kg/m    Body mass index is 21.27 kg/m .    General Appearance:  alert, no distress     HEENT: no palpable nodules or masses        Cardiovascular: regular rate and rhythm, no gallops, rubs or murmurs     Respiratory: lungs clear, no rales, rhonchi or wheezes    Musculoskeletal: extremities non tender and without edema    Skin: no  lesions or rashes     Neurological: normal gait, no gross defects     Psychiatric: appropriate mood and affect                               Hematological: normal cervical, supraclavicular and inguinal lymph nodes     Gastrointestinal:       abdomen soft, non-tender, non-distended, no organomegaly or masses    Genitourinary: External genitalia and urethral meatus appears normal.  Vagina is smooth without nodularity or masses.  Cervix normal and without lesions.  Bimanual exam reveal no masses, nodularity or fullness.  Recto-vaginal exam confirms these findings.      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 She completed treatment on 10/2/15 with an exploratory laparotomy, RSO, and fertility sparing staging. She is here today for a surveillance visit.      20 minutes spent on the date of the encounter doing chart review, history and exam, documentation, and further activities as noted above.      Plan:     1.) Borderline ovarian tumor:  SOFIA on exam.  Pelvic US reviewed with Dr. Stratton and not concerning.   WDL.  Per Dr. Stratton he does not recommend continuing pelvic US for surveillance, would consider pelvic US based on symtpoms.  RTC in 1 year for next surveillance visit and  level.  Discussed that these can be here or with her PCP/benign gynecology.  She would like to follow here for her next visit but would consider transitioning to care with her PCP after that.  Discussed that she may consider birth control to help with premenstrual symptoms.  Reviewed signs and symptoms for when she should contact the clinic or seek additional care.  Patient to contact the clinic with any questions or concerns in the interim.      Genetic risk factors were assessed and she does not meet qualification for referral.    Labs and/or tests ordered include:  .       2.) Health maintenance:  Issues addressed today include following up  with PCP for annual health maintenance and non-gynecologic issues.       Ruby Diaz, DNP, APRN, FNP-C, AOCNP  Oncology Nurse Practitioner  Division of Gynecologic Oncology  Pager: [phone]     CC  Patient Care Team:  Ariadne Jimenez MD as PCP - General (Family Medicine)  Myra Gifford APRN CNP as Nurse Practitioner (Gynecologic Oncology)  Joe Stratton MD as MD (Oncology)  Alexx Still DO as MD (Neurology)  Ariadne Jimenez MD as Assigned PCP  Romina Strauss PA-C as Physician Assistant (Dermatology)  Amish Shah MD as MD (Endocrinology, Diabetes, and Metabolism)  Mustapha Gamez MD as MD (Internal Medicine)  Nikunj Radford MD as MD (Neurology)  Amish Shah MD as Assigned Endocrinology Provider  Nuria Baldwin NP as Nurse Practitioner (Nurse Practitioner)  Yady Tom MD as Assigned Allergy Provider  SELF, REFERRED

## 2024-05-13 ENCOUNTER — ONCOLOGY VISIT (OUTPATIENT)
Dept: ONCOLOGY | Facility: CLINIC | Age: 34
End: 2024-05-13
Attending: NURSE PRACTITIONER
Payer: COMMERCIAL

## 2024-05-13 VITALS
HEART RATE: 83 BPM | RESPIRATION RATE: 16 BRPM | OXYGEN SATURATION: 99 % | BODY MASS INDEX: 21.27 KG/M2 | DIASTOLIC BLOOD PRESSURE: 77 MMHG | WEIGHT: 121 LBS | TEMPERATURE: 98 F | SYSTOLIC BLOOD PRESSURE: 119 MMHG

## 2024-05-13 DIAGNOSIS — D39.11 OVARIAN TUMOR OF BORDERLINE MALIGNANCY, RIGHT: Primary | ICD-10-CM

## 2024-05-13 DIAGNOSIS — R97.1 ELEVATED CA-125: ICD-10-CM

## 2024-05-13 PROCEDURE — 99213 OFFICE O/P EST LOW 20 MIN: CPT | Performed by: NURSE PRACTITIONER

## 2024-05-13 ASSESSMENT — PAIN SCALES - GENERAL: PAINLEVEL: NO PAIN (0)

## 2024-05-13 NOTE — LETTER
2024         RE: Kelley Serra   [facility] [id number]        Dear Colleague,    Thank you for referring your patient, Kelley Serra, to the [facility]. Please see a copy of my visit note below.    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 She completed treatment on 10/2/15 with an exploratory laparotomy, RSO, and fertility sparing staging. She is here today for a surveillance visit.     Pertinent History:  9/29/15: Abdominal pain, 30cm pelvic mass.  71.    10/2/15: Sent to OR for ex lap, RSO, and fertility sparing staging. 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 Elected observation at this time.    16:  13  16:  9, Pap NIL  10/31/16:  10  17:  13  17:  10  17:  10  10/27/17:  10    10/28/17: Pelvic ultrasound:  IMPRESSION:   1. Normal sonographic evaluation of the uterus and left ovary.  2. Trace free fluid in the pelvis which may be physiologic.     18:  9    10/20/18: Pelvic US impression:  Right ovary is surgically absent. Uterus, endometrial  stripe and left ovary are unremarkable. Small amount of free pelvic  fluid is likely physiologic.     10/21/19: Pelvic US:  IMPRESSION:  1. 1.6 cm complex left ovarian cyst.  2. Otherwise unremarkable pelvis post right oophorectomy.     10/30/19:  10, Pap/HPV NILM HPV negative     19: Pelvic US:   IMPRESSION:  1. 1.7 cm simple cyst in the left ovary, likely a dominant follicle.  The complex cyst that was present in the left ovary on the comparison  study dated 10/21/2019 is no longer visualized and was likely a functional hemorrhagic cyst.  2.  Unremarkable appearance of the uterus.  3. Prior right oophorectomy.     10/26/2020:  24. Pelvic US:                                          IMPRESSION:  1.  Postoperative changes of prior right oophorectomy.  2.  Small amount of free fluid in the pelvis is nonspecific, and may  be physiologic.  3.  Small amount of fluid within the endocervical canal is also  nonsignificant, and could be related to the presence of blood  products. Please clinically correlate.      10/29/2021:  10. TVUS: IMPRESSION:  1.  Surgical changes of right oophorectomy without evidence of recurrence.  2.  Endometrial stripe is slightly thickened with prominent vascularity without discrete focal polyp. This is most likely related to stage of menstrual cycle with LMP 10/1/2021 noted by the technologist. Correlate with any abnormal vaginal bleeding or pelvic symptoms. Could consider short-term follow-up pelvic ultrasound after next 2 menstrual cycles to reevaluate.     11/22/2021: Pap NIL.  HPV negative.    5/8/24:  22. Pelvic US  IMPRESSION:  1.  Thickened and mildly heterogeneous endometrium measuring 13 mm.  Consider endometrial sampling.  2.  Right oophorectomy. Simple cyst in the right adnexa measuring 1.0  cm.  3.  A 2.6 cm mildly complex left ovarian cyst is likely a involuting  corpus luteum.                 Today she comes to clinic feeling well overall and is without concern.  She does continue to have right-sided pelvic pain prior to her menstrual cycle worse with BMs.  This has been present for 8 years and has overall not changed.  She has had no abnormal vaginal bleeding or discharge.  She has noticed that over time her menstrual cycle has decreased in length and the last couple cycles have been 19 to 21 days when previously was every 28 days.  She has had some nausea/vomiting prior to her last couple cycles.  Normal bowel/bladder habits.  She does not desire pregnancy and her  has had a vasectomy.        Health Maintenance  Annual physical: 11/22/23    Past Medical History:    Past Medical History:   Diagnosis Date    Allergic rhinitis due to animal dander     Allergy to mold spores     2/26/14 skin tests pos. for:  dog(+)/DM/M only and only on intradermals--ie, minimally allergic    Asthma, mild intermittent     last inhaler usage >6 years ago (4/11)    Cancer (H) 9/2015    Stage 1A ovarian cancer    Diagnostic skin and sensitization tests 2/26/14 skin tests pos. 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    treated with RSO    Thyroid disease 10/2020    Hashimoto's disease         Past Surgical History:    Past Surgical History:   Procedure Laterality Date    APPENDECTOMY  10/2/2015    BIOPSY  10/2/2015    COLONOSCOPY N/A 12/2/2015    Procedure: COLONOSCOPY;  Surgeon: Aaron Garrison MD;  Location:  GI    ESOPHAGOSCOPY, GASTROSCOPY, DUODENOSCOPY (EGD), COMBINED N/A 11/23/2015    Procedure: COMBINED ESOPHAGOSCOPY, GASTROSCOPY, DUODENOSCOPY (EGD), BIOPSY SINGLE OR MULTIPLE;  Surgeon: Babs Cassidy MD;  Location:  GI    GYN SURGERY  10/2/2015    LAPAROTOMY EXPLORATORY  10/2/15    Right salpingo-oophorectomy, omentectomy, appendectomy, lymph node biopsies, cancer staging     LAPAROTOMY, STAGING, COMBINED N/A 10/2/2015    Procedure: COMBINED LAPAROTOMY, STAGING;  Surgeon: Duyen Thayer MD;  Location:  OR         Health Maintenance Due   Topic Date Due    IPV IMMUNIZATION (2 of 3 - 4-dose series) 1988    HEPATITIS B IMMUNIZATION (2 of 3 - 3-dose series) 01/26/2004       Current Medications:     Current Outpatient Medications   Medication Sig Dispense Refill    Cholecalciferol (VITAMIN D-3 PO)       FLUoxetine (PROZAC) 20 MG capsule TAKE 1 CAPSULE BY MOUTH DAILY 90 capsule 0    LACTOBACILLUS PO 50 billion      levothyroxine  (SYNTHROID/LEVOTHROID) 75 MCG tablet Take 0.5 tablets (37.5 mcg) by mouth daily 45 tablet 1    MULTIPLE VITAMINS PO       pimecrolimus (ELIDEL) 1 % external cream Apply topically 2 times daily To affected area on lower lash line. 30 g 0         Allergies:        Allergies   Allergen Reactions    Clindamycin Phosphate Rash    Keflex [Cephalexin Monohydrate] Rash    Zoloft         Social History:     Social History     Tobacco Use    Smoking status: Never    Smokeless tobacco: Never    Tobacco comments:     never smoked   Substance Use Topics    Alcohol use: Yes     Comment: Socially       History   Drug Use Unknown         Family History:     The patient's family history is notable for:    Family History   Problem Relation Age of Onset    Diabetes Mother         Gestational diabates when pregnant with me    Thyroid Disease Mother     Blood Disease Mother         anemia    Cancer - colorectal Mother 54    Colon Cancer Mother         Doing well    Cancer Maternal Grandmother     Blood Disease Maternal Grandmother         cervical    Respiratory Maternal Grandmother         emphysema    Diabetes Maternal Grandfather             Cerebrovascular Disease Maternal Grandfather     Cancer Maternal Grandfather         kidney    Breast Cancer Paternal Grandmother             Diabetes Paternal Grandfather             Cerebrovascular Disease Paternal Grandfather     Skin Cancer Paternal Grandfather     Heart Disease Father 56        had angioplasty    Coronary Artery Disease Father         Had angioplasty in 2014, and two additional angioplasties in     Substance Abuse Father         Has abused alcohol since before I was born    Mental Illness Sister         Borderline personality disorder         Physical Exam:     /77   Pulse 83   Temp 98  F (36.7  C)   Resp 16   Wt 54.9 kg (121 lb)   SpO2 99%   BMI 21.27 kg/m    Body mass index is 21.27 kg/m .    General Appearance:  alert, no  distress     HEENT: no palpable nodules or masses        Cardiovascular: regular rate and rhythm, no gallops, rubs or murmurs     Respiratory: lungs clear, no rales, rhonchi or wheezes    Musculoskeletal: extremities non tender and without edema    Skin: no lesions or rashes     Neurological: normal gait, no gross defects     Psychiatric: appropriate mood and affect                               Hematological: normal cervical, supraclavicular and inguinal lymph nodes     Gastrointestinal:       abdomen soft, non-tender, non-distended, no organomegaly or masses    Genitourinary: External genitalia and urethral meatus appears normal.  Vagina is smooth without nodularity or masses.  Cervix normal and without lesions.  Bimanual exam reveal no masses, nodularity or fullness.  Recto-vaginal exam confirms these findings.      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 She completed treatment on 10/2/15 with an exploratory laparotomy, RSO, and fertility sparing staging. She is here today for a surveillance visit.      20 minutes spent on the date of the encounter doing chart review, history and exam, documentation, and further activities as noted above.      Plan:     1.) Borderline ovarian tumor:  SOFIA on exam.  Pelvic US reviewed with Dr. Stratton and not concerning.   WDL.  Per Dr. Stratton he does not recommend continuing pelvic US for surveillance, would consider pelvic US based on symtpoms.  RTC in 1 year for next surveillance visit and  level.  Discussed that these can be here or with her PCP/benign gynecology.  She would like to follow here for her next visit but would consider transitioning to care with her PCP after that.  Discussed that she may consider birth control to help with premenstrual symptoms.  Reviewed signs and symptoms for when she should contact the clinic or seek additional care.  Patient to contact the clinic  with any questions or concerns in the interim.      Genetic risk factors were assessed and she does not meet qualification for referral.    Labs and/or tests ordered include:  .       2.) Health maintenance:  Issues addressed today include following up with PCP for annual health maintenance and non-gynecologic issues.       Ruby Diaz, DNP, APRN, FNP-C, AOCNP  Oncology Nurse Practitioner  Division of Gynecologic Oncology  Pager: [phone]     CC  Patient Care Team:  Ariadne Jimenez MD as PCP - General (Family Medicine)

## 2024-07-22 DIAGNOSIS — F43.22 ADJUSTMENT DISORDER WITH ANXIETY: ICD-10-CM

## 2024-08-07 ENCOUNTER — TRANSFERRED RECORDS (OUTPATIENT)
Dept: HEALTH INFORMATION MANAGEMENT | Facility: CLINIC | Age: 34
End: 2024-08-07
Payer: COMMERCIAL

## 2024-10-01 ENCOUNTER — MYC MEDICAL ADVICE (OUTPATIENT)
Dept: FAMILY MEDICINE | Facility: CLINIC | Age: 34
End: 2024-10-01
Payer: COMMERCIAL

## 2024-10-01 DIAGNOSIS — Z86.2 HISTORY OF ANEMIA: Primary | ICD-10-CM

## 2024-10-01 NOTE — TELEPHONE ENCOUNTER
LS,  Please see below [employer] message and advise.  Orders pended.  Patient is scheduled for physical.  Last CBC and CMP in 2023- normal.  Thanks,  Kylie JEFFREY RN

## 2024-10-02 ENCOUNTER — LAB (OUTPATIENT)
Dept: LAB | Facility: CLINIC | Age: 34
End: 2024-10-02
Payer: COMMERCIAL

## 2024-10-02 DIAGNOSIS — Z86.2 HISTORY OF ANEMIA: ICD-10-CM

## 2024-10-02 PROCEDURE — 82728 ASSAY OF FERRITIN: CPT

## 2024-10-02 PROCEDURE — 36415 COLL VENOUS BLD VENIPUNCTURE: CPT

## 2024-10-02 PROCEDURE — 84466 ASSAY OF TRANSFERRIN: CPT

## 2024-10-02 PROCEDURE — 82306 VITAMIN D 25 HYDROXY: CPT

## 2024-10-03 LAB
FERRITIN SERPL-MCNC: 55 NG/ML (ref 6–175)
TRANSFERRIN SERPL-MCNC: 233 MG/DL (ref 200–360)
VIT D+METAB SERPL-MCNC: 32 NG/ML (ref 20–50)

## 2024-10-17 DIAGNOSIS — F43.22 ADJUSTMENT DISORDER WITH ANXIETY: ICD-10-CM

## 2025-01-17 PROBLEM — Z86.2 HX OF IRON DEFICIENCY ANEMIA: Status: ACTIVE | Noted: 2025-01-17

## 2025-01-17 PROBLEM — Z83.3 FAMILY HISTORY OF DIABETES MELLITUS: Status: ACTIVE | Noted: 2025-01-17

## 2025-03-19 DIAGNOSIS — F43.22 ADJUSTMENT DISORDER WITH ANXIETY: ICD-10-CM

## 2025-03-20 RX ORDER — FLUOXETINE HYDROCHLORIDE 40 MG/1
40 CAPSULE ORAL DAILY
Qty: 30 CAPSULE | Refills: 3 | Status: SHIPPED | OUTPATIENT
Start: 2025-03-20

## 2025-04-01 ENCOUNTER — LAB REQUISITION (OUTPATIENT)
Dept: LAB | Facility: CLINIC | Age: 35
End: 2025-04-01

## 2025-04-01 DIAGNOSIS — R61 GENERALIZED HYPERHIDROSIS: ICD-10-CM

## 2025-04-01 DIAGNOSIS — R53.83 OTHER FATIGUE: ICD-10-CM

## 2025-04-01 DIAGNOSIS — N92.0 EXCESSIVE AND FREQUENT MENSTRUATION WITH REGULAR CYCLE: ICD-10-CM

## 2025-04-01 LAB
BASOPHILS # BLD AUTO: 0.1 10E3/UL (ref 0–0.2)
BASOPHILS NFR BLD AUTO: 1 %
EOSINOPHIL # BLD AUTO: 0.3 10E3/UL (ref 0–0.7)
EOSINOPHIL NFR BLD AUTO: 4 %
ERYTHROCYTE [DISTWIDTH] IN BLOOD BY AUTOMATED COUNT: 12.6 % (ref 10–15)
HCT VFR BLD AUTO: 41.3 % (ref 35–47)
HGB BLD-MCNC: 13.2 G/DL (ref 11.7–15.7)
IMM GRANULOCYTES # BLD: 0 10E3/UL
IMM GRANULOCYTES NFR BLD: 0 %
LYMPHOCYTES # BLD AUTO: 1.5 10E3/UL (ref 0.8–5.3)
LYMPHOCYTES NFR BLD AUTO: 23 %
MCH RBC QN AUTO: 28.6 PG (ref 26.5–33)
MCHC RBC AUTO-ENTMCNC: 32 G/DL (ref 31.5–36.5)
MCV RBC AUTO: 90 FL (ref 78–100)
MONOCYTES # BLD AUTO: 0.5 10E3/UL (ref 0–1.3)
MONOCYTES NFR BLD AUTO: 7 %
NEUTROPHILS # BLD AUTO: 4.1 10E3/UL (ref 1.6–8.3)
NEUTROPHILS NFR BLD AUTO: 64 %
NRBC # BLD AUTO: 0 10E3/UL
NRBC BLD AUTO-RTO: 0 /100
PLATELET # BLD AUTO: 280 10E3/UL (ref 150–450)
RBC # BLD AUTO: 4.61 10E6/UL (ref 3.8–5.2)
WBC # BLD AUTO: 6.5 10E3/UL (ref 4–11)

## 2025-04-01 PROCEDURE — 82670 ASSAY OF TOTAL ESTRADIOL: CPT | Performed by: STUDENT IN AN ORGANIZED HEALTH CARE EDUCATION/TRAINING PROGRAM

## 2025-04-01 PROCEDURE — 84144 ASSAY OF PROGESTERONE: CPT | Performed by: STUDENT IN AN ORGANIZED HEALTH CARE EDUCATION/TRAINING PROGRAM

## 2025-04-01 PROCEDURE — 82310 ASSAY OF CALCIUM: CPT | Performed by: STUDENT IN AN ORGANIZED HEALTH CARE EDUCATION/TRAINING PROGRAM

## 2025-04-01 PROCEDURE — 84155 ASSAY OF PROTEIN SERUM: CPT | Performed by: STUDENT IN AN ORGANIZED HEALTH CARE EDUCATION/TRAINING PROGRAM

## 2025-04-01 PROCEDURE — 82728 ASSAY OF FERRITIN: CPT | Performed by: STUDENT IN AN ORGANIZED HEALTH CARE EDUCATION/TRAINING PROGRAM

## 2025-04-01 PROCEDURE — 83550 IRON BINDING TEST: CPT | Performed by: STUDENT IN AN ORGANIZED HEALTH CARE EDUCATION/TRAINING PROGRAM

## 2025-04-01 PROCEDURE — 83002 ASSAY OF GONADOTROPIN (LH): CPT | Performed by: STUDENT IN AN ORGANIZED HEALTH CARE EDUCATION/TRAINING PROGRAM

## 2025-04-01 PROCEDURE — 85004 AUTOMATED DIFF WBC COUNT: CPT | Performed by: STUDENT IN AN ORGANIZED HEALTH CARE EDUCATION/TRAINING PROGRAM

## 2025-04-01 PROCEDURE — 83001 ASSAY OF GONADOTROPIN (FSH): CPT | Performed by: STUDENT IN AN ORGANIZED HEALTH CARE EDUCATION/TRAINING PROGRAM

## 2025-04-01 PROCEDURE — 84443 ASSAY THYROID STIM HORMONE: CPT | Performed by: STUDENT IN AN ORGANIZED HEALTH CARE EDUCATION/TRAINING PROGRAM

## 2025-04-01 PROCEDURE — 84702 CHORIONIC GONADOTROPIN TEST: CPT | Performed by: STUDENT IN AN ORGANIZED HEALTH CARE EDUCATION/TRAINING PROGRAM

## 2025-04-01 PROCEDURE — 83540 ASSAY OF IRON: CPT | Performed by: STUDENT IN AN ORGANIZED HEALTH CARE EDUCATION/TRAINING PROGRAM

## 2025-04-02 LAB
ALBUMIN SERPL BCG-MCNC: 4.7 G/DL (ref 3.5–5.2)
ALP SERPL-CCNC: 43 U/L (ref 40–150)
ALT SERPL W P-5'-P-CCNC: 8 U/L (ref 0–50)
ANION GAP SERPL CALCULATED.3IONS-SCNC: 10 MMOL/L (ref 7–15)
AST SERPL W P-5'-P-CCNC: 18 U/L (ref 0–45)
BILIRUB SERPL-MCNC: 0.4 MG/DL
BUN SERPL-MCNC: 9.8 MG/DL (ref 6–20)
CALCIUM SERPL-MCNC: 9.3 MG/DL (ref 8.8–10.4)
CHLORIDE SERPL-SCNC: 105 MMOL/L (ref 98–107)
CREAT SERPL-MCNC: 0.67 MG/DL (ref 0.51–0.95)
EGFRCR SERPLBLD CKD-EPI 2021: >90 ML/MIN/1.73M2
ESTRADIOL SERPL-MCNC: 108 PG/ML
FERRITIN SERPL-MCNC: 73 NG/ML (ref 6–175)
FSH SERPL IRP2-ACNC: 3.8 MIU/ML
GLUCOSE SERPL-MCNC: 87 MG/DL (ref 70–99)
HCG INTACT+B SERPL-ACNC: <1 MIU/ML
HCO3 SERPL-SCNC: 26 MMOL/L (ref 22–29)
IRON BINDING CAPACITY (ROCHE): 257 UG/DL (ref 240–430)
IRON SATN MFR SERPL: 29 % (ref 15–46)
IRON SERPL-MCNC: 74 UG/DL (ref 37–145)
LH SERPL-ACNC: 5.1 MIU/ML
POTASSIUM SERPL-SCNC: 4.6 MMOL/L (ref 3.4–5.3)
PROGEST SERPL-MCNC: 0.2 NG/ML
PROT SERPL-MCNC: 7.2 G/DL (ref 6.4–8.3)
SODIUM SERPL-SCNC: 141 MMOL/L (ref 135–145)
TSH SERPL DL<=0.005 MIU/L-ACNC: 1.69 UIU/ML (ref 0.3–4.2)

## 2025-06-16 ENCOUNTER — MYC REFILL (OUTPATIENT)
Dept: FAMILY MEDICINE | Facility: CLINIC | Age: 35
End: 2025-06-16
Payer: COMMERCIAL

## 2025-06-16 DIAGNOSIS — F43.22 ADJUSTMENT DISORDER WITH ANXIETY: ICD-10-CM

## 2025-09-02 ENCOUNTER — LAB REQUISITION (OUTPATIENT)
Dept: LAB | Facility: CLINIC | Age: 35
End: 2025-09-02

## 2025-09-02 DIAGNOSIS — N92.0 EXCESSIVE AND FREQUENT MENSTRUATION WITH REGULAR CYCLE: ICD-10-CM

## 2025-09-02 DIAGNOSIS — Z13.220 ENCOUNTER FOR SCREENING FOR LIPOID DISORDERS: ICD-10-CM

## 2025-09-02 DIAGNOSIS — Z13.1 ENCOUNTER FOR SCREENING FOR DIABETES MELLITUS: ICD-10-CM

## 2025-09-02 DIAGNOSIS — Z13.228 ENCOUNTER FOR SCREENING FOR OTHER METABOLIC DISORDERS: ICD-10-CM

## 2025-09-02 LAB
BASOPHILS # BLD AUTO: 0.07 10E3/UL (ref 0–0.2)
BASOPHILS NFR BLD AUTO: 1.3 %
CHOLEST SERPL-MCNC: 164 MG/DL
EOSINOPHIL # BLD AUTO: 0.28 10E3/UL (ref 0–0.7)
EOSINOPHIL NFR BLD AUTO: 5.2 %
ERYTHROCYTE [DISTWIDTH] IN BLOOD BY AUTOMATED COUNT: 13.2 % (ref 10–15)
EST. AVERAGE GLUCOSE BLD GHB EST-MCNC: 111 MG/DL
FASTING STATUS PATIENT QL REPORTED: NORMAL
HBA1C MFR BLD: 5.5 %
HCT VFR BLD AUTO: 40.4 % (ref 35–47)
HDLC SERPL-MCNC: 61 MG/DL
HGB BLD-MCNC: 13.4 G/DL (ref 11.7–15.7)
IMM GRANULOCYTES # BLD: <0.03 10E3/UL
IMM GRANULOCYTES NFR BLD: 0.2 %
LDLC SERPL CALC-MCNC: 88 MG/DL
LYMPHOCYTES # BLD AUTO: 1.6 10E3/UL (ref 0.8–5.3)
LYMPHOCYTES NFR BLD AUTO: 29.7 %
MCH RBC QN AUTO: 29 PG (ref 26.5–33)
MCHC RBC AUTO-ENTMCNC: 33.2 G/DL (ref 31.5–36.5)
MCV RBC AUTO: 87.4 FL (ref 78–100)
MONOCYTES # BLD AUTO: 0.52 10E3/UL (ref 0–1.3)
MONOCYTES NFR BLD AUTO: 9.7 %
NEUTROPHILS # BLD AUTO: 2.9 10E3/UL (ref 1.6–8.3)
NEUTROPHILS NFR BLD AUTO: 53.9 %
NONHDLC SERPL-MCNC: 103 MG/DL
NRBC # BLD AUTO: <0.03 10E3/UL
NRBC BLD AUTO-RTO: 0 /100
PLATELET # BLD AUTO: 230 10E3/UL (ref 150–450)
PROLACTIN SERPL 3RD IS-MCNC: 9 NG/ML (ref 5–23)
RBC # BLD AUTO: 4.62 10E6/UL (ref 3.8–5.2)
TRIGL SERPL-MCNC: 75 MG/DL
TSH SERPL DL<=0.005 MIU/L-ACNC: 3.06 UIU/ML (ref 0.3–4.2)
WBC # BLD AUTO: 5.38 10E3/UL (ref 4–11)

## 2025-09-02 PROCEDURE — 84443 ASSAY THYROID STIM HORMONE: CPT | Performed by: STUDENT IN AN ORGANIZED HEALTH CARE EDUCATION/TRAINING PROGRAM

## 2025-09-02 PROCEDURE — 80061 LIPID PANEL: CPT | Performed by: STUDENT IN AN ORGANIZED HEALTH CARE EDUCATION/TRAINING PROGRAM

## 2025-09-02 PROCEDURE — 85004 AUTOMATED DIFF WBC COUNT: CPT | Performed by: STUDENT IN AN ORGANIZED HEALTH CARE EDUCATION/TRAINING PROGRAM

## 2025-09-02 PROCEDURE — 84146 ASSAY OF PROLACTIN: CPT | Performed by: STUDENT IN AN ORGANIZED HEALTH CARE EDUCATION/TRAINING PROGRAM

## 2025-09-02 PROCEDURE — 83036 HEMOGLOBIN GLYCOSYLATED A1C: CPT | Performed by: STUDENT IN AN ORGANIZED HEALTH CARE EDUCATION/TRAINING PROGRAM
